# Patient Record
Sex: FEMALE | Race: WHITE | NOT HISPANIC OR LATINO | Employment: OTHER | URBAN - METROPOLITAN AREA
[De-identification: names, ages, dates, MRNs, and addresses within clinical notes are randomized per-mention and may not be internally consistent; named-entity substitution may affect disease eponyms.]

---

## 2017-01-01 ENCOUNTER — APPOINTMENT (OUTPATIENT)
Dept: ONCOLOGY | Facility: MEDICAL CENTER | Age: 77
End: 2017-01-01
Attending: SPECIALIST
Payer: MEDICARE

## 2017-01-01 ENCOUNTER — PATIENT OUTREACH (OUTPATIENT)
Dept: OTHER | Facility: MEDICAL CENTER | Age: 77
End: 2017-01-01

## 2017-01-01 ENCOUNTER — HOSPITAL ENCOUNTER (OUTPATIENT)
Dept: RADIOLOGY | Facility: MEDICAL CENTER | Age: 77
End: 2017-12-15
Attending: SPECIALIST
Payer: MEDICARE

## 2017-01-01 VITALS
WEIGHT: 150.79 LBS | SYSTOLIC BLOOD PRESSURE: 143 MMHG | RESPIRATION RATE: 18 BRPM | HEIGHT: 64 IN | BODY MASS INDEX: 25.74 KG/M2 | DIASTOLIC BLOOD PRESSURE: 54 MMHG | TEMPERATURE: 97.1 F | OXYGEN SATURATION: 100 % | HEART RATE: 71 BPM

## 2017-01-01 VITALS
HEART RATE: 78 BPM | OXYGEN SATURATION: 99 % | RESPIRATION RATE: 18 BRPM | SYSTOLIC BLOOD PRESSURE: 137 MMHG | WEIGHT: 150.13 LBS | HEIGHT: 64 IN | BODY MASS INDEX: 25.63 KG/M2 | DIASTOLIC BLOOD PRESSURE: 67 MMHG | TEMPERATURE: 97.8 F

## 2017-01-01 VITALS
SYSTOLIC BLOOD PRESSURE: 144 MMHG | RESPIRATION RATE: 18 BRPM | DIASTOLIC BLOOD PRESSURE: 57 MMHG | HEART RATE: 71 BPM | TEMPERATURE: 97.6 F | WEIGHT: 157.63 LBS | OXYGEN SATURATION: 97 % | HEIGHT: 64 IN | BODY MASS INDEX: 26.91 KG/M2

## 2017-01-01 VITALS
SYSTOLIC BLOOD PRESSURE: 165 MMHG | TEMPERATURE: 98.6 F | BODY MASS INDEX: 27.7 KG/M2 | OXYGEN SATURATION: 93 % | HEART RATE: 90 BPM | DIASTOLIC BLOOD PRESSURE: 63 MMHG | HEIGHT: 64 IN | RESPIRATION RATE: 18 BRPM | WEIGHT: 162.26 LBS

## 2017-01-01 VITALS
OXYGEN SATURATION: 99 % | HEART RATE: 83 BPM | WEIGHT: 153 LBS | RESPIRATION RATE: 18 BRPM | SYSTOLIC BLOOD PRESSURE: 139 MMHG | HEIGHT: 64 IN | DIASTOLIC BLOOD PRESSURE: 58 MMHG | BODY MASS INDEX: 26.12 KG/M2 | TEMPERATURE: 97.7 F

## 2017-01-01 VITALS
HEART RATE: 59 BPM | DIASTOLIC BLOOD PRESSURE: 63 MMHG | OXYGEN SATURATION: 99 % | HEIGHT: 64 IN | TEMPERATURE: 97.8 F | BODY MASS INDEX: 25.37 KG/M2 | RESPIRATION RATE: 18 BRPM | SYSTOLIC BLOOD PRESSURE: 113 MMHG | WEIGHT: 148.59 LBS

## 2017-01-01 VITALS
BODY MASS INDEX: 26.8 KG/M2 | HEIGHT: 64 IN | TEMPERATURE: 97.8 F | RESPIRATION RATE: 18 BRPM | HEART RATE: 65 BPM | OXYGEN SATURATION: 97 % | SYSTOLIC BLOOD PRESSURE: 160 MMHG | WEIGHT: 156.97 LBS | DIASTOLIC BLOOD PRESSURE: 59 MMHG

## 2017-01-01 VITALS
RESPIRATION RATE: 18 BRPM | OXYGEN SATURATION: 98 % | SYSTOLIC BLOOD PRESSURE: 167 MMHG | WEIGHT: 151.9 LBS | HEART RATE: 77 BPM | HEIGHT: 64 IN | DIASTOLIC BLOOD PRESSURE: 68 MMHG | BODY MASS INDEX: 25.93 KG/M2 | TEMPERATURE: 97.7 F

## 2017-01-01 DIAGNOSIS — C56.9 MALIGNANT NEOPLASM OF OVARY, UNSPECIFIED LATERALITY (HCC): ICD-10-CM

## 2017-01-01 DIAGNOSIS — E86.0 DEHYDRATION: ICD-10-CM

## 2017-01-01 DIAGNOSIS — T45.1X5A ANTINEOPLASTIC CHEMOTHERAPY INDUCED ANEMIA: ICD-10-CM

## 2017-01-01 DIAGNOSIS — D64.81 ANTINEOPLASTIC CHEMOTHERAPY INDUCED ANEMIA: ICD-10-CM

## 2017-01-01 DIAGNOSIS — N17.9 ACUTE KIDNEY INJURY (HCC): ICD-10-CM

## 2017-01-01 DIAGNOSIS — R53.0 NEOPLASTIC MALIGNANT RELATED FATIGUE: ICD-10-CM

## 2017-01-01 LAB
ALBUMIN SERPL BCP-MCNC: 2.8 G/DL (ref 3.2–4.9)
ALBUMIN SERPL BCP-MCNC: 3.4 G/DL (ref 3.2–4.9)
ALBUMIN SERPL BCP-MCNC: 3.5 G/DL (ref 3.2–4.9)
ALBUMIN/GLOB SERPL: 0.7 G/DL
ALBUMIN/GLOB SERPL: 1.2 G/DL
ALBUMIN/GLOB SERPL: 1.4 G/DL
ALP SERPL-CCNC: 77 U/L (ref 30–99)
ALP SERPL-CCNC: 80 U/L (ref 30–99)
ALP SERPL-CCNC: 83 U/L (ref 30–99)
ALT SERPL-CCNC: 15 U/L (ref 2–50)
ALT SERPL-CCNC: 20 U/L (ref 2–50)
ALT SERPL-CCNC: 21 U/L (ref 2–50)
AMORPH CRY #/AREA URNS HPF: PRESENT /HPF
ANION GAP SERPL CALC-SCNC: 7 MMOL/L (ref 0–11.9)
ANION GAP SERPL CALC-SCNC: 8 MMOL/L (ref 0–11.9)
ANION GAP SERPL CALC-SCNC: 8 MMOL/L (ref 0–11.9)
ANION GAP SERPL CALC-SCNC: 9 MMOL/L (ref 0–11.9)
ANISOCYTOSIS BLD QL SMEAR: ABNORMAL
APPEARANCE UR: ABNORMAL
AST SERPL-CCNC: 33 U/L (ref 12–45)
AST SERPL-CCNC: 36 U/L (ref 12–45)
AST SERPL-CCNC: 39 U/L (ref 12–45)
BACTERIA #/AREA URNS HPF: ABNORMAL /HPF
BACTERIA #/AREA URNS HPF: ABNORMAL /HPF
BACTERIA #/AREA URNS HPF: NEGATIVE /HPF
BACTERIA UR CULT: ABNORMAL
BACTERIA UR CULT: ABNORMAL
BASOPHILS # BLD AUTO: 0.6 % (ref 0–1.8)
BASOPHILS # BLD AUTO: 0.8 % (ref 0–1.8)
BASOPHILS # BLD AUTO: 0.9 % (ref 0–1.8)
BASOPHILS # BLD AUTO: 1.7 % (ref 0–1.8)
BASOPHILS # BLD AUTO: 2.8 % (ref 0–1.8)
BASOPHILS # BLD: 0.02 K/UL (ref 0–0.12)
BASOPHILS # BLD: 0.03 K/UL (ref 0–0.12)
BASOPHILS # BLD: 0.03 K/UL (ref 0–0.12)
BASOPHILS # BLD: 0.04 K/UL (ref 0–0.12)
BASOPHILS # BLD: 0.11 K/UL (ref 0–0.12)
BILIRUB SERPL-MCNC: 0.5 MG/DL (ref 0.1–1.5)
BILIRUB UR QL STRIP.AUTO: NEGATIVE
BUN SERPL-MCNC: 22 MG/DL (ref 8–22)
BUN SERPL-MCNC: 23 MG/DL (ref 8–22)
BUN SERPL-MCNC: 26 MG/DL (ref 8–22)
BUN SERPL-MCNC: 28 MG/DL (ref 8–22)
CALCIUM SERPL-MCNC: 8.2 MG/DL (ref 8.5–10.5)
CALCIUM SERPL-MCNC: 8.5 MG/DL (ref 8.5–10.5)
CALCIUM SERPL-MCNC: 8.5 MG/DL (ref 8.5–10.5)
CALCIUM SERPL-MCNC: 8.7 MG/DL (ref 8.5–10.5)
CANCER AG125 SERPL-ACNC: 28.1 U/ML (ref 0–35)
CANCER AG125 SERPL-ACNC: 45.4 U/ML (ref 0–35)
CHLORIDE SERPL-SCNC: 103 MMOL/L (ref 96–112)
CHLORIDE SERPL-SCNC: 103 MMOL/L (ref 96–112)
CHLORIDE SERPL-SCNC: 105 MMOL/L (ref 96–112)
CHLORIDE SERPL-SCNC: 111 MMOL/L (ref 96–112)
CO2 SERPL-SCNC: 19 MMOL/L (ref 20–33)
CO2 SERPL-SCNC: 21 MMOL/L (ref 20–33)
CO2 SERPL-SCNC: 21 MMOL/L (ref 20–33)
CO2 SERPL-SCNC: 22 MMOL/L (ref 20–33)
COLOR UR: ABNORMAL
COMMENT 1642: NORMAL
COMMENT 1642: NORMAL
CREAT SERPL-MCNC: 1.14 MG/DL (ref 0.5–1.4)
CREAT SERPL-MCNC: 1.2 MG/DL (ref 0.5–1.4)
CREAT SERPL-MCNC: 1.23 MG/DL (ref 0.5–1.4)
CREAT SERPL-MCNC: 1.62 MG/DL (ref 0.5–1.4)
CREAT SERPL-MCNC: 1.64 MG/DL (ref 0.5–1.4)
CREAT UR-MCNC: 129.9 MG/DL
CREAT UR-MCNC: 142.6 MG/DL
CREAT UR-MCNC: 148.5 MG/DL
CULTURE IF INDICATED INDCX: YES UA CULTURE
EOSINOPHIL # BLD AUTO: 0.02 K/UL (ref 0–0.51)
EOSINOPHIL # BLD AUTO: 0.03 K/UL (ref 0–0.51)
EOSINOPHIL # BLD AUTO: 0.04 K/UL (ref 0–0.51)
EOSINOPHIL # BLD AUTO: 0.09 K/UL (ref 0–0.51)
EOSINOPHIL # BLD AUTO: 0.11 K/UL (ref 0–0.51)
EOSINOPHIL NFR BLD: 0.9 % (ref 0–6.9)
EOSINOPHIL NFR BLD: 0.9 % (ref 0–6.9)
EOSINOPHIL NFR BLD: 1.8 % (ref 0–6.9)
EOSINOPHIL NFR BLD: 1.9 % (ref 0–6.9)
EOSINOPHIL NFR BLD: 2.2 % (ref 0–6.9)
EPI CELLS #/AREA URNS HPF: ABNORMAL /HPF
EPI CELLS #/AREA URNS HPF: NEGATIVE /HPF
ERYTHROCYTE [DISTWIDTH] IN BLOOD BY AUTOMATED COUNT: 65.9 FL (ref 35.9–50)
ERYTHROCYTE [DISTWIDTH] IN BLOOD BY AUTOMATED COUNT: 76.6 FL (ref 35.9–50)
ERYTHROCYTE [DISTWIDTH] IN BLOOD BY AUTOMATED COUNT: 83.6 FL (ref 35.9–50)
ERYTHROCYTE [DISTWIDTH] IN BLOOD BY AUTOMATED COUNT: 85.4 FL (ref 35.9–50)
ERYTHROCYTE [DISTWIDTH] IN BLOOD BY AUTOMATED COUNT: 90.5 FL (ref 35.9–50)
GFR SERPL CREATININE-BSD FRML MDRD: 31 ML/MIN/1.73 M 2
GFR SERPL CREATININE-BSD FRML MDRD: 31 ML/MIN/1.73 M 2
GFR SERPL CREATININE-BSD FRML MDRD: 43 ML/MIN/1.73 M 2
GFR SERPL CREATININE-BSD FRML MDRD: 44 ML/MIN/1.73 M 2
GFR SERPL CREATININE-BSD FRML MDRD: 46 ML/MIN/1.73 M 2
GLOBULIN SER CALC-MCNC: 2.5 G/DL (ref 1.9–3.5)
GLOBULIN SER CALC-MCNC: 2.9 G/DL (ref 1.9–3.5)
GLOBULIN SER CALC-MCNC: 3.9 G/DL (ref 1.9–3.5)
GLUCOSE SERPL-MCNC: 113 MG/DL (ref 65–99)
GLUCOSE SERPL-MCNC: 122 MG/DL (ref 65–99)
GLUCOSE SERPL-MCNC: 82 MG/DL (ref 65–99)
GLUCOSE SERPL-MCNC: 96 MG/DL (ref 65–99)
GLUCOSE UR STRIP.AUTO-MCNC: NEGATIVE MG/DL
HCT VFR BLD AUTO: 26.9 % (ref 37–47)
HCT VFR BLD AUTO: 27.3 % (ref 37–47)
HCT VFR BLD AUTO: 27.8 % (ref 37–47)
HCT VFR BLD AUTO: 28.8 % (ref 37–47)
HCT VFR BLD AUTO: 29.1 % (ref 37–47)
HGB BLD-MCNC: 8.5 G/DL (ref 12–16)
HGB BLD-MCNC: 8.7 G/DL (ref 12–16)
HGB BLD-MCNC: 8.8 G/DL (ref 12–16)
HGB BLD-MCNC: 9.1 G/DL (ref 12–16)
HGB BLD-MCNC: 9.2 G/DL (ref 12–16)
HYALINE CASTS #/AREA URNS LPF: ABNORMAL /LPF
HYALINE CASTS #/AREA URNS LPF: ABNORMAL /LPF
IMM GRANULOCYTES # BLD AUTO: 0.01 K/UL (ref 0–0.11)
IMM GRANULOCYTES # BLD AUTO: 0.02 K/UL (ref 0–0.11)
IMM GRANULOCYTES # BLD AUTO: 0.04 K/UL (ref 0–0.11)
IMM GRANULOCYTES NFR BLD AUTO: 0.2 % (ref 0–0.9)
IMM GRANULOCYTES NFR BLD AUTO: 0.4 % (ref 0–0.9)
IMM GRANULOCYTES NFR BLD AUTO: 1.8 % (ref 0–0.9)
KETONES UR STRIP.AUTO-MCNC: NEGATIVE MG/DL
LEUKOCYTE ESTERASE UR QL STRIP.AUTO: ABNORMAL
LYMPHOCYTES # BLD AUTO: 0.58 K/UL (ref 1–4.8)
LYMPHOCYTES # BLD AUTO: 0.78 K/UL (ref 1–4.8)
LYMPHOCYTES # BLD AUTO: 0.9 K/UL (ref 1–4.8)
LYMPHOCYTES # BLD AUTO: 1.02 K/UL (ref 1–4.8)
LYMPHOCYTES # BLD AUTO: 1.43 K/UL (ref 1–4.8)
LYMPHOCYTES NFR BLD: 15.4 % (ref 22–41)
LYMPHOCYTES NFR BLD: 25.7 % (ref 22–41)
LYMPHOCYTES NFR BLD: 26.9 % (ref 22–41)
LYMPHOCYTES NFR BLD: 30.9 % (ref 22–41)
LYMPHOCYTES NFR BLD: 45.2 % (ref 22–41)
MACROCYTES BLD QL SMEAR: ABNORMAL
MAGNESIUM SERPL-MCNC: 2.4 MG/DL (ref 1.5–2.5)
MAGNESIUM SERPL-MCNC: 2.5 MG/DL (ref 1.5–2.5)
MAGNESIUM SERPL-MCNC: 2.5 MG/DL (ref 1.5–2.5)
MANUAL DIFF BLD: NORMAL
MANUAL DIFF BLD: NORMAL
MCH RBC QN AUTO: 27.1 PG (ref 27–33)
MCH RBC QN AUTO: 28.7 PG (ref 27–33)
MCH RBC QN AUTO: 29.1 PG (ref 27–33)
MCH RBC QN AUTO: 29.2 PG (ref 27–33)
MCH RBC QN AUTO: 30.5 PG (ref 27–33)
MCHC RBC AUTO-ENTMCNC: 31.3 G/DL (ref 33.6–35)
MCHC RBC AUTO-ENTMCNC: 31.6 G/DL (ref 33.6–35)
MCHC RBC AUTO-ENTMCNC: 31.7 G/DL (ref 33.6–35)
MCHC RBC AUTO-ENTMCNC: 31.9 G/DL (ref 33.6–35)
MCHC RBC AUTO-ENTMCNC: 31.9 G/DL (ref 33.6–35)
MCV RBC AUTO: 85.5 FL (ref 81.4–97.8)
MCV RBC AUTO: 89.7 FL (ref 81.4–97.8)
MCV RBC AUTO: 91.6 FL (ref 81.4–97.8)
MCV RBC AUTO: 93 FL (ref 81.4–97.8)
MCV RBC AUTO: 96.4 FL (ref 81.4–97.8)
MICRO URNS: ABNORMAL
MICROCYTES BLD QL SMEAR: ABNORMAL
MONOCYTES # BLD AUTO: 0.12 K/UL (ref 0–0.85)
MONOCYTES # BLD AUTO: 0.24 K/UL (ref 0–0.85)
MONOCYTES # BLD AUTO: 0.42 K/UL (ref 0–0.85)
MONOCYTES # BLD AUTO: 1.46 K/UL (ref 0–0.85)
MONOCYTES # BLD AUTO: 1.58 K/UL (ref 0–0.85)
MONOCYTES NFR BLD AUTO: 10.6 % (ref 0–13.4)
MONOCYTES NFR BLD AUTO: 11.1 % (ref 0–13.4)
MONOCYTES NFR BLD AUTO: 28.7 % (ref 0–13.4)
MONOCYTES NFR BLD AUTO: 34.1 % (ref 0–13.4)
MONOCYTES NFR BLD AUTO: 6.1 % (ref 0–13.4)
MORPHOLOGY BLD-IMP: NORMAL
MUCOUS THREADS #/AREA URNS HPF: ABNORMAL /HPF
NEUTROPHILS # BLD AUTO: 0.9 K/UL (ref 2–7.15)
NEUTROPHILS # BLD AUTO: 1.36 K/UL (ref 2–7.15)
NEUTROPHILS # BLD AUTO: 1.48 K/UL (ref 2–7.15)
NEUTROPHILS # BLD AUTO: 2.22 K/UL (ref 2–7.15)
NEUTROPHILS # BLD AUTO: 2.68 K/UL (ref 2–7.15)
NEUTROPHILS NFR BLD: 32.1 % (ref 44–72)
NEUTROPHILS NFR BLD: 45.2 % (ref 44–72)
NEUTROPHILS NFR BLD: 52.7 % (ref 44–72)
NEUTROPHILS NFR BLD: 58.3 % (ref 44–72)
NEUTROPHILS NFR BLD: 60.1 % (ref 44–72)
NITRITE UR QL STRIP.AUTO: NEGATIVE
NRBC # BLD AUTO: 0 K/UL
NRBC # BLD AUTO: 0.02 K/UL
NRBC # BLD AUTO: 0.04 K/UL
NRBC BLD AUTO-RTO: 0 /100 WBC
NRBC BLD AUTO-RTO: 0.9 /100 WBC
NRBC BLD AUTO-RTO: 0.9 /100 WBC
OVALOCYTES BLD QL SMEAR: NORMAL
PH UR STRIP.AUTO: 5.5 [PH]
PH UR STRIP.AUTO: 6 [PH]
PH UR STRIP.AUTO: 6 [PH]
PLATELET # BLD AUTO: 182 K/UL (ref 164–446)
PLATELET # BLD AUTO: 186 K/UL (ref 164–446)
PLATELET # BLD AUTO: 254 K/UL (ref 164–446)
PLATELET # BLD AUTO: 263 K/UL (ref 164–446)
PLATELET # BLD AUTO: 352 K/UL (ref 164–446)
PLATELET BLD QL SMEAR: NORMAL
PMV BLD AUTO: 10 FL (ref 9–12.9)
PMV BLD AUTO: 10.5 FL (ref 9–12.9)
PMV BLD AUTO: 9 FL (ref 9–12.9)
PMV BLD AUTO: 9.5 FL (ref 9–12.9)
PMV BLD AUTO: 9.6 FL (ref 9–12.9)
POIKILOCYTOSIS BLD QL SMEAR: NORMAL
POLYCHROMASIA BLD QL SMEAR: NORMAL
POTASSIUM SERPL-SCNC: 4.3 MMOL/L (ref 3.6–5.5)
POTASSIUM SERPL-SCNC: 4.4 MMOL/L (ref 3.6–5.5)
POTASSIUM SERPL-SCNC: 4.6 MMOL/L (ref 3.6–5.5)
POTASSIUM SERPL-SCNC: 4.8 MMOL/L (ref 3.6–5.5)
PROT SERPL-MCNC: 6 G/DL (ref 6–8.2)
PROT SERPL-MCNC: 6.3 G/DL (ref 6–8.2)
PROT SERPL-MCNC: 6.7 G/DL (ref 6–8.2)
PROT UR QL STRIP: 300 MG/DL
PROT UR-MCNC: 327.3 MG/DL (ref 0–15)
PROT UR-MCNC: 362.4 MG/DL (ref 0–15)
PROT UR-MCNC: 671.3 MG/DL (ref 0–15)
PROT/CREAT UR: 2295 MG/G (ref 10–107)
PROT/CREAT UR: 2440 MG/G (ref 10–107)
PROT/CREAT UR: 5168 MG/G (ref 10–107)
RBC # BLD AUTO: 2.79 M/UL (ref 4.2–5.4)
RBC # BLD AUTO: 2.98 M/UL (ref 4.2–5.4)
RBC # BLD AUTO: 3.13 M/UL (ref 4.2–5.4)
RBC # BLD AUTO: 3.21 M/UL (ref 4.2–5.4)
RBC # BLD AUTO: 3.25 M/UL (ref 4.2–5.4)
RBC # URNS HPF: >150 /HPF
RBC # URNS HPF: ABNORMAL /HPF
RBC # URNS HPF: ABNORMAL /HPF
RBC BLD AUTO: PRESENT
RBC UR QL AUTO: ABNORMAL
SIGNIFICANT IND 70042: ABNORMAL
SITE SITE: ABNORMAL
SMUDGE CELLS BLD QL SMEAR: NORMAL
SODIUM SERPL-SCNC: 132 MMOL/L (ref 135–145)
SODIUM SERPL-SCNC: 133 MMOL/L (ref 135–145)
SODIUM SERPL-SCNC: 134 MMOL/L (ref 135–145)
SODIUM SERPL-SCNC: 138 MMOL/L (ref 135–145)
SOURCE SOURCE: ABNORMAL
SP GR UR STRIP.AUTO: 1.01
SP GR UR STRIP.AUTO: 1.01
SP GR UR STRIP.AUTO: 1.02
UROBILINOGEN UR STRIP.AUTO-MCNC: 0.2 MG/DL
WBC # BLD AUTO: 2 K/UL (ref 4.8–10.8)
WBC # BLD AUTO: 2.3 K/UL (ref 4.8–10.8)
WBC # BLD AUTO: 3.8 K/UL (ref 4.8–10.8)
WBC # BLD AUTO: 4.6 K/UL (ref 4.8–10.8)
WBC # BLD AUTO: 5.1 K/UL (ref 4.8–10.8)
WBC #/AREA URNS HPF: ABNORMAL /HPF
YEAST HYPHAE #/AREA URNS HPF: PRESENT /HPF

## 2017-01-01 PROCEDURE — 83735 ASSAY OF MAGNESIUM: CPT

## 2017-01-01 PROCEDURE — 700111 HCHG RX REV CODE 636 W/ 250 OVERRIDE (IP)

## 2017-01-01 PROCEDURE — 80048 BASIC METABOLIC PNL TOTAL CA: CPT

## 2017-01-01 PROCEDURE — 96413 CHEMO IV INFUSION 1 HR: CPT

## 2017-01-01 PROCEDURE — 96360 HYDRATION IV INFUSION INIT: CPT

## 2017-01-01 PROCEDURE — 96417 CHEMO IV INFUS EACH ADDL SEQ: CPT

## 2017-01-01 PROCEDURE — 700105 HCHG RX REV CODE 258

## 2017-01-01 PROCEDURE — 700105 HCHG RX REV CODE 258: Performed by: SPECIALIST

## 2017-01-01 PROCEDURE — 86304 IMMUNOASSAY TUMOR CA 125: CPT

## 2017-01-01 PROCEDURE — 700117 HCHG RX CONTRAST REV CODE 255: Performed by: SPECIALIST

## 2017-01-01 PROCEDURE — 85025 COMPLETE CBC W/AUTO DIFF WBC: CPT

## 2017-01-01 PROCEDURE — 96361 HYDRATE IV INFUSION ADD-ON: CPT

## 2017-01-01 PROCEDURE — A4212 NON CORING NEEDLE OR STYLET: HCPCS

## 2017-01-01 PROCEDURE — 85027 COMPLETE CBC AUTOMATED: CPT

## 2017-01-01 PROCEDURE — 36591 DRAW BLOOD OFF VENOUS DEVICE: CPT

## 2017-01-01 PROCEDURE — 80053 COMPREHEN METABOLIC PANEL: CPT

## 2017-01-01 PROCEDURE — 84156 ASSAY OF PROTEIN URINE: CPT

## 2017-01-01 PROCEDURE — 700111 HCHG RX REV CODE 636 W/ 250 OVERRIDE (IP): Performed by: SPECIALIST

## 2017-01-01 PROCEDURE — 306780 HCHG STAT FOR TRANSFUSION PER CASE

## 2017-01-01 PROCEDURE — 82570 ASSAY OF URINE CREATININE: CPT

## 2017-01-01 PROCEDURE — 81001 URINALYSIS AUTO W/SCOPE: CPT

## 2017-01-01 PROCEDURE — 85007 BL SMEAR W/DIFF WBC COUNT: CPT

## 2017-01-01 PROCEDURE — 82565 ASSAY OF CREATININE: CPT

## 2017-01-01 PROCEDURE — 87086 URINE CULTURE/COLONY COUNT: CPT

## 2017-01-01 PROCEDURE — 700111 HCHG RX REV CODE 636 W/ 250 OVERRIDE (IP): Mod: JW

## 2017-01-01 PROCEDURE — 700105 HCHG RX REV CODE 258: Performed by: OBSTETRICS & GYNECOLOGY

## 2017-01-01 PROCEDURE — 96523 IRRIG DRUG DELIVERY DEVICE: CPT

## 2017-01-01 PROCEDURE — 96374 THER/PROPH/DIAG INJ IV PUSH: CPT | Mod: XU

## 2017-01-01 PROCEDURE — 96375 TX/PRO/DX INJ NEW DRUG ADDON: CPT

## 2017-01-01 PROCEDURE — 74177 CT ABD & PELVIS W/CONTRAST: CPT

## 2017-01-01 RX ORDER — SODIUM CHLORIDE 9 MG/ML
INJECTION, SOLUTION INTRAVENOUS CONTINUOUS
Status: DISCONTINUED | OUTPATIENT
Start: 2017-01-01 | End: 2017-01-01 | Stop reason: HOSPADM

## 2017-01-01 RX ORDER — PROCHLORPERAZINE MALEATE 10 MG
10 TABLET ORAL EVERY 6 HOURS PRN
Status: CANCELLED | OUTPATIENT
Start: 2017-01-01

## 2017-01-01 RX ORDER — LIDOCAINE HYDROCHLORIDE 10 MG/ML
0.5 INJECTION, SOLUTION INFILTRATION; PERINEURAL SEE ADMIN INSTRUCTIONS
Status: CANCELLED | OUTPATIENT
Start: 2017-01-01

## 2017-01-01 RX ORDER — SODIUM CHLORIDE 9 MG/ML
2000 INJECTION, SOLUTION INTRAVENOUS ONCE
Status: COMPLETED | OUTPATIENT
Start: 2017-01-01 | End: 2017-01-01

## 2017-01-01 RX ORDER — SODIUM CHLORIDE 9 MG/ML
1000 INJECTION, SOLUTION INTRAVENOUS ONCE
Status: CANCELLED
Start: 2017-01-01 | End: 2017-01-01

## 2017-01-01 RX ORDER — ONDANSETRON 2 MG/ML
4 INJECTION INTRAMUSCULAR; INTRAVENOUS
Status: CANCELLED | OUTPATIENT
Start: 2017-01-01

## 2017-01-01 RX ORDER — SODIUM CHLORIDE 9 MG/ML
INJECTION, SOLUTION INTRAVENOUS CONTINUOUS
Status: CANCELLED | OUTPATIENT
Start: 2017-01-01

## 2017-01-01 RX ORDER — ONDANSETRON 8 MG/1
8 TABLET, ORALLY DISINTEGRATING ORAL
Status: CANCELLED | OUTPATIENT
Start: 2017-01-01

## 2017-01-01 RX ADMIN — CARBOPLATIN 245 MG: 10 INJECTION, SOLUTION INTRAVENOUS at 13:24

## 2017-01-01 RX ADMIN — SODIUM CHLORIDE 1000 MG: 9 INJECTION, SOLUTION INTRAVENOUS at 17:02

## 2017-01-01 RX ADMIN — GEMCITABINE 1710 MG: 1 INJECTION, POWDER, LYOPHILIZED, FOR SOLUTION INTRAVENOUS at 11:27

## 2017-01-01 RX ADMIN — GEMCITABINE 1710 MG: 1 INJECTION, POWDER, LYOPHILIZED, FOR SOLUTION INTRAVENOUS at 15:34

## 2017-01-01 RX ADMIN — GEMCITABINE 1710 MG: 1 INJECTION, POWDER, LYOPHILIZED, FOR SOLUTION INTRAVENOUS at 12:44

## 2017-01-01 RX ADMIN — SODIUM CHLORIDE: 9 INJECTION, SOLUTION INTRAVENOUS at 11:58

## 2017-01-01 RX ADMIN — HEPARIN 500 UNITS: 100 SYRINGE at 15:15

## 2017-01-01 RX ADMIN — HEPARIN 500 UNITS: 100 SYRINGE at 14:50

## 2017-01-01 RX ADMIN — IOHEXOL 60 ML: 350 INJECTION, SOLUTION INTRAVENOUS at 14:38

## 2017-01-01 RX ADMIN — HEPARIN: 100 SYRINGE at 14:45

## 2017-01-01 RX ADMIN — ONDANSETRON HYDROCHLORIDE 16 MG: 2 SOLUTION INTRAMUSCULAR; INTRAVENOUS at 14:29

## 2017-01-01 RX ADMIN — CARBOPLATIN 245 MG: 10 INJECTION, SOLUTION INTRAVENOUS at 16:18

## 2017-01-01 RX ADMIN — HEPARIN 500 UNITS: 100 SYRINGE at 17:37

## 2017-01-01 RX ADMIN — ONDANSETRON HYDROCHLORIDE 16 MG: 2 INJECTION, SOLUTION INTRAMUSCULAR; INTRAVENOUS at 11:58

## 2017-01-01 RX ADMIN — SODIUM CHLORIDE: 9 INJECTION, SOLUTION INTRAVENOUS at 13:50

## 2017-01-01 RX ADMIN — GEMCITABINE 1710 MG: 1 INJECTION, POWDER, LYOPHILIZED, FOR SOLUTION INTRAVENOUS at 14:00

## 2017-01-01 RX ADMIN — SODIUM CHLORIDE 2000 ML: 9 INJECTION, SOLUTION INTRAVENOUS at 10:15

## 2017-01-01 RX ADMIN — SODIUM CHLORIDE 2000 ML: 9 INJECTION, SOLUTION INTRAVENOUS at 13:39

## 2017-01-01 RX ADMIN — HEPARIN 500 UNITS: 100 SYRINGE at 14:15

## 2017-01-01 RX ADMIN — HEPARIN 500 UNITS: 100 SYRINGE at 12:23

## 2017-01-01 ASSESSMENT — PAIN SCALES - GENERAL
PAINLEVEL: NO PAIN
PAINLEVEL: 2=MINIMAL-SLIGHT
PAINLEVEL: NO PAIN
PAINLEVEL: 3=SLIGHT PAIN
PAINLEVEL: NO PAIN

## 2017-01-03 ENCOUNTER — AMB ONCOLOGY NURSE NAVIGATOR ENCOUNTER (OUTPATIENT)
Dept: OTHER | Facility: MEDICAL CENTER | Age: 77
End: 2017-01-03

## 2017-01-03 ENCOUNTER — OUTPATIENT INFUSION SERVICES (OUTPATIENT)
Dept: ONCOLOGY | Facility: MEDICAL CENTER | Age: 77
End: 2017-01-03
Attending: SPECIALIST
Payer: MEDICARE

## 2017-01-03 VITALS
BODY MASS INDEX: 26.8 KG/M2 | WEIGHT: 151.24 LBS | DIASTOLIC BLOOD PRESSURE: 51 MMHG | HEART RATE: 70 BPM | OXYGEN SATURATION: 97 % | RESPIRATION RATE: 18 BRPM | TEMPERATURE: 98 F | HEIGHT: 63 IN | SYSTOLIC BLOOD PRESSURE: 120 MMHG

## 2017-01-03 DIAGNOSIS — Z51.11 ENCOUNTER FOR ANTINEOPLASTIC CHEMOTHERAPY: ICD-10-CM

## 2017-01-03 DIAGNOSIS — Z85.43 HISTORY OF OVARIAN CANCER: ICD-10-CM

## 2017-01-03 PROCEDURE — A4212 NON CORING NEEDLE OR STYLET: HCPCS

## 2017-01-03 PROCEDURE — 700105 HCHG RX REV CODE 258: Performed by: NURSE PRACTITIONER

## 2017-01-03 PROCEDURE — 304540 HCHG NITRO SET VENT 2ND TUB

## 2017-01-03 PROCEDURE — 700111 HCHG RX REV CODE 636 W/ 250 OVERRIDE (IP): Performed by: NURSE PRACTITIONER

## 2017-01-03 PROCEDURE — 96415 CHEMO IV INFUSION ADDL HR: CPT

## 2017-01-03 PROCEDURE — 96361 HYDRATE IV INFUSION ADD-ON: CPT

## 2017-01-03 PROCEDURE — 96375 TX/PRO/DX INJ NEW DRUG ADDON: CPT

## 2017-01-03 PROCEDURE — 96413 CHEMO IV INFUSION 1 HR: CPT

## 2017-01-03 RX ORDER — SODIUM CHLORIDE 9 MG/ML
1000 INJECTION, SOLUTION INTRAVENOUS ONCE
Status: COMPLETED | OUTPATIENT
Start: 2017-01-03 | End: 2017-01-03

## 2017-01-03 RX ORDER — FLUCONAZOLE 200 MG/1
200 TABLET ORAL DAILY
COMMUNITY
End: 2017-04-19

## 2017-01-03 RX ORDER — SODIUM CHLORIDE 9 MG/ML
INJECTION, SOLUTION INTRAVENOUS CONTINUOUS
Status: DISCONTINUED | OUTPATIENT
Start: 2017-01-03 | End: 2017-01-03 | Stop reason: HOSPADM

## 2017-01-03 RX ORDER — SULFAMETHOXAZOLE AND TRIMETHOPRIM 200; 40 MG/5ML; MG/5ML
8 SUSPENSION ORAL 2 TIMES DAILY
COMMUNITY
End: 2017-04-19

## 2017-01-03 RX ADMIN — DIPHENHYDRAMINE HYDROCHLORIDE 50 MG: 50 INJECTION INTRAMUSCULAR; INTRAVENOUS at 12:23

## 2017-01-03 RX ADMIN — PACLITAXEL 302.8 MG: 6 INJECTION, SOLUTION, CONCENTRATE INTRAVENOUS at 12:46

## 2017-01-03 RX ADMIN — HEPARIN 500 UNITS: 100 SYRINGE at 16:10

## 2017-01-03 RX ADMIN — SODIUM CHLORIDE 1000 ML: 9 INJECTION, SOLUTION INTRAVENOUS at 11:12

## 2017-01-03 RX ADMIN — FAMOTIDINE 20 MG: 10 INJECTION INTRAVENOUS at 11:45

## 2017-01-03 RX ADMIN — DEXAMETHASONE SODIUM PHOSPHATE 12 MG: 4 INJECTION, SOLUTION INTRAMUSCULAR; INTRAVENOUS at 12:07

## 2017-01-03 RX ADMIN — SODIUM CHLORIDE: 9 INJECTION, SOLUTION INTRAVENOUS at 11:13

## 2017-01-03 RX ADMIN — ONDANSETRON HYDROCHLORIDE 16 MG: 2 SOLUTION INTRAMUSCULAR; INTRAVENOUS at 11:49

## 2017-01-03 NOTE — PROGRESS NOTES
"Pharmacy Chemotherapy Verification    Patient Name: Rosanna Damico      Dx: Recurrent Ovarian Cancer    Cycle:  2   Previous treatment:C1 12/12/16  Protocol: Paclitaxel   Paclitaxel 175 mg/m2 IV over 3 hrs on day 1  Q21 days until disease progression or unacceptable toxicity  NCCN Guidelines for Ovarian Cancer. V.1.2016  Dunia LOPEZ, et al. Lancet 2003;361(1841):3951-304.          Allergies: Cisplatin and Codeine  /51 mmHg  Pulse 70  Temp(Src) 36.7 °C (98 °F)  Resp 18  Ht 1.6 m (5' 2.99\")  Wt 68.6 kg (151 lb 3.8 oz)  BMI 26.80 kg/m2  SpO2 97% Body surface area is 1.75 meters squared.     Labs 1/2/17   ANC~ 3000    Plt = 239 k Hgb = 11.3 SCr = 1.9 mg/dL CrCl = 28.1 mL/min (no dose adjustment required per manufacture) AST/ALT/AP/Tbili = 25/27/85/0.3     Paclitaxel (Taxol) 175 mg/m² x 1.75 m² = 306.25 mg   <5% difference, OK to treat with final dose = 302.8 mg IV over 3 hrs     Savana Tejada, PharmD             "

## 2017-01-03 NOTE — PROGRESS NOTES
"Pharmacy Chemotherapy Calculation    Patient Name: Rosanna Damico   Protocol: Taxol     *Dosing Reference*  Paclitaxel 175-185 mg/m2 IV day 1 Bhavin NICOLE dosing at 175 mg/m2   21 day cycle until DP or UT  NCCN guidelines for ovarian cancer V.1.2016  Dunia NICOLE, et al. Lancet. 2003;361(3731);2699-229    Dx: Ovarian CA, heavily pretreated     Allergies:  Cisplatin and Codeine       /51 mmHg  Pulse 70  Temp(Src) 36.7 °C (98 °F)  Resp 18  Ht 1.6 m (5' 2.99\")  Wt 68.6 kg (151 lb 3.8 oz)  BMI 26.80 kg/m2  SpO2 97% Body surface area is 1.75 meters squared.  MD using baseline weight = 68 kg and baseline [place] BSA = 1.73 m2     Labs 1/2/17  ANC~ 3000  Plt = 239 k Hgb = 11.3   SCr = 1.9 mg/dL CrCl ~ 28 ml/min (no renal adjustment recommended for paclitaxel)  LFTs = WNL  T bili = 0.3     Drug Order   (Drug name, dose, route, IV Fluid & volume, frequency, number of doses) Cycle: 1      Previous treatment: Cycle 1 = 12/12/16    Medication = PACLItaxel  Base Dose= 175 mg/m2   Calc Dose: Base Dose x 1.75 m2 = 306.25 mg  Final Dose = 302.8 mg  Route = IV  Fluid & Volume =  mL  Admin Duration = Over 3 hours          <5% difference, OK to treat with final dose     By my signature below, I confirm this process was performed independently with the BSA and all final chemotherapy dosing calculations congruent. I have reviewed the above chemotherapy order and that my calculation of the final dose and BSA (when applicable) corroborate those calculations of the  pharmacist. Discrepancies of 5% or greater in the written dose have been addressed and documented within the [facility] Progress notes.    Alma AndrewD          "

## 2017-01-03 NOTE — PROGRESS NOTES
Chemotherapy Verification - PRIMARY RN      Height = 160 cm  Weight = 68.6 kg  BSA = 1.75 m2       Medication: Taxol  Dose: 175 mg/m2  Calculated Dose: 306.25 mg                             (In mg/m2, AUC, mg/kg)       I confirm this process was performed independently with the BSA and all final chemotherapy dosing calculations congruent.  Any discrepancies of 5% or greater have been addressed with the chemotherapy pharmacist. The resolution of the discrepancy has been documented in the EPIC progress notes.

## 2017-01-03 NOTE — PROGRESS NOTES
Chemotherapy Verification - SECONDARY RN       Height = 160 cm  Weight = 68.6 kg  BSA = 1.746 m2       Medication: Taxol  Dose: 175 mg/m2  Calculated Dose: 305.5 mg                             (In mg/m2, AUC, mg/kg)       I confirm that this process was performed independently.

## 2017-01-03 NOTE — PROGRESS NOTES
[facility]  Rosanna is here for Taxol infusion.  States she is doing well but Taxol has more side effects than her last treatment. She has lost her hair and is wearing a wig and is feeling upset about the hair loss.  Emotional support and active listening provided.  Discussion about hydration and nutrition.  Encouraged to increase fluid intake and to eat small amounts more frequently.  States at some point she would like to increase plant based foods in her diet, but is concerned about getting enough protein.  Information provided about [employer] program.  Also interested in taking an exercise class for people with cancer at [facility].  Facilitated a rx for her to take the class from Dr Delcid's office.  Denies other needs today.  Encouraged to call with questions or needs.

## 2017-01-04 NOTE — PROGRESS NOTES
Pt here for Taxol,  Reviewed labs with RUIZ Thomas.  Pt to receive additional hydration with chemo.  Premeds and chemo infused without issue.  Hydration infused throughout appt.  Port flushed per policy and de-accessed.  Pt left IC, no s/s of distress.  Next apt confirmed.

## 2017-01-24 ENCOUNTER — OUTPATIENT INFUSION SERVICES (OUTPATIENT)
Dept: ONCOLOGY | Facility: MEDICAL CENTER | Age: 77
End: 2017-01-24
Attending: SPECIALIST
Payer: MEDICARE

## 2017-01-24 VITALS
DIASTOLIC BLOOD PRESSURE: 63 MMHG | HEIGHT: 63 IN | TEMPERATURE: 97.5 F | HEART RATE: 80 BPM | RESPIRATION RATE: 18 BRPM | WEIGHT: 150.35 LBS | OXYGEN SATURATION: 98 % | SYSTOLIC BLOOD PRESSURE: 135 MMHG | BODY MASS INDEX: 26.64 KG/M2

## 2017-01-24 DIAGNOSIS — Z51.11 ENCOUNTER FOR ANTINEOPLASTIC CHEMOTHERAPY: ICD-10-CM

## 2017-01-24 DIAGNOSIS — Z85.43 HISTORY OF OVARIAN CANCER: ICD-10-CM

## 2017-01-24 PROCEDURE — 96375 TX/PRO/DX INJ NEW DRUG ADDON: CPT

## 2017-01-24 PROCEDURE — 304540 HCHG NITRO SET VENT 2ND TUB

## 2017-01-24 PROCEDURE — 700111 HCHG RX REV CODE 636 W/ 250 OVERRIDE (IP): Performed by: SPECIALIST

## 2017-01-24 PROCEDURE — 700111 HCHG RX REV CODE 636 W/ 250 OVERRIDE (IP)

## 2017-01-24 PROCEDURE — 700111 HCHG RX REV CODE 636 W/ 250 OVERRIDE (IP): Performed by: NURSE PRACTITIONER

## 2017-01-24 PROCEDURE — 96368 THER/DIAG CONCURRENT INF: CPT

## 2017-01-24 PROCEDURE — 96415 CHEMO IV INFUSION ADDL HR: CPT

## 2017-01-24 PROCEDURE — A4212 NON CORING NEEDLE OR STYLET: HCPCS

## 2017-01-24 PROCEDURE — 96374 THER/PROPH/DIAG INJ IV PUSH: CPT

## 2017-01-24 PROCEDURE — 700105 HCHG RX REV CODE 258: Performed by: NURSE PRACTITIONER

## 2017-01-24 PROCEDURE — 96413 CHEMO IV INFUSION 1 HR: CPT

## 2017-01-24 RX ADMIN — MAGNESIUM SULFATE IN DEXTROSE 1 G: 10 INJECTION, SOLUTION INTRAVENOUS at 12:41

## 2017-01-24 RX ADMIN — DEXAMETHASONE SODIUM PHOSPHATE 12 MG: 4 INJECTION, SOLUTION INTRAMUSCULAR; INTRAVENOUS at 11:37

## 2017-01-24 RX ADMIN — ONDANSETRON HYDROCHLORIDE 16 MG: 2 SOLUTION INTRAMUSCULAR; INTRAVENOUS at 11:16

## 2017-01-24 RX ADMIN — PACLITAXEL 302.8 MG: 6 INJECTION, SOLUTION, CONCENTRATE INTRAVENOUS at 12:23

## 2017-01-24 RX ADMIN — FAMOTIDINE 20 MG: 10 INJECTION INTRAVENOUS at 11:41

## 2017-01-24 RX ADMIN — DIPHENHYDRAMINE HYDROCHLORIDE 25 MG: 50 INJECTION INTRAMUSCULAR; INTRAVENOUS at 10:59

## 2017-01-24 RX ADMIN — HEPARIN 500 UNITS: 100 SYRINGE at 16:08

## 2017-01-24 NOTE — PROGRESS NOTES
Chemotherapy Verification - PRIMARY RN      Height = 62.99  Weight = 68.2 kg  BSA = 1.74 m2       Medication: Paclitaxel  Dose: 175 mg/m2  Calculated Dose: 304 mg                             (In mg/m2, AUC, mg/kg)         I confirm this process was performed independently with the BSA and all final chemotherapy dosing calculations congruent.  Any discrepancies of 5% or greater have been addressed with the chemotherapy pharmacist. The resolution of the discrepancy has been documented in the EPIC progress notes.

## 2017-01-24 NOTE — PROGRESS NOTES
Chemotherapy Verification - SECONDARY RN       Height = 160 cm  Weight = 68.2 kg  BSA = 1.74 m2       Medication: Taxol  Dose: 175 mg/m2  Calculated Dose: 304.6 mg                             (In mg/m2, AUC, mg/kg)       I confirm that this process was performed independently.

## 2017-01-24 NOTE — PROGRESS NOTES
"Pharmacy Chemotherapy Verification    Patient Name: Rosanna Damico      Dx: Recurrent Ovarian Cancer    Cycle:  3   Previous treatment: 1/3/17   Protocol: Paclitaxel   Paclitaxel 175 mg/m2 IV over 3 hrs on day 1  Q21 days until disease progression or unacceptable toxicity  NCCN Guidelines for Ovarian Cancer. V.1.2016  Dunia LOPEZ, et al. Lancet 2003;361(5818):2429-789.          Allergies: Cisplatin and Codeine  /63 mmHg  Pulse 80  Temp(Src) 36.4 °C (97.5 °F)  Resp 18  Ht 1.6 m (5' 2.99\")  Wt 68.2 kg (150 lb 5.7 oz)  BMI 26.64 kg/m2  SpO2 98% Body surface area is 1.74 meters squared.     Labs 1/23/17   ANC~ 3100    Plt = 237 k Hgb = 11.4 SCr = 1.3 mg/dL CrCl ~ 41 mL/min (no dose adjustment recommended) AST/ALT/AP/Tbili = WNL  Mag = 1.7 (given 1 gm IVPB)     Paclitaxel (Taxol) 175 mg/m² x 1.74 m² = 304.5 mg   <5% difference, OK to treat with final dose = 302.8 mg IV      Jj Hugo, PharmD    "

## 2017-01-24 NOTE — PROGRESS NOTES
"Pharmacy Chemotherapy Verification    Patient Name: Rosanna Damico   Dx: Recurrent Ovarian Cancer  Protocol: Taxol     *Dosing Reference*  Paclitaxel 175 mg/m2 IV over 3 hrs on day 1  Q21 days until disease progression or unacceptable toxicity  NCCN Guidelines for Ovarian Cancer. V.1.2016  Dunia LOPEZ, et al. Lancet 2003;361(5011):2574-946.      Allergies:  Cisplatin and Codeine     /63 mmHg  Pulse 80  Temp(Src) 36.4 °C (97.5 °F)  Resp 18  Ht 1.6 m (5' 2.99\")  Wt 68.2 kg (150 lb 5.7 oz)  BMI 26.64 kg/m2  SpO2 98% Body surface area is 1.74 meters squared.  MD using baseline weight = 68 kg and baseline [place] BSA = 1.73 m2     Labs 1/23/17  ANC~ 3100  Plt = 237 k Hgb = 11.4   SCr = 1.3 mg/dL CrCl ~ 40.6 ml/min (no renal adjustment recommended for Paclitaxel)  AST/ALT/AP/Tbili = 20/25/73/0.4     Drug Order   (Drug name, dose, route, IV Fluid & volume, frequency, number of doses) Cycle: 3      Previous treatment: Cycle 2 = 1/3/17   Medication = PACLItaxel  Base Dose= 175 mg/m2   Calc Dose: Base Dose x 1.74 m2 = 304.5 mg  Final Dose = 302.8 mg  Route = IV  Fluid & Volume =  mL  Admin Duration = Over 3 hours          <5% difference, OK to treat with final dose     By my signature below, I confirm this process was performed independently with the BSA and all final chemotherapy dosing calculations congruent. I have reviewed the above chemotherapy order and that my calculation of the final dose and BSA (when applicable) corroborate those calculations of the  pharmacist. Discrepancies of 5% or greater in the written dose have been addressed and documented within the [facility] Progress notes.    Savana Tejada, PharmD            "

## 2017-01-25 NOTE — PROGRESS NOTES
Patient presents ambulatory for C3D1 of chemotherapy.  Port accessed using sterile technique and blood return noted. Labs drawn on 1/23/17, reviewed, creatinine 1.3.  Phone call to MD office, order received okay to proceed with treatment and to give 1L NS with treatment.  Pre medications given per order with no complications or adverse reactions.  Taxol given at rate with no complications or adverse reactions.  Port flushed per protocol, blood return noted, gauze, and tape applied to site.  Patient to have scan done and then MD will decide regarding further treatments and will schedule at that time.  Patient left ambulatory in no distress.

## 2017-02-07 ENCOUNTER — HOSPITAL ENCOUNTER (OUTPATIENT)
Dept: RADIOLOGY | Facility: MEDICAL CENTER | Age: 77
End: 2017-02-07
Attending: SPECIALIST
Payer: MEDICARE

## 2017-02-07 DIAGNOSIS — C56.9 MALIGNANT NEOPLASM OF OVARY, UNSPECIFIED LATERALITY (HCC): ICD-10-CM

## 2017-02-07 DIAGNOSIS — N18.9 CHRONIC KIDNEY DISEASE, UNSPECIFIED: ICD-10-CM

## 2017-02-07 PROCEDURE — 74177 CT ABD & PELVIS W/CONTRAST: CPT

## 2017-02-07 PROCEDURE — 700111 HCHG RX REV CODE 636 W/ 250 OVERRIDE (IP)

## 2017-02-07 PROCEDURE — 700117 HCHG RX CONTRAST REV CODE 255: Performed by: SPECIALIST

## 2017-02-07 RX ADMIN — HEPARIN 500 UNITS: 100 SYRINGE at 16:30

## 2017-02-07 RX ADMIN — IOHEXOL 100 ML: 350 INJECTION, SOLUTION INTRAVENOUS at 16:38

## 2017-02-08 NOTE — PROGRESS NOTES
R upper chest port de-accessed per protocol with 5 ml of Heparin 100units/ml. Good blood return, flushes well.

## 2017-02-22 ENCOUNTER — OUTPATIENT INFUSION SERVICES (OUTPATIENT)
Dept: ONCOLOGY | Facility: MEDICAL CENTER | Age: 77
End: 2017-02-22
Attending: SPECIALIST
Payer: MEDICARE

## 2017-02-22 VITALS
TEMPERATURE: 97.2 F | HEART RATE: 74 BPM | DIASTOLIC BLOOD PRESSURE: 66 MMHG | WEIGHT: 153.22 LBS | BODY MASS INDEX: 27.15 KG/M2 | SYSTOLIC BLOOD PRESSURE: 148 MMHG | OXYGEN SATURATION: 98 % | RESPIRATION RATE: 18 BRPM | HEIGHT: 63 IN

## 2017-02-22 DIAGNOSIS — T45.1X5A ANTINEOPLASTIC CHEMOTHERAPY INDUCED ANEMIA: ICD-10-CM

## 2017-02-22 DIAGNOSIS — M16.11 PRIMARY OSTEOARTHRITIS OF RIGHT HIP: ICD-10-CM

## 2017-02-22 DIAGNOSIS — M81.0 OSTEOPOROSIS: ICD-10-CM

## 2017-02-22 DIAGNOSIS — M70.71 BURSITIS OF RIGHT HIP: ICD-10-CM

## 2017-02-22 DIAGNOSIS — Z51.11 ENCOUNTER FOR ANTINEOPLASTIC CHEMOTHERAPY: ICD-10-CM

## 2017-02-22 DIAGNOSIS — Z85.43 HISTORY OF OVARIAN CANCER: ICD-10-CM

## 2017-02-22 DIAGNOSIS — I10 ESSENTIAL HYPERTENSION, MALIGNANT: ICD-10-CM

## 2017-02-22 DIAGNOSIS — D64.81 ANTINEOPLASTIC CHEMOTHERAPY INDUCED ANEMIA: ICD-10-CM

## 2017-02-22 DIAGNOSIS — N13.4 HYDROURETER: ICD-10-CM

## 2017-02-22 LAB
APPEARANCE UR: ABNORMAL
BACTERIA #/AREA URNS HPF: ABNORMAL /HPF
BILIRUB UR QL STRIP.AUTO: NEGATIVE
COLOR UR: ABNORMAL
EPI CELLS #/AREA URNS HPF: ABNORMAL /HPF
GLUCOSE UR STRIP.AUTO-MCNC: NEGATIVE MG/DL
KETONES UR STRIP.AUTO-MCNC: NEGATIVE MG/DL
LEUKOCYTE ESTERASE UR QL STRIP.AUTO: ABNORMAL
MICRO URNS: ABNORMAL
NITRITE UR QL STRIP.AUTO: NEGATIVE
PH UR STRIP.AUTO: 6.5 [PH]
PROT UR QL STRIP: NEGATIVE MG/DL
RBC # URNS HPF: ABNORMAL /HPF
RBC UR QL AUTO: ABNORMAL
SP GR UR STRIP.AUTO: 1.01
WBC #/AREA URNS HPF: >150 /HPF
YEAST HYPHAE #/AREA URNS HPF: PRESENT /HPF

## 2017-02-22 PROCEDURE — 96415 CHEMO IV INFUSION ADDL HR: CPT

## 2017-02-22 PROCEDURE — 700111 HCHG RX REV CODE 636 W/ 250 OVERRIDE (IP): Performed by: NURSE PRACTITIONER

## 2017-02-22 PROCEDURE — 81001 URINALYSIS AUTO W/SCOPE: CPT

## 2017-02-22 PROCEDURE — 700105 HCHG RX REV CODE 258: Performed by: NURSE PRACTITIONER

## 2017-02-22 PROCEDURE — A4212 NON CORING NEEDLE OR STYLET: HCPCS

## 2017-02-22 PROCEDURE — 96413 CHEMO IV INFUSION 1 HR: CPT

## 2017-02-22 PROCEDURE — 96375 TX/PRO/DX INJ NEW DRUG ADDON: CPT

## 2017-02-22 PROCEDURE — 304540 HCHG NITRO SET VENT 2ND TUB

## 2017-02-22 RX ORDER — SODIUM CHLORIDE 9 MG/ML
1000 INJECTION, SOLUTION INTRAVENOUS ONCE
Status: COMPLETED | OUTPATIENT
Start: 2017-02-22 | End: 2017-02-22

## 2017-02-22 RX ADMIN — PACLITAXEL 302.8 MG: 6 INJECTION, SOLUTION, CONCENTRATE INTRAVENOUS at 15:42

## 2017-02-22 RX ADMIN — ONDANSETRON HYDROCHLORIDE 16 MG: 2 SOLUTION INTRAMUSCULAR; INTRAVENOUS at 15:01

## 2017-02-22 RX ADMIN — SODIUM CHLORIDE 1000 ML: 9 INJECTION, SOLUTION INTRAVENOUS at 14:40

## 2017-02-22 RX ADMIN — DIPHENHYDRAMINE HYDROCHLORIDE 25 MG: 50 INJECTION INTRAMUSCULAR; INTRAVENOUS at 15:20

## 2017-02-22 RX ADMIN — DEXAMETHASONE SODIUM PHOSPHATE 12 MG: 4 INJECTION, SOLUTION INTRAMUSCULAR; INTRAVENOUS at 14:43

## 2017-02-22 RX ADMIN — FAMOTIDINE 20 MG: 10 INJECTION INTRAVENOUS at 14:41

## 2017-02-22 RX ADMIN — HEPARIN 500 UNITS: 100 SYRINGE at 18:50

## 2017-02-22 ASSESSMENT — PAIN SCALES - GENERAL: PAINLEVEL: NO PAIN

## 2017-02-22 NOTE — PROGRESS NOTES
"Pharmacy Chemotherapy Verification    Patient Name: Rosanna Damico      Dx: Recurrent Ovarian Cancer  Cycle:  4 Previous treatment: 1/24/17     Protocol: Paclitaxel   Paclitaxel 175 mg/m2 IV over 3 hrs on day 1  Q21 days until disease progression or unacceptable toxicity  NCCN Guidelines for Ovarian Cancer. V.1.2016  Dunia LOPEZ, et al. Lancet 2003;361(3979):9176-834.          Allergies: Cisplatin and Codeine  /66 mmHg  Pulse 74  Temp(Src) 36.2 °C (97.2 °F)  Resp 18  Ht 1.6 m (5' 2.99\")  Wt 69.5 kg (153 lb 3.5 oz)  BMI 27.15 kg/m2  SpO2 98% Body surface area is 1.76 meters squared.     Labs 2/20/17   ANC~3300    Plt = 256k Hgb = 11.4 SCr = 1.5 mg/dL CrCl ~36 mL/min (no dose adjustment recommended) AST/ALT/AP/Tbili = WNL  Mag = 1.8     Paclitaxel (Taxol) 175 mg/m² x 1.76 m² = 308 mg   <5% difference, OK to treat with final dose = 302.8 mg IV      Elma Turner, PharmD       "

## 2017-02-22 NOTE — PROGRESS NOTES
Chemotherapy Verification - SECONDARY RN       Height = 160 cm  Weight = 69.5 kg  BSA = 1.757 m2       Medication: Taxol  Dose: 175 mg/m2  Calculated Dose: 307.56 mg                             (In mg/m2, AUC, mg/kg)     I confirm that this process was performed independently.

## 2017-02-22 NOTE — PROGRESS NOTES
"Pharmacy Chemotherapy Verification    Patient Name: Rosanna Damico   Dx: Recurrent Ovarian Cancer    Protocol: Taxol     *Dosing Reference*  Paclitaxel 175 mg/m2 IV over 3 hrs on day 1  Q21 days until disease progression or unacceptable toxicity  NCCN Guidelines for Ovarian Cancer. V.1.2016  Dunia LOPEZ, et al. Lancet 2003;361(7630):5727-077.      Allergies:  Cisplatin and Codeine     /66 mmHg  Pulse 74  Temp(Src) 36.2 °C (97.2 °F)  Resp 18  Ht 1.6 m (5' 2.99\")  Wt 69.5 kg (153 lb 3.5 oz)  BMI 27.15 kg/m2  SpO2 98% Body surface area is 1.76 meters squared.  MD using baseline weight = 68 kg and baseline [place] BSA = 1.73 m2     2/20/17:  ANC~ 3300 Plt = 256k   Hgb = 11.4     SCr = 1.5mg/dL CrCl ~ 36mL/min (no renal adj required)  LFT's = WNl TBili = 0.5        Drug Order   (Drug name, dose, route, IV Fluid & volume, frequency, number of doses) Cycle: 4  - delayed 1 week   Previous treatment: Cycle 3 = 1/24/17   Medication = PACLItaxel  Base Dose= 175 mg/m2   Calc Dose: Base Dose x 1.76m2 = 308 mg  Final Dose = 302.8mg  Route = IV  Fluid & Volume =  mL  Admin Duration = Over 3 hours          <5% difference, OK to treat with final dose     By my signature below, I confirm this process was performed independently with the BSA and all final chemotherapy dosing calculations congruent. I have reviewed the above chemotherapy order and that my calculation of the final dose and BSA (when applicable) corroborate those calculations of the  pharmacist. Discrepancies of 5% or greater in the written dose have been addressed and documented within the [facility] Progress notes.    Lillie PeralesD.              "

## 2017-02-22 NOTE — PROGRESS NOTES
Chemotherapy Verification - PRIMARY RN      Height = 160cm  Weight = 69.5kg  BSA =1.76m2 baseline dosing using weight 68kg & BSA = 1.74m2      Medication: Paclitaxel  Dose: 175mg/m2  Calculated Dose: 304.5mg                            (In mg/m2, AUC, mg/kg)           I confirm this process was performed independently with the BSA and all final chemotherapy dosing calculations congruent.  Any discrepancies of 5% or greater have been addressed with the chemotherapy pharmacist. The resolution of the discrepancy has been documented in the EPIC progress notes.

## 2017-02-23 NOTE — PROGRESS NOTES
"Prior to pt arrival call placed to [facility] to obtain lab results from 2/20/17 as they did not auto-populate into EPIC. Pt arrives ambulatory for D1C4 Taxol therapy. Reports that she is feeling well today and that she is overdue for her chemo because she had a CT scan and f/u w/ Dr Delcid prior to scheduling chemo. Assessment complete. POC reviewed. Informed pt a  was not drawn at [place] and pt states she does not want to draw  today and has discussed this with Meggan MELCHOR that she wants to run the test monthly. Pt reports having cloudy urine and has had this since her stent placement but states she often gets UTIs and she never received the results from last test. After reviewing UA from 2/1/17 that comments state it needs to be re-collected call placed to Meggan MELCHOR to notify of UA result and creatinine level of 1.5; okay to proceed w/ treatment received, collect new UA today no need to wait for result to proceed. Pt updated.Urine specimen collected. R Port accessed in sterile fashion; brisk blood return observed. Premedication and hydration administered as ordered. Taxol administered as ordered & w/o adverse s/sx reported or observed. Port flushed per policy, heparinized & de-accessed w/ NCN intact, gauze dressing applied. Future appt confirmed. Pt d/c'd in great spirits to care of spouse \"Koby\". UA result faxed to Meggan MELCHOR.   "

## 2017-03-07 ENCOUNTER — APPOINTMENT (OUTPATIENT)
Dept: ONCOLOGY | Facility: MEDICAL CENTER | Age: 77
End: 2017-03-07
Attending: SPECIALIST
Payer: MEDICARE

## 2017-03-14 ENCOUNTER — OUTPATIENT INFUSION SERVICES (OUTPATIENT)
Dept: ONCOLOGY | Facility: MEDICAL CENTER | Age: 77
End: 2017-03-14
Attending: SPECIALIST
Payer: MEDICARE

## 2017-03-14 VITALS
BODY MASS INDEX: 26.31 KG/M2 | OXYGEN SATURATION: 99 % | HEART RATE: 70 BPM | HEIGHT: 64 IN | DIASTOLIC BLOOD PRESSURE: 51 MMHG | TEMPERATURE: 97.1 F | WEIGHT: 154.1 LBS | SYSTOLIC BLOOD PRESSURE: 127 MMHG | RESPIRATION RATE: 18 BRPM

## 2017-03-14 DIAGNOSIS — Z85.43 HISTORY OF OVARIAN CANCER: ICD-10-CM

## 2017-03-14 DIAGNOSIS — Z51.11 ENCOUNTER FOR ANTINEOPLASTIC CHEMOTHERAPY: ICD-10-CM

## 2017-03-14 PROCEDURE — A4212 NON CORING NEEDLE OR STYLET: HCPCS

## 2017-03-14 PROCEDURE — 700111 HCHG RX REV CODE 636 W/ 250 OVERRIDE (IP): Performed by: NURSE PRACTITIONER

## 2017-03-14 PROCEDURE — 304540 HCHG NITRO SET VENT 2ND TUB

## 2017-03-14 PROCEDURE — 96361 HYDRATE IV INFUSION ADD-ON: CPT

## 2017-03-14 PROCEDURE — 700105 HCHG RX REV CODE 258: Performed by: NURSE PRACTITIONER

## 2017-03-14 PROCEDURE — 96375 TX/PRO/DX INJ NEW DRUG ADDON: CPT

## 2017-03-14 PROCEDURE — 96413 CHEMO IV INFUSION 1 HR: CPT

## 2017-03-14 PROCEDURE — 96415 CHEMO IV INFUSION ADDL HR: CPT

## 2017-03-14 RX ORDER — SODIUM CHLORIDE 9 MG/ML
INJECTION, SOLUTION INTRAVENOUS CONTINUOUS
Status: DISCONTINUED | OUTPATIENT
Start: 2017-03-14 | End: 2017-03-14 | Stop reason: HOSPADM

## 2017-03-14 RX ORDER — SODIUM CHLORIDE 9 MG/ML
1000 INJECTION, SOLUTION INTRAVENOUS ONCE
Status: COMPLETED | OUTPATIENT
Start: 2017-03-14 | End: 2017-03-14

## 2017-03-14 RX ADMIN — HEPARIN 500 UNITS: 100 SYRINGE at 14:38

## 2017-03-14 RX ADMIN — PACLITAXEL 302.8 MG: 6 INJECTION, SOLUTION, CONCENTRATE INTRAVENOUS at 11:30

## 2017-03-14 RX ADMIN — ONDANSETRON HYDROCHLORIDE 16 MG: 2 SOLUTION INTRAMUSCULAR; INTRAVENOUS at 10:54

## 2017-03-14 RX ADMIN — FAMOTIDINE 20 MG: 10 INJECTION INTRAVENOUS at 10:36

## 2017-03-14 RX ADMIN — SODIUM CHLORIDE 1000 ML: 9 INJECTION, SOLUTION INTRAVENOUS at 10:36

## 2017-03-14 RX ADMIN — DEXAMETHASONE SODIUM PHOSPHATE 12 MG: 4 INJECTION, SOLUTION INTRAMUSCULAR; INTRAVENOUS at 11:13

## 2017-03-14 RX ADMIN — DIPHENHYDRAMINE HYDROCHLORIDE 25 MG: 50 INJECTION INTRAMUSCULAR; INTRAVENOUS at 10:42

## 2017-03-14 RX ADMIN — SODIUM CHLORIDE: 9 INJECTION, SOLUTION INTRAVENOUS at 10:38

## 2017-03-14 ASSESSMENT — PAIN SCALES - GENERAL: PAINLEVEL: NO PAIN

## 2017-03-14 NOTE — PROGRESS NOTES
Chemotherapy Verification - SECONDARY RN       Height = 161.9 cm  Weight = 69.9 kg  BSA = 1.77 m2       Medication: Taxol  Dose: 175 mg/m2  Calculated Dose: 309.75 mg                             (In mg/m2, AUC, mg/kg)         I confirm that this process was performed independently.

## 2017-03-14 NOTE — PROGRESS NOTES
"Pt here for C5 of 6 Taxol.  Labs from yesterday reviewed.  Pt states no changes, although elevated, her creatinine has improved for her.  Port accessed in sterile field.  Premeds and hydration given.  Pt requested hydration to run entire time during chemo infusion to prevent feeling \"too full\".  Port flushed per policy and de-accessed.  Next apt confirmed.  Pt left IC, no s/s of distress  "

## 2017-03-14 NOTE — PROGRESS NOTES
Chemotherapy Verification - PRIMARY RN      Height = 161.9 cm  Weight = 69.9 kg  BSA = 1.77 m2       Medication: Taxol  Dose: 175 mg/m2  Calculated Dose: 309.75 mg                             (In mg/m2, AUC, mg/kg)       I confirm this process was performed independently with the BSA and all final chemotherapy dosing calculations congruent.  Any discrepancies of 5% or greater have been addressed with the chemotherapy pharmacist. The resolution of the discrepancy has been documented in the EPIC progress notes.

## 2017-03-14 NOTE — PROGRESS NOTES
"Pharmacy Chemotherapy Verification    Patient Name: Rosanna Damico   Dx: Recurrent Ovarian Cancer    Protocol: Taxol     Paclitaxel (Taxol) 175 mg/m2 IV over 3 hrs on day 1  Q21 days until disease progression or unacceptable toxicity  NCCN Guidelines for Ovarian Cancer. V.1.2016  Dunia LOPEZ, et al. Lancet 2003;361(4326):4708-067.      Allergies:  Cisplatin and Codeine     /51 mmHg  Pulse 70  Temp(Src) 36.2 °C (97.1 °F)  Resp 18  Ht 1.619 m (5' 3.75\")  Wt 69.9 kg (154 lb 1.6 oz)  BMI 26.67 kg/m2  SpO2 99% Body surface area is 1.77 meters squared.  MD using baseline weight = 68 kg and baseline [place] BSA = 1.73 m2     3/13/17:  ANC~ 3200 Plt = 223k   Hgb = 11.4     SCr = 1.4mg/dL CrCl ~39 mL/min (no renal adj required)  LFT's = WNL TBili = 0.5        Drug Order   (Drug name, dose, route, IV Fluid & volume, frequency, number of doses) Cycle: 5  Previous treatment: Cycle 4 2/22/17   Medication = PACLItaxel  Base Dose= 175 mg/m2   Calc Dose: Base Dose x 1.77m2 = 309.75 mg  Final Dose = 302.8mg  Route = IV  Fluid & Volume =  mL  Admin Duration = Over 3 hours          <5% difference, OK to treat with final dose     By my signature below, I confirm this process was performed independently with the BSA and all final chemotherapy dosing calculations congruent. I have reviewed the above chemotherapy order and that my calculation of the final dose and BSA (when applicable) corroborate those calculations of the  pharmacist. Discrepancies of 5% or greater in the written dose have been addressed and documented within the [facility] Progress notes.    Alma EscaleraD                 "

## 2017-03-14 NOTE — PROGRESS NOTES
"Pharmacy Chemotherapy Verification    Patient Name: Rosanna Damico      Dx: Recurrent Ovarian Cancer  Cycle:  5 Previous treatment: 2/22/17    Protocol: Paclitaxel   Paclitaxel 175 mg/m2 IV over 3 hrs on day 1  Q21 days until disease progression or unacceptable toxicity  NCCN Guidelines for Ovarian Cancer. V.1.2016  Dunia LOPEZ, et al. Lancet 2003;361(0791):3643-446.          Allergies: Cisplatin and Codeine  /51 mmHg  Pulse 70  Temp(Src) 36.2 °C (97.1 °F)  Resp 18  Ht 1.619 m (5' 3.75\")  Wt 69.9 kg (154 lb 1.6 oz)  BMI 26.67 kg/m2  SpO2 99% Body surface area is 1.77 meters squared.     3/13/17  ANC~ 3200 Plt = 223k   Hgb = 11.4     SCr = 1.4mg/dL CrCl ~ 39mL/min (no renal dose adj required)  LFT's = WNL TBili = 0.5    MD aware of all current lab results. Orders received to proceed with treatment 3/14/17.        Paclitaxel (Taxol) 175 mg/m² x 1.77m² = 309.8mg   <5% difference, OK to treat with final dose = 302.8mg IV      CHRISTOPHER Torres Pharm.D.        "

## 2017-04-04 ENCOUNTER — OUTPATIENT INFUSION SERVICES (OUTPATIENT)
Dept: ONCOLOGY | Facility: MEDICAL CENTER | Age: 77
End: 2017-04-04
Attending: SPECIALIST
Payer: MEDICARE

## 2017-04-04 VITALS
HEIGHT: 66 IN | BODY MASS INDEX: 23.84 KG/M2 | SYSTOLIC BLOOD PRESSURE: 120 MMHG | DIASTOLIC BLOOD PRESSURE: 52 MMHG | OXYGEN SATURATION: 98 % | WEIGHT: 148.37 LBS | RESPIRATION RATE: 18 BRPM | TEMPERATURE: 97.5 F | HEART RATE: 66 BPM

## 2017-04-04 DIAGNOSIS — Z85.43 HISTORY OF OVARIAN CANCER: ICD-10-CM

## 2017-04-04 DIAGNOSIS — Z51.11 ENCOUNTER FOR ANTINEOPLASTIC CHEMOTHERAPY: ICD-10-CM

## 2017-04-04 PROCEDURE — 304540 HCHG NITRO SET VENT 2ND TUB

## 2017-04-04 PROCEDURE — 96375 TX/PRO/DX INJ NEW DRUG ADDON: CPT

## 2017-04-04 PROCEDURE — 700111 HCHG RX REV CODE 636 W/ 250 OVERRIDE (IP)

## 2017-04-04 PROCEDURE — A4212 NON CORING NEEDLE OR STYLET: HCPCS

## 2017-04-04 PROCEDURE — 700111 HCHG RX REV CODE 636 W/ 250 OVERRIDE (IP): Performed by: NURSE PRACTITIONER

## 2017-04-04 PROCEDURE — 700105 HCHG RX REV CODE 258: Performed by: NURSE PRACTITIONER

## 2017-04-04 PROCEDURE — 96415 CHEMO IV INFUSION ADDL HR: CPT

## 2017-04-04 PROCEDURE — 96413 CHEMO IV INFUSION 1 HR: CPT

## 2017-04-04 PROCEDURE — 700111 HCHG RX REV CODE 636 W/ 250 OVERRIDE (IP): Performed by: SPECIALIST

## 2017-04-04 PROCEDURE — 96368 THER/DIAG CONCURRENT INF: CPT

## 2017-04-04 RX ORDER — SODIUM CHLORIDE 9 MG/ML
1000 INJECTION, SOLUTION INTRAVENOUS ONCE
Status: COMPLETED | OUTPATIENT
Start: 2017-04-04 | End: 2017-04-04

## 2017-04-04 RX ADMIN — FAMOTIDINE 20 MG: 10 INJECTION INTRAVENOUS at 11:21

## 2017-04-04 RX ADMIN — SODIUM CHLORIDE 1000 ML: 9 INJECTION, SOLUTION INTRAVENOUS at 11:10

## 2017-04-04 RX ADMIN — DEXAMETHASONE SODIUM PHOSPHATE 12 MG: 4 INJECTION, SOLUTION INTRAMUSCULAR; INTRAVENOUS at 11:30

## 2017-04-04 RX ADMIN — PACLITAXEL 302.8 MG: 6 INJECTION, SOLUTION, CONCENTRATE INTRAVENOUS at 12:21

## 2017-04-04 RX ADMIN — MAGNESIUM SULFATE IN DEXTROSE 1 G: 10 INJECTION, SOLUTION INTRAVENOUS at 12:05

## 2017-04-04 RX ADMIN — HEPARIN 500 UNITS: 100 SYRINGE at 15:48

## 2017-04-04 RX ADMIN — ONDANSETRON HYDROCHLORIDE 16 MG: 2 SOLUTION INTRAMUSCULAR; INTRAVENOUS at 11:40

## 2017-04-04 RX ADMIN — DIPHENHYDRAMINE HYDROCHLORIDE 25 MG: 50 INJECTION INTRAMUSCULAR; INTRAVENOUS at 11:55

## 2017-04-04 ASSESSMENT — PAIN SCALES - GENERAL: PAINLEVEL: NO PAIN

## 2017-04-04 NOTE — PROGRESS NOTES
Chemotherapy Verification - SECONDARY RN       Height = 168 cm  Weight = 67.3 kg  BSA = 1.77 m2       Medication: Taxol  Dose: 175 mg/m2  Calculated Dose: 309.75 mg                             (In mg/m2, AUC, mg/kg)       I confirm that this process was performed independently.

## 2017-04-04 NOTE — PROGRESS NOTES
Chemotherapy Verification - PRIMARY RN      Height = 168cm  Weight = 67.3kg  BSA = 1.77m2       Medication: Taxol  Dose: 175mg/m2  Calculated Dose: 309.75mg                             (In mg/m2, AUC, mg/kg)           I confirm this process was performed independently with the BSA and all final chemotherapy dosing calculations congruent.  Any discrepancies of 5% or greater have been addressed with the chemotherapy pharmacist. The resolution of the discrepancy has been documented in the EPIC progress notes.

## 2017-04-04 NOTE — PROGRESS NOTES
"Pharmacy Chemotherapy Verification    Patient Name: Rosanna Damico      Dx: Recurrent Ovarian Cancer  Cycle:  6 Previous treatment: 3/14/17    Protocol: Paclitaxel   Paclitaxel 175 mg/m2 IV over 3 hrs on day 1  Q21 days until disease progression or unacceptable toxicity  NCCN Guidelines for Ovarian Cancer. V.1.2016  Dunia LOPEZ, et al. Lancet 2003;361(1601):5259-152.          Allergies: Cisplatin and Codeine  /52 mmHg  Pulse 66  Temp(Src) 36.4 °C (97.5 °F)  Resp 18  Ht 1.68 m (5' 6.14\")  Wt 67.3 kg (148 lb 5.9 oz)  BMI 23.84 kg/m2  SpO2 98% Body surface area is 1.77 meters squared.     4/3/17  ANC~ 2400 Plt = 226k   Hgb = 11.7     SCr = 1.4mg/dL CrCl ~ 37mL/min --h/o renal insufficiency, MD aware and ok to proceed   LFT's = WNL TBili = 0.5         Paclitaxel (Taxol) 175 mg/m² x 1.77 m² = 309.75 mg   <5% difference, OK to treat with final dose = 302.8 mg IV      Elma Turner, PharmD         "

## 2017-04-04 NOTE — PROGRESS NOTES
"Pharmacy Chemotherapy Verification    Patient Name: Rosanna Damico   Dx: Recurrent Ovarian Cancer    Protocol: Taxol     Paclitaxel (Taxol) 175 mg/m2 IV over 3 hrs on day 1  Q21 days until disease progression or unacceptable toxicity  NCCN Guidelines for Ovarian Cancer. V.1.2016  Dunia LOPEZ, et al. Lancet 2003;361(7603):2137-774.      Allergies:  Cisplatin and Codeine     /52 mmHg  Pulse 66  Temp(Src) 36.4 °C (97.5 °F)  Resp 18  Ht 1.68 m (5' 6.14\")  Wt 67.3 kg (148 lb 5.9 oz)  BMI 23.84 kg/m2  SpO2 98% Body surface area is 1.77 meters squared.  MD using baseline weight = 68 kg and baseline [place] BSA = 1.73 m2     4/3/17:  ANC~ 2400 Plt = 226k   Hgb = 11.7  SCr = 1.4 mg/dL CrCl ~37 mL/min (no renal adj required)  LFT's = WNL TBili = 0.5      Drug Order   (Drug name, dose, route, IV Fluid & volume, frequency, number of doses) Cycle: 6   Previous treatment: Cycle 3/14/17   Medication = PACLItaxel  Base Dose= 175 mg/m2   Calc Dose: Base Dose x 1.77 m2 = 310 mg  Final Dose = 302.8 mg  Route = IV  Fluid & Volume =  mL  Admin Duration = Over 3 hours          <5% difference, OK to treat with final dose     By my signature below, I confirm this process was performed independently with the BSA and all final chemotherapy dosing calculations congruent. I have reviewed the above chemotherapy order and that my calculation of the final dose and BSA (when applicable) corroborate those calculations of the  pharmacist. Discrepancies of 5% or greater in the written dose have been addressed and documented within the [facility] Progress notes.    Jj Hugo PharmD    "

## 2017-04-05 NOTE — PROGRESS NOTES
Pt is here for her scheduled chemotherapy. Labs reviewed. Magnesium 1.7 replaced per protocol with 1gm IV. OK to proceed with Cr 1.4 per MD. Port accessed in sterile fashion. Premedicated as ordered. Hydration completed. Treatment completed without an incident. Pt states she has no more chemotherapy scheduled at this time - she will have a scan and her treatment plan will be updated based on results. She lives at [facility] and gets her port flushes there. Discharged home to self care. Pt will schedule her future appointments as needed.

## 2017-04-10 ENCOUNTER — OFFICE VISIT (OUTPATIENT)
Dept: CARDIOLOGY | Facility: PHYSICIAN GROUP | Age: 77
End: 2017-04-10
Payer: MEDICARE

## 2017-04-10 VITALS
OXYGEN SATURATION: 94 % | DIASTOLIC BLOOD PRESSURE: 54 MMHG | BODY MASS INDEX: 24.24 KG/M2 | SYSTOLIC BLOOD PRESSURE: 110 MMHG | HEART RATE: 64 BPM | WEIGHT: 142 LBS | HEIGHT: 64 IN

## 2017-04-10 DIAGNOSIS — R00.2 PALPITATIONS: ICD-10-CM

## 2017-04-10 DIAGNOSIS — I10 ESSENTIAL HYPERTENSION: ICD-10-CM

## 2017-04-10 PROCEDURE — 99214 OFFICE O/P EST MOD 30 MIN: CPT | Performed by: INTERNAL MEDICINE

## 2017-04-10 PROCEDURE — 4040F PNEUMOC VAC/ADMIN/RCVD: CPT | Performed by: INTERNAL MEDICINE

## 2017-04-10 PROCEDURE — G8432 DEP SCR NOT DOC, RNG: HCPCS | Performed by: INTERNAL MEDICINE

## 2017-04-10 PROCEDURE — 1101F PT FALLS ASSESS-DOCD LE1/YR: CPT | Mod: 8P | Performed by: INTERNAL MEDICINE

## 2017-04-10 PROCEDURE — 3017F COLORECTAL CA SCREEN DOC REV: CPT | Performed by: INTERNAL MEDICINE

## 2017-04-10 PROCEDURE — 1036F TOBACCO NON-USER: CPT | Performed by: INTERNAL MEDICINE

## 2017-04-10 PROCEDURE — 3014F SCREEN MAMMO DOC REV: CPT | Performed by: INTERNAL MEDICINE

## 2017-04-10 PROCEDURE — G8420 CALC BMI NORM PARAMETERS: HCPCS | Performed by: INTERNAL MEDICINE

## 2017-04-10 ASSESSMENT — ENCOUNTER SYMPTOMS: PALPITATIONS: 0

## 2017-04-10 NOTE — MR AVS SNAPSHOT
"        Rosanna Damico   4/10/2017 9:00 AM   Office Visit   MRN: [id number]    Department:  [facility]   Dept Phone:  [phone]    Description:  Female : 1940   Provider:  Renny Lua M.D.           Reason for Visit     Follow-Up           Allergies as of 4/10/2017     Allergen Noted Reactions    Cisplatin 2013   Unspecified    \"sever kidney reaction when received IP chemo\"  PNF=3134    Codeine 2011   Itching, Vomiting    RXN=>10 years ago      Vital Signs     Blood Pressure Pulse Height Weight Body Mass Index Oxygen Saturation    110/54 mmHg 64 1.626 m (5' 4.02\") 64.411 kg (142 lb) 24.36 kg/m2 94%    Smoking Status                   Never Smoker            Basic Information     Date Of Birth Sex Race Ethnicity Preferred Language    1940 Female White Non- English      Your appointments     May 02, 2017  9:00 AM   CT BODY WITH with [address]   [facility] - CT - [address])    [address]  [place] [address] [phone]           Taking medications as regularly scheduled is strongly encouraged.  *For Abdominal CT-Patient needs to  oral contrast and instruction from the department at least 2 hours prior to exam. Patient may  contrast at any imaging facility.              Problem List              ICD-10-CM Priority Class Noted - Resolved    Hypertension I10   2012 - Present    Other and unspecified hyperlipidemia E78.5   2012 - Present    Encounter for antineoplastic chemotherapy Z51.11   2012 - Present    Essential hypertension, malignant I10   2012 - Present    History of ovarian cancer Z85.43   2015 - Present    Hypothyroid E03.9   2015 - Present    Osteoporosis M81.0   2015 - Present    Malignant neoplasm of ovary (CMS-HCC) C56.9   2016 - Present    Bursitis of right hip M70.71   2016 - Present    HLD (hyperlipidemia) E78.5   2016 - Present    Primary osteoarthritis of right " hip M16.11   6/22/2016 - Present    Antineoplastic chemotherapy induced anemia D64.81, T45.1X5A   9/27/2016 - Present    Hydroureter N13.4   10/6/2016 - Present      Health Maintenance        Date Due Completion Dates    IMM DTaP/Tdap/Td Vaccine (1 - Tdap) 8/31/1961 ---    IMM ZOSTER VACCINE 8/31/2002 ---    IMM PNEUMOCOCCAL 65+ (ADULT) LOW/MEDIUM RISK SERIES (2 of 2 - PPSV23) 4/24/2016 4/24/2015, 11/9/2009    BONE DENSITY 4/24/2017 4/24/2012 (Prv Comp)    Override on 4/24/2012: Previously completed    MAMMOGRAM 7/1/2017 7/1/2016, 12/11/2014, 7/31/2013    PAP SMEAR 4/25/2019 4/25/2016    COLONOSCOPY 5/29/2022 5/29/2012 (N/S), 5/29/2012    Override on 5/29/2012: (N/S) (normal)            Current Immunizations     13-VALENT PCV PREVNAR 4/24/2015, 11/9/2009    Influenza TIV (IM) 10/16/2013, 9/27/2012 11:25 AM      Below and/or attached are the medications your provider expects you to take. Review all of your home medications and newly ordered medications with your provider and/or pharmacist. Follow medication instructions as directed by your provider and/or pharmacist. Please keep your medication list with you and share with your provider. Update the information when medications are discontinued, doses are changed, or new medications (including over-the-counter products) are added; and carry medication information at all times in the event of emergency situations     Allergies:  CISPLATIN - Unspecified     CODEINE - Itching,Vomiting               Medications  Valid as of: April 10, 2017 -  9:21 AM    Generic Name Brand Name Tablet Size Instructions for use    Acetaminophen (Tab) TYLENOL 325 MG Take 650 mg by mouth every four hours as needed.        AmLODIPine Besylate (Tab) NORVASC 10 MG Take 1 Tab by mouth every day.        Aspirin (Tab)  MG Take 325 mg by mouth every 6 hours as needed.        B Complex-Folic Acid   Take  by mouth.        Calcium Carbonate-Vitamin D (Tab) OSCAL-250 250-125 MG-UNIT Take 1 Tab by  mouth every day.        Cholecalciferol (Cap) Vitamin D 2000 UNITS Take 1 Cap by mouth every day.        Dexamethasone (Tab) DECADRON 4 MG Take 2 mg by mouth 2 times a day.        Ferrous Gluconate (Tab) FERGON 324 (38 FE) MG Take 324 mg by mouth every morning with breakfast.        Fluconazole (Tab) DIFLUCAN 200 MG Take 200 mg by mouth every day.        Levothyroxine Sodium (Tab) SYNTHROID 75 MCG TAKE 1 TABLET BY MOUTH EVERY DAY        Magnesium (Tab) Magnesium 500 MG Take 1 Tab by mouth 2 Times a Day.        Metoprolol Tartrate (Tab) LOPRESSOR 25 MG Take 1 Tab by mouth 2 times a day.        Ondansetron HCl (Tab) ZOFRAN 4 MG Take 4 mg by mouth every four hours as needed for Nausea/Vomiting.        Prochlorperazine Maleate   Take  by mouth.        Sulfamethoxazole-Trimethoprim (Suspension) BACTRIM,SEPTRA 200-40 MG/5ML Take 8 mg/kg/day by mouth 2 times a day.        .                 Medicines prescribed today were sent to:     [id number] - [place], [place] - [address]    [address] [facility] [address]    Phone: [phone] Fax: [phone]    Open 24 Hours?: No    [facility] - [place], [place] - [address]    [address] [address]    Phone: [phone] Fax: [phone]    Open 24 Hours?: No      Medication refill instructions:       If your prescription bottle indicates you have medication refills left, it is not necessary to call your provider’s office. Please contact your pharmacy and they will refill your medication.    If your prescription bottle indicates you do not have any refills left, you may request refills at any time through one of the following ways: The online [employer] system (except Urgent Care), by calling your provider’s office, or by asking your pharmacy to contact your provider’s office with a refill request. Medication refills are processed only during regular business hours and may not be available until the next  business day. Your provider may request additional information or to have a follow-up visit with you prior to refilling your medication.   *Please Note: Medication refills are assigned a new Rx number when refilled electronically. Your pharmacy may indicate that no refills were authorized even though a new prescription for the same medication is available at the pharmacy. Please request the medicine by name with the pharmacy before contacting your provider for a refill.        Instructions    Try [employer] orth[employer] and [facility]'s pharmacy for your compression garments          MyChart Access Code: Activation code not generated  Current MyChart Status: Active

## 2017-04-10 NOTE — Clinical Note
Name:          Rosanna Damico   YOB: 1940  Date:     4/10/2017      CHARLES Dale  [address]  [facility] [place] [facility] [address]     Renny Lua MD  [address], [facility] [address]  [place] [address]  Phone: [phone]  Back Line: [phone]  Fax: [phone]  E-mail: [email or web address]   Dear Dr. Johnson,    We had the pleasure of seeing your patient, Rosanna Damico, in Cardiology Clinic at [facility] and Vascular today.    As you know, she is a 76-year-old woman with a history of ovarian cancer and hypertension with former PRES syndrome diagnosed at the time of a hypertensive episode referred for evaluation of palpitations.    She is doing well from a cardiovascular perspective, and has no complaints today in that regard. Her blood pressure is 110/54 mmHg, and she has solidly palpitations. We did discuss what sound to be some side effects of her chemotherapy regimen and her lower extremity edema. She will continue with the physical therapist and working on her lymphedema, and has another round of chemotherapy upcoming. I have not changed her cardiovascular regimen nor ordered additional testing at this time.    We will have the patient follow-up in one year.    Thank you for the referral and please do not hesitate to contact me at any time. My contact information is listed above.    This note was dictated using Dragon speech recognition software.     A full note including my physical examination and a full list of rectified medications is available in our medical record, and can be faxed as well.    Renny Lua MD  Cardiologist  [facility]

## 2017-04-10 NOTE — PROGRESS NOTES
Subjective:   Rosanna Damico is a 76-year-old woman with a history of ovarian cancer and hypertension with former PRES syndrome diagnosed at the time of a hypertensive episode referred for evaluation of palpitations.     She has no cardiovascular complaints, in much better control of her blood pressure at home.    She has recently been through chemotherapy, and continues to have periodically lower extremity edema.    She is having some leg pain in her right thigh that sounds to be neuropathic, but possibly related to degenerative disc disease. It temporally coincides with her chemotherapy.    Past Medical History   Diagnosis Date   • Other specified symptom associated with female genital organs      ovarian cancer   • Hypertension    • Other specified disorder of intestines    • Hiatus hernia syndrome    • Unspecified disorder of thyroid      hypothyroid   • Heart murmur    • OSTEOPOROSIS    • Hand fracture, right 2009   • PRES (posterior reversible encephalopathy syndrome) 11/2013   • Seizure (CMS-HCC) 11/2013     due to Posterior Reversable Encephalopathy syndrome, from cisplatin   • Renal disorder      hydronephrosis current 2016, elevated creatinine and BUN with Cisplatin 2012   • Cancer (CMS-HCC) 1/1/2011     ovarian     Past Surgical History   Procedure Laterality Date   • Gyn surgery  9/2012     major pelvic surgery   • Other abdominal surgery  9/2012     colon resection, omentectomy, lymph node dissection, IP port    • Other  2/07/2011     hysterectomy   • Other  1987     thyroid lobectomy   • Cath placement  11/8/2012     Performed by Mil Delcid M.D. at SURGERY [facility]   • Other orthopedic surgery       hand fracture   • Pelviscopy robotic  1/9/2014     Performed by Mil Delcid M.D. at SURGERY [facility]   • Debulking  1/9/2014     Performed by Mil Delcid M.D. at SURGERY [facility]   • Cath removal  1/9/2014     Performed by Mil Delcid M.D. at SURGERY [facility]   •  "Recovery  7/1/2015     Procedure: CT-CT Guided retroperitoneal lymph node biopsy-;  Surgeon: Recoveryonniki Surgery;  Location: SURGERY PRE-POST PROC UNIT [facility];  Service:    • Cystoscopy stent placement Right 2/5/2016     Procedure: CYSTOSCOPY STENT PLACEMENT;  Surgeon: Mil Delcid M.D.;  Location: SURGERY [facility];  Service:    • Retrogrades  2/5/2016     Procedure: RETROGRADES;  Surgeon: Mil Delcid M.D.;  Location: SURGERY [facility];  Service:    • Abdominal hysterectomy total  02/2011   • Cystoscopy stent placement Right 10/6/2016     Procedure: CYSTOSCOPY STENT PLACEMENT;  Surgeon: Mil eDlcid M.D.;  Location: SURGERY [facility];  Service:      Family History   Problem Relation Age of Onset   • Heart Disease Mother    • Cancer Father      Rectal and Lung   • Lung Disease Father    • Heart Disease Brother    • Psychiatry Brother      Schizophrenic     History   Smoking status   • Never Smoker    Smokeless tobacco   • Never Used     Allergies   Allergen Reactions   • Cisplatin Unspecified     \"sever kidney reaction when received IP chemo\"  CEU=0531   • Codeine Itching and Vomiting     RXN=>10 years ago     Outpatient Encounter Prescriptions as of 4/10/2017   Medication Sig Dispense Refill   • levothyroxine (SYNTHROID) 75 MCG Tab TAKE 1 TABLET BY MOUTH EVERY DAY 90 Tab 1   • B Complex-Folic Acid (B COMPLEX PLUS PO) Take  by mouth.     • amlodipine (NORVASC) 10 MG Tab Take 1 Tab by mouth every day. 90 Tab 3   • metoprolol (LOPRESSOR) 25 MG Tab Take 1 Tab by mouth 2 times a day. 180 Tab 3   • Cholecalciferol (VITAMIN D) 2000 UNITS Cap Take 1 Cap by mouth every day.     • calcium-vitamin D (OSCAL-250) 250-125 MG-UNIT TABS Take 1 Tab by mouth every day.     • Magnesium 500 MG TABS Take 1 Tab by mouth 2 Times a Day.     • fluconazole (DIFLUCAN) 200 MG Tab Take 200 mg by mouth every day.     • sulfamethoxazole-trimethoprim 200-40 mg/5 mL (BACTRIM,SEPTRA) 200-40 MG/5ML Suspension Take 8 mg/kg/day " "by mouth 2 times a day.     • dexamethasone (DECADRON) 4 MG Tab Take 2 mg by mouth 2 times a day.     • aspirin (ASA) 325 MG Tab Take 325 mg by mouth every 6 hours as needed.     • acetaminophen (TYLENOL) 325 MG Tab Take 650 mg by mouth every four hours as needed.     • ferrous gluconate (FERGON) 324 (38 FE) MG Tab Take 324 mg by mouth every morning with breakfast.     • ondansetron (ZOFRAN) 4 MG Tab tablet Take 4 mg by mouth every four hours as needed for Nausea/Vomiting.     • Prochlorperazine Maleate (COMPAZINE PO) Take  by mouth.       No facility-administered encounter medications on file as of 4/10/2017.     Review of Systems   Cardiovascular: Positive for leg swelling (related to known lymph node involvement of ovarian cancer). Negative for palpitations.        No palpitations since our last visit        Objective:   /54 mmHg  Pulse 64  Ht 1.626 m (5' 4.02\")  Wt 64.411 kg (142 lb)  BMI 24.36 kg/m2  SpO2 94%    Physical Exam   Constitutional: She is oriented to person, place, and time. She appears well-developed and well-nourished. No distress.   Pleasant, elderly woman in no distress   HENT:   Head: Normocephalic and atraumatic.   Eyes: Conjunctivae and EOM are normal. Pupils are equal, round, and reactive to light. No scleral icterus.   Neck: Neck supple. No JVD present. No tracheal deviation present.   Cardiovascular: Normal rate, regular rhythm, normal heart sounds and intact distal pulses.  Exam reveals no gallop and no friction rub.    No murmur heard.  Pulses:       Dorsalis pedis pulses are 2+ on the right side, and 2+ on the left side.   No carotid bruits   Pulmonary/Chest: Effort normal and breath sounds normal. No stridor. No respiratory distress. She has no wheezes. She has no rales.   Abdominal: Soft. Bowel sounds are normal. She exhibits no distension.   Musculoskeletal: She exhibits edema (1+ RLE, trace LLE edema).   Neurological: She is alert and oriented to person, place, and time. "   Skin: Skin is warm and dry. No rash noted. She is not diaphoretic. No erythema. No pallor.   Psychiatric: She has a normal mood and affect. Judgment and thought content normal.   Vitals reviewed.    Holter monitor and echocardiogram reviewed including images and summarized as above.    Assessment:     1. Essential hypertension     2. Palpitations         Medical Decision Making:  Today's Assessment / Status / Plan:     Medical Decision Making:    She is doing well from a cardiovascular perspective, and has no complaints today in that regard. Her blood pressure is 110/54 mmHg, and she has solidly palpitations. We did discuss what sound to be some side effects of her chemotherapy regimen and her lower extremity edema. She will continue with the physical therapist and working on her lymphedema, and has another round of chemotherapy upcoming. I have not changed her cardiovascular regimen nor ordered additional testing at this time.    Renny Lua MD  Cardiologist, [facility] [place]

## 2017-04-10 NOTE — PROGRESS NOTES
Name:          Rosanna Damico   YOB: 1940  Date:     4/10/2017      CHARLES Dale  [address]  [facility] [place] [facility] [address]     Renny Lua MD  [address], [facility] [address]  [place] [address]  Phone: [phone]  Back Line: [phone]  Fax: [phone]  E-mail: [email or web address]   Dear Dr. Johnson,    We had the pleasure of seeing your patient, Rosanna Damico, in Cardiology Clinic at [facility] and Vascular today.    As you know, she is a 76-year-old woman with a history of ovarian cancer and hypertension with former PRES syndrome diagnosed at the time of a hypertensive episode referred for evaluation of palpitations.    She is doing well from a cardiovascular perspective, and has no complaints today in that regard. Her blood pressure is 110/54 mmHg, and she has solidly palpitations. We did discuss what sound to be some side effects of her chemotherapy regimen and her lower extremity edema. She will continue with the physical therapist and working on her lymphedema, and has another round of chemotherapy upcoming. I have not changed her cardiovascular regimen nor ordered additional testing at this time.    We will have the patient follow-up in one year.    Thank you for the referral and please do not hesitate to contact me at any time. My contact information is listed above.    This note was dictated using Dragon speech recognition software.     A full note including my physical examination and a full list of rectified medications is available in our medical record, and can be faxed as well.    Renny Lua MD  Cardiologist  [facility]

## 2017-04-19 DIAGNOSIS — Z01.812 PRE-OPERATIVE LABORATORY EXAMINATION: ICD-10-CM

## 2017-04-19 DIAGNOSIS — Z01.810 PRE-OPERATIVE CARDIOVASCULAR EXAMINATION: ICD-10-CM

## 2017-04-19 LAB
APTT PPP: 28.8 SEC (ref 24.7–36)
EKG IMPRESSION: NORMAL
INR PPP: 0.92 (ref 0.87–1.13)
PROTHROMBIN TIME: 12.6 SEC (ref 12–14.6)

## 2017-04-19 PROCEDURE — 85610 PROTHROMBIN TIME: CPT

## 2017-04-19 PROCEDURE — 85730 THROMBOPLASTIN TIME PARTIAL: CPT

## 2017-04-19 PROCEDURE — 36415 COLL VENOUS BLD VENIPUNCTURE: CPT

## 2017-04-20 ENCOUNTER — APPOINTMENT (OUTPATIENT)
Dept: RADIOLOGY | Facility: MEDICAL CENTER | Age: 77
End: 2017-04-20
Attending: OBSTETRICS & GYNECOLOGY
Payer: MEDICARE

## 2017-04-20 ENCOUNTER — HOSPITAL ENCOUNTER (OUTPATIENT)
Facility: MEDICAL CENTER | Age: 77
End: 2017-04-20
Attending: SPECIALIST | Admitting: SPECIALIST
Payer: MEDICARE

## 2017-04-20 ENCOUNTER — APPOINTMENT (OUTPATIENT)
Dept: RADIOLOGY | Facility: MEDICAL CENTER | Age: 77
End: 2017-04-20
Attending: SPECIALIST
Payer: MEDICARE

## 2017-04-20 VITALS
BODY MASS INDEX: 25.71 KG/M2 | HEIGHT: 64 IN | TEMPERATURE: 97.2 F | RESPIRATION RATE: 18 BRPM | HEART RATE: 71 BPM | OXYGEN SATURATION: 96 % | WEIGHT: 150.57 LBS | SYSTOLIC BLOOD PRESSURE: 106 MMHG | DIASTOLIC BLOOD PRESSURE: 61 MMHG

## 2017-04-20 PROCEDURE — 160035 HCHG PACU - 1ST 60 MINS PHASE I: Performed by: SPECIALIST

## 2017-04-20 PROCEDURE — 500025 HCHG ARMREST, CRADLE FOAM: Performed by: SPECIALIST

## 2017-04-20 PROCEDURE — 500880 HCHG PACK, CYSTO W/SEP LEGGINGS: Performed by: SPECIALIST

## 2017-04-20 PROCEDURE — 110371 HCHG SHELL REV 272: Performed by: SPECIALIST

## 2017-04-20 PROCEDURE — 700101 HCHG RX REV CODE 250

## 2017-04-20 PROCEDURE — C2617 STENT, NON-COR, TEM W/O DEL: HCPCS | Performed by: SPECIALIST

## 2017-04-20 PROCEDURE — 160028 HCHG SURGERY MINUTES - 1ST 30 MINS LEVEL 3: Performed by: SPECIALIST

## 2017-04-20 PROCEDURE — 160048 HCHG OR STATISTICAL LEVEL 1-5: Performed by: SPECIALIST

## 2017-04-20 PROCEDURE — 700111 HCHG RX REV CODE 636 W/ 250 OVERRIDE (IP)

## 2017-04-20 PROCEDURE — 160002 HCHG RECOVERY MINUTES (STAT): Performed by: SPECIALIST

## 2017-04-20 PROCEDURE — 160039 HCHG SURGERY MINUTES - EA ADDL 1 MIN LEVEL 3: Performed by: SPECIALIST

## 2017-04-20 PROCEDURE — 160025 RECOVERY II MINUTES (STATS): Performed by: SPECIALIST

## 2017-04-20 PROCEDURE — 110382 HCHG SHELL REV 271: Performed by: SPECIALIST

## 2017-04-20 PROCEDURE — 160047 HCHG PACU  - EA ADDL 30 MINS PHASE II: Performed by: SPECIALIST

## 2017-04-20 PROCEDURE — 501330 HCHG SET, CYSTO IRRIG TUBING: Performed by: SPECIALIST

## 2017-04-20 PROCEDURE — A4606 OXYGEN PROBE USED W OXIMETER: HCPCS | Performed by: SPECIALIST

## 2017-04-20 PROCEDURE — 160036 HCHG PACU - EA ADDL 30 MINS PHASE I: Performed by: SPECIALIST

## 2017-04-20 PROCEDURE — 160009 HCHG ANES TIME/MIN: Performed by: SPECIALIST

## 2017-04-20 PROCEDURE — 160046 HCHG PACU - 1ST 60 MINS PHASE II: Performed by: SPECIALIST

## 2017-04-20 PROCEDURE — 74000 DX-ABDOMEN-1 VIEW: CPT

## 2017-04-20 PROCEDURE — 502240 HCHG MISC OR SUPPLY RC 0272: Performed by: SPECIALIST

## 2017-04-20 DEVICE — STENT UROLOGICAL POLARIS 6X22  ULTRA: Type: IMPLANTABLE DEVICE | Status: FUNCTIONAL

## 2017-04-20 RX ORDER — SODIUM CHLORIDE, SODIUM LACTATE, POTASSIUM CHLORIDE, CALCIUM CHLORIDE 600; 310; 30; 20 MG/100ML; MG/100ML; MG/100ML; MG/100ML
1000 INJECTION, SOLUTION INTRAVENOUS
Status: COMPLETED | OUTPATIENT
Start: 2017-04-20 | End: 2017-04-20

## 2017-04-20 RX ORDER — AMLODIPINE BESYLATE 10 MG/1
10 TABLET ORAL DAILY
COMMUNITY
End: 2017-06-05

## 2017-04-20 RX ORDER — LIDOCAINE HYDROCHLORIDE 10 MG/ML
INJECTION, SOLUTION INFILTRATION; PERINEURAL
Status: COMPLETED
Start: 2017-04-20 | End: 2017-04-20

## 2017-04-20 RX ORDER — OXYCODONE AND ACETAMINOPHEN 7.5; 325 MG/1; MG/1
1-2 TABLET ORAL EVERY 6 HOURS PRN
Status: DISCONTINUED | OUTPATIENT
Start: 2017-04-20 | End: 2017-04-20 | Stop reason: HOSPADM

## 2017-04-20 RX ORDER — ONDANSETRON 2 MG/ML
4 INJECTION INTRAMUSCULAR; INTRAVENOUS EVERY 6 HOURS PRN
Status: DISCONTINUED | OUTPATIENT
Start: 2017-04-20 | End: 2017-04-20 | Stop reason: HOSPADM

## 2017-04-20 RX ADMIN — LIDOCAINE HYDROCHLORIDE: 10 INJECTION, SOLUTION INFILTRATION; PERINEURAL at 08:20

## 2017-04-20 RX ADMIN — SODIUM CHLORIDE, SODIUM LACTATE, POTASSIUM CHLORIDE, CALCIUM CHLORIDE 1000 ML: 600; 310; 30; 20 INJECTION, SOLUTION INTRAVENOUS at 08:20

## 2017-04-20 ASSESSMENT — PAIN SCALES - GENERAL
PAINLEVEL_OUTOF10: 0
PAINLEVEL_OUTOF10: 1
PAINLEVEL_OUTOF10: 0
PAINLEVEL_OUTOF10: 0

## 2017-04-20 NOTE — OP REPORT
DATE OF SERVICE:  04/20/2017    PREOPERATIVE DIAGNOSES:  1.  History of ovarian cancer.  2.  History of right ureteral obstruction secondary to malignant obstruction   with the indwelling stent.  3.  Requiring ureteral right stent change.    POSTOPERATIVE DIAGNOSIS:   1.  History of ovarian cancer.  2.  History of right ureteral obstruction secondary to malignant obstruction   with the indwelling stent.  3.  Requiring ureteral right stent change.  4. Persistent ureteral obstruction secondary to   extrinsic compression from tumor.    PROCEDURES:  Cystoscopy with removal of the right ureteral stent and placement   of a new right ureteral stent 6x22.    SURGEON:  Mil Delcid MD    ASSISTANT:  Ray Villa M.D.    ANESTHESIA:  General endotracheal anesthesia.    ANESTHESIOLOGIST:  Rashid Sinclair MD    ESTIMATED BLOOD LOSS:  None.    FLUIDS:  Per Dr. Sinclair    URINE OUTPUT:  Not measured.    COMPLICATIONS:  None.    INDICATIONS FOR SURGERY:  The patient is a 76-year-old who has completed   salvage chemotherapy for recurrent ovarian cancer.  The patient had   unresectable periaortic lymph nodes along L2-L3 encompassing the right ureter   causing a hydronephrosis.  She underwent a ureteral stent change in October 2016 and has completed salvage therapy.  The patient presents now for a stent   change.    PROCEDURE IN DETAIL:  The patient was given IV antibiotics prior to procedure   and was taken to the operating room.  The patient was prepped and draped in   the usual sterile fashion, placed in the modified dorsal lithotomy position.    We placed a cystoscope transurethrally and grasped the stent with an alligator grasper and brought it through the urethral  orifice and at that point, we placed   a guidewire using fluoroscopy through the old stent and confirmed correct   placement via fluoroscopy.  Then, the stent was removed and a new 6x22 stent   was passed over the guidewire all the way up to the renal  pelvis.  We used   fluoroscopy to confirm correct placement of the double-J ureteral stent in the   renal pelvis and in the bladder.  Then, we placed a catheter underneath the   stent in the ureteral orifice and injected some contrast to confirm no   perforations.  Once position was confirmed, then the patient was reversed from   general anesthesia, and taken to the PACU in stable condition.  Sponge, lap,   needle, and instrument counts were correct x2.       ____________________________________     ARSH Ryan / NATHAN    DD:  04/20/2017 10:37:16  DT:  04/20/2017 11:13:57    D#:  [id number]  Job#:  [id number]

## 2017-04-20 NOTE — OR SURGEON
Immediate Post-Operative Note      PreOp Diagnosis: Pelvic mass    PostOp Diagnosis: same    Procedure(s):  CYSTOSCOPY STENT PLACEMENT FOR URETERAL STENT CHANGE    Surgeon(s):  Mil Delcid M.D.    Anesthesiologist/Type of Anesthesia:  Anesthesiologist: Rashid Sinclair M.D./* No anesthesia type entered *    Surgical Staff:  * No surgical staff found *    Specimen: uterus, right and left tubes and ovaries.     Estimated Blood Loss: 25    Findings: uterus had exophytic fibroids,right  3 cm ovarian mass, frozen section negative for malignancy, left ovary wnl. No evidence of malignancy seen throughout the abdominal cavity and pelvic mass.     Complications: none        4/20/2017 9:34 AM Ray Villa

## 2017-04-20 NOTE — PROGRESS NOTES
The Medication Reconciliation process has been completed by interviewing the patient    Allergies have been reviewed  Antibiotic use in 30 days - NONE    Home Pharmacy:  [facility]

## 2017-04-20 NOTE — DISCHARGE INSTRUCTIONS
ACTIVITY: Rest and take it easy for the first 24 hours.  A responsible adult is recommended to remain with you during that time.  It is normal to feel sleepy.  We encourage you to not do anything that requires balance, judgment or coordination.    MILD FLU-LIKE SYMPTOMS ARE NORMAL. YOU MAY EXPERIENCE GENERALIZED MUSCLE ACHES, THROAT IRRITATION, HEADACHE AND/OR SOME NAUSEA.    FOR 24 HOURS DO NOT:  Drive, operate machinery or run household appliances.  Drink beer or alcoholic beverages.   Make important decisions or sign legal documents.    SPECIAL INSTRUCTIONS:     1. No heavy lifting greater than 10 pounds for minimum 6 weeks  2. Nothing in vagina (ie no tampons, douching, intercourse) for minimum of 6 weeks  3. No driving while taking narcotics   4. Return to our office as directed and call to confirm appointment  Call our office [phone] if you develop any fevers, chills, nausea/vomiting, heavy vaginal bleeding, or redness, tenderness, and/or drainage from your wound, if you have persistent watery discharge while ambulating or stool draining from the vagina .  5. Showering is ok after shower make sure wound is dry.   6. You may keep the wound dressing and change everyday. After 2 weeks from surgery you may keep the wound dressing off.   7. You may have vaginal spotting or light bleeding which is normal.  8. If you have not had a bowel movement for 2 days, please take over the counter Milk of Magnesium, 1 tablespoon every 4 hours. After 4 doses and if you still have not had a bowel movement, please call your doctor.   9. You may eat soft diet, such as soup, liquid, for day #1 and if tolerating you may resume your regular diet.    DIET: To avoid nausea, slowly advance diet as tolerated, avoiding spicy or greasy foods for the first day.  Add more substantial food to your diet according to your physician's instructions.  INCREASE FLUIDS AND FIBER TO AVOID CONSTIPATION.    SURGICAL DRESSING/BATHING: see above  instructions    FOLLOW-UP APPOINTMENT:  A follow-up appointment should be arranged with your doctor; call to schedule.    You should CALL YOUR PHYSICIAN if you develop:  Fever greater than 101 degrees F.  Pain not relieved by medication, or persistent nausea or vomiting.  Excessive bleeding (blood soaking through dressing) or unexpected drainage from the wound.  Extreme redness or swelling around the incision site, drainage of pus or foul smelling drainage.  Inability to urinate or empty your bladder within 8 hours.  Problems with breathing or chest pain.    You should call 911 if you develop problems with breathing or chest pain.  If you are unable to contact your doctor or surgical center, you should go to the nearest emergency room or urgent care center.  Physician's telephone #: [phone]    If any questions arise, call your doctor.  If your doctor is not available, please feel free to call the Surgical Center at [phone].  The Center is open Monday through Friday from 7AM to 7PM.  You can also call the [employer] HOTLINE open 24 hours/day, 7 days/week and speak to a nurse at [phone], or toll free at [phone].    A registered nurse may call you a few days after your surgery to see how you are doing after your procedure.    MEDICATIONS: Resume taking daily medication.  Take prescribed pain medication with food.  If no medication is prescribed, you may take non-aspirin pain medication if needed.  PAIN MEDICATION CAN BE VERY CONSTIPATING.  Take a stool softener or laxative such as senokot, pericolace, or milk of magnesia if needed.    Prescription given none.  Last pain medication given  none.    If your physician has prescribed pain medication that includes Acetaminophen (Tylenol), do not take additional Acetaminophen (Tylenol) while taking the prescribed medication.    Depression / Suicide Risk    As you are discharged from this [facility] facility, it is important to learn how to keep safe  from harming yourself.    Recognize the warning signs:  · Abrupt changes in personality, positive or negative- including increase in energy   · Giving away possessions  · Change in eating patterns- significant weight changes-  positive or negative  · Change in sleeping patterns- unable to sleep or sleeping all the time   · Unwillingness or inability to communicate  · Depression  · Unusual sadness, discouragement and loneliness  · Talk of wanting to die  · Neglect of personal appearance   · Rebelliousness- reckless behavior  · Withdrawal from people/activities they love  · Confusion- inability to concentrate     If you or a loved one observes any of these behaviors or has concerns about self-harm, here's what you can do:  · Talk about it- your feelings and reasons for harming yourself  · Remove any means that you might use to hurt yourself (examples: pills, rope, extension cords, firearm)  · Get professional help from the community (Mental Health, Substance Abuse, psychological counseling)  · Do not be alone:Call your Safe Contact- someone whom you trust who will be there for you.  · Call your local CRISIS HOTLINE 573-1321 or 675-211-7923  · Call your local Children's Mobile Crisis Response Team [place] (151) 979-8011 or www.[employer]  · Call the toll free National Suicide Prevention Hotlines   · National Suicide Prevention Lifeline 103-777-KTIY (7743)  · National Hope Line Network 800-SUICIDE (511-6750)

## 2017-04-20 NOTE — IP AVS SNAPSHOT
4/20/2017    Rosanna Damico  [address]  [place] [address]    Dear Rosanna:    [facility] wants to ensure your discharge home is safe and you or your loved ones have had all of your questions answered regarding your care after you leave the hospital.    Below is a list of resources and contact information should you have any questions regarding your hospital stay, follow-up instructions, or active medical symptoms.    Questions or Concerns Regarding… Contact   Medical Questions Related to Your Discharge  (7 days a week, 8am-5pm) Contact a Nurse Care Coordinator   [phone]   Medical Questions Not Related to Your Discharge  (24 hours a day / 7 days a week)  Contact the Nurse Health Line   [phone]    Medications or Discharge Instructions Refer to your discharge packet   or contact your [facility] Primary Care Provider   [phone]   Follow-up Appointment(s) Schedule your appointment via [employer]   or contact Scheduling [phone]   Billing Review your statement via [employer]  or contact Billing [phone]   Medical Records Review your records via [employer]   or contact Medical Records [phone]     You may receive a telephone call within two days of discharge. This call is to make certain you understand your discharge instructions and have the opportunity to have any questions answered. You can also easily access your medical information, test results and upcoming appointments via the [employer] free online health management tool. You can learn more and sign up at [employer]. For assistance setting up your [employer] account, please call [phone].    Once again, we want to ensure your discharge home is safe and that you have a clear understanding of any next steps in your care. If you have any questions or concerns, please do not hesitate to contact us, we are here for you. Thank you for choosing [facility] for your healthcare needs.    Sincerely,    Your [facility] Healthcare Team

## 2017-04-20 NOTE — OR NURSING
1133: received to PACU 2 via gurney. Awake. Denies any pain.  1140: up ambulated to the bathroom with 1 person stand by assist. Voided with blood tinged urine. Patient got dressed.  1200: home care instructions given to patient and . Verbalizes understanding.  1220: meets criteria for discharge. Discharged via wheelchair to private car. Stable.

## 2017-04-20 NOTE — IP AVS SNAPSHOT
" Home Care Instructions                                                                                                                Name:Rosanna Damico  Medical Record Number:[id number]  CSN: [id number]    YOB: 1940   Age: 76 y.o.  Sex: female  HT:1.626 m (5' 4\") WT: 68.3 kg (150 lb 9.2 oz)          Admit Date: 4/20/2017     Discharge Date:   Today's Date: 4/20/2017  Attending Doctor:  Mil Delcid M.D.                  Allergies:  Cisplatin and Codeine                Discharge Instructions         ACTIVITY: Rest and take it easy for the first 24 hours.  A responsible adult is recommended to remain with you during that time.  It is normal to feel sleepy.  We encourage you to not do anything that requires balance, judgment or coordination.    MILD FLU-LIKE SYMPTOMS ARE NORMAL. YOU MAY EXPERIENCE GENERALIZED MUSCLE ACHES, THROAT IRRITATION, HEADACHE AND/OR SOME NAUSEA.    FOR 24 HOURS DO NOT:  Drive, operate machinery or run household appliances.  Drink beer or alcoholic beverages.   Make important decisions or sign legal documents.    SPECIAL INSTRUCTIONS:     1. No heavy lifting greater than 10 pounds for minimum 6 weeks  2. Nothing in vagina (ie no tampons, douching, intercourse) for minimum of 6 weeks  3. No driving while taking narcotics   4. Return to our office as directed and call to confirm appointment  Call our office [phone] if you develop any fevers, chills, nausea/vomiting, heavy vaginal bleeding, or redness, tenderness, and/or drainage from your wound, if you have persistent watery discharge while ambulating or stool draining from the vagina .  5. Showering is ok after shower make sure wound is dry.   6. You may keep the wound dressing and change everyday. After 2 weeks from surgery you may keep the wound dressing off.   7. You may have vaginal spotting or light bleeding which is normal.  8. If you have not had a bowel movement for 2 days, please take over the counter Milk of " Magnesium, 1 tablespoon every 4 hours. After 4 doses and if you still have not had a bowel movement, please call your doctor.   9. You may eat soft diet, such as soup, liquid, for day #1 and if tolerating you may resume your regular diet.    DIET: To avoid nausea, slowly advance diet as tolerated, avoiding spicy or greasy foods for the first day.  Add more substantial food to your diet according to your physician's instructions.  INCREASE FLUIDS AND FIBER TO AVOID CONSTIPATION.    SURGICAL DRESSING/BATHING: see above instructions    FOLLOW-UP APPOINTMENT:  A follow-up appointment should be arranged with your doctor; call to schedule.    You should CALL YOUR PHYSICIAN if you develop:  Fever greater than 101 degrees F.  Pain not relieved by medication, or persistent nausea or vomiting.  Excessive bleeding (blood soaking through dressing) or unexpected drainage from the wound.  Extreme redness or swelling around the incision site, drainage of pus or foul smelling drainage.  Inability to urinate or empty your bladder within 8 hours.  Problems with breathing or chest pain.    You should call 911 if you develop problems with breathing or chest pain.  If you are unable to contact your doctor or surgical center, you should go to the nearest emergency room or urgent care center.  Physician's telephone #: [phone]    If any questions arise, call your doctor.  If your doctor is not available, please feel free to call the Surgical Center at [phone].  The Center is open Monday through Friday from 7AM to 7PM.  You can also call the [employer] HOTLINE open 24 hours/day, 7 days/week and speak to a nurse at [phone], or toll free at [phone].    A registered nurse may call you a few days after your surgery to see how you are doing after your procedure.    MEDICATIONS: Resume taking daily medication.  Take prescribed pain medication with food.  If no medication is prescribed, you may take non-aspirin pain medication  if needed.  PAIN MEDICATION CAN BE VERY CONSTIPATING.  Take a stool softener or laxative such as senokot, pericolace, or milk of magnesia if needed.    Prescription given none.  Last pain medication given  none.    If your physician has prescribed pain medication that includes Acetaminophen (Tylenol), do not take additional Acetaminophen (Tylenol) while taking the prescribed medication.    Depression / Suicide Risk    As you are discharged from this [facility] facility, it is important to learn how to keep safe from harming yourself.    Recognize the warning signs:  · Abrupt changes in personality, positive or negative- including increase in energy   · Giving away possessions  · Change in eating patterns- significant weight changes-  positive or negative  · Change in sleeping patterns- unable to sleep or sleeping all the time   · Unwillingness or inability to communicate  · Depression  · Unusual sadness, discouragement and loneliness  · Talk of wanting to die  · Neglect of personal appearance   · Rebelliousness- reckless behavior  · Withdrawal from people/activities they love  · Confusion- inability to concentrate     If you or a loved one observes any of these behaviors or has concerns about self-harm, here's what you can do:  · Talk about it- your feelings and reasons for harming yourself  · Remove any means that you might use to hurt yourself (examples: pills, rope, extension cords, firearm)  · Get professional help from the community (Mental Health, Substance Abuse, psychological counseling)  · Do not be alone:Call your Safe Contact- someone whom you trust who will be there for you.  · Call your local CRISIS HOTLINE 220-7794 or 910-097-9991  · Call your local Children's Mobile Crisis Response Team [place] (004) 146-6728 or www.[employer]  · Call the toll free National Suicide Prevention Hotlines   · National Suicide Prevention Lifeline 733-318-JETP (2702)  · National Hope Line Network 800-SUICIDE  (581-9638)       Medication List      CONTINUE taking these medications        Instructions    Morning Afternoon Evening Bedtime    amlodipine 10 MG Tabs   Commonly known as:  NORVASC        Take 10 mg by mouth every day.   Dose:  10 mg                        B COMPLEX PLUS PO        Take 1 Tab by mouth every day.   Dose:  1 Tab                        calcium-vitamin D 250-125 MG-UNIT Tabs   Commonly known as:  OSCAL-250        Take 1 Tab by mouth every day.   Dose:  1 Tab                        DEXAMETHASONE (TIMBO) PO        Take  by mouth. Indications: for chemo treatment, unsure if 2 or 4 mg                        levothyroxine 75 MCG Tabs   Commonly known as:  SYNTHROID        TAKE 1 TABLET BY MOUTH EVERY DAY                        Magnesium 500 MG Tabs        Take 1 Tab by mouth 2 Times a Day.   Dose:  1 Tab                        metoprolol 25 MG Tabs   Commonly known as:  LOPRESSOR        Take 1 Tab by mouth 2 times a day.   Dose:  25 mg                        Vitamin D 2000 UNITS Caps        Take 1 Cap by mouth every day.   Dose:  1 Cap                                Medication Information     Above and/or attached are the medications your physician expects you to take upon discharge. Review all of your home medications and newly ordered medications with your doctor and/or pharmacist. Follow medication instructions as directed by your doctor and/or pharmacist. Please keep your medication list with you and share with your physician. Update the information when medications are discontinued, doses are changed, or new medications (including over-the-counter products) are added; and carry medication information at all times in the event of emergency situations.        Resources     Quit Smoking / Tobacco Use:    I understand the use of any tobacco products increases my chance of suffering from future heart disease or stroke and could cause other illnesses which may shorten my life. Quitting the use of tobacco  products is the single most important thing I can do to improve my health. For further information on smoking / tobacco cessation call a Toll Free Quit Line at 1-707.400.9935 (*[employer] [place]) or 1-201.844.9033 (American Lung Association) or you can access the web based program at www.lungusa.org.    [place] Tobacco Users Help Line:  (658) 416-6139       Toll Free: 1-781.384.4092  Quit Tobacco Program [facility] (523)072-2910    Crisis Hotline:    [place] Crisis Hotline:  9-500-YBLMNTJ or 1-273.260.6808    [place] Crisis Hotline:    1-462.918.3143 or 303-166-1874    Discharge Survey:   [facility] We hope we did everything we could to make your hospital stay a pleasant one. You may be receiving a survey and we would appreciate your time and participation in answering the questions. Your input is very valuable to us in our efforts to improve our service to our patients and their families.            Signatures     My signature on this form indicates that:    1. I acknowledge receipt and understanding of these Home Care Instruction.  2. My questions regarding this information have been answered to my satisfaction.  3. I have formulated a plan with my discharge nurse to obtain my prescribed medications for home.    __________________________________      __________________________________                   Patient Signature                                 Guardian/Responsible Adult Signature      __________________________________                 __________       ________                       Nurse Signature                                               Date                 Time

## 2017-04-20 NOTE — OR SURGEON
Immediate Post-Operative Note      PreOp Diagnosis: 1. 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                      2. S/p Salvage chemotherapy for recurrent ovarian cancer.         PostOp Diagnosis: Same    Procedure(s):  CYSTOSCOPY STENT PLACEMENT FOR URETERAL STENT CHANGE - Wound Class: Clean Contaminated    Surgeon(s):  Mil Delcid M.D.    Anesthesiologist/Type of Anesthesia:  Anesthesiologist: Rashid Sinclair M.D./General    Surgical Staff:  Circulator: Lisbet Valdivia R.N.  Scrub Person: Socorro Rabago    Specimen: none     Estimated Blood Loss: minimal    Findings: stent in right ureter position confirmed with fluroscopy.     Complications: none        4/20/2017 10:22 AM Ray Villa

## 2017-05-02 ENCOUNTER — HOSPITAL ENCOUNTER (OUTPATIENT)
Dept: RADIOLOGY | Facility: MEDICAL CENTER | Age: 77
End: 2017-05-02
Attending: SPECIALIST
Payer: MEDICARE

## 2017-05-02 DIAGNOSIS — C56.9 MALIGNANT NEOPLASM OF OVARY, UNSPECIFIED LATERALITY (HCC): ICD-10-CM

## 2017-05-02 PROCEDURE — 700117 HCHG RX CONTRAST REV CODE 255: Performed by: SPECIALIST

## 2017-05-02 PROCEDURE — 74177 CT ABD & PELVIS W/CONTRAST: CPT

## 2017-05-02 RX ADMIN — IOHEXOL 100 ML: 350 INJECTION, SOLUTION INTRAVENOUS at 09:15

## 2017-06-05 DIAGNOSIS — I10 ESSENTIAL HYPERTENSION: ICD-10-CM

## 2017-06-05 RX ORDER — AMLODIPINE BESYLATE 10 MG/1
10 TABLET ORAL DAILY
Qty: 90 TAB | Refills: 3 | OUTPATIENT
Start: 2017-06-05 | End: 2018-01-01

## 2017-06-22 ENCOUNTER — HOSPITAL ENCOUNTER (OUTPATIENT)
Dept: RADIOLOGY | Facility: MEDICAL CENTER | Age: 77
End: 2017-06-22

## 2017-06-29 ENCOUNTER — HOSPITAL ENCOUNTER (OUTPATIENT)
Dept: RADIOLOGY | Facility: MEDICAL CENTER | Age: 77
End: 2017-06-29
Attending: SPECIALIST
Payer: MEDICARE

## 2017-06-29 DIAGNOSIS — C56.9 MALIGNANT NEOPLASM OF OVARY, UNSPECIFIED LATERALITY (HCC): ICD-10-CM

## 2017-06-29 PROCEDURE — 700117 HCHG RX CONTRAST REV CODE 255: Performed by: SPECIALIST

## 2017-06-29 PROCEDURE — 71260 CT THORAX DX C+: CPT

## 2017-06-29 PROCEDURE — 700111 HCHG RX REV CODE 636 W/ 250 OVERRIDE (IP)

## 2017-06-29 RX ADMIN — HEPARIN 500 UNITS: 100 SYRINGE at 13:00

## 2017-06-29 RX ADMIN — IOHEXOL 100 ML: 350 INJECTION, SOLUTION INTRAVENOUS at 12:45

## 2017-06-29 NOTE — PROGRESS NOTES
Port accessed per p&p, good blood return, flushed easily with NS.  1300:  Port de-accessed per p&p, good blood return, flushed easily with 20cc's NS, and heparin per protocol; sterile dressing to site, no bleeding noted.

## 2017-08-04 ENCOUNTER — HOSPITAL ENCOUNTER (OUTPATIENT)
Dept: RADIOLOGY | Facility: MEDICAL CENTER | Age: 77
End: 2017-08-04
Attending: SPECIALIST
Payer: MEDICARE

## 2017-08-04 DIAGNOSIS — C56.9 MALIGNANT NEOPLASM OF OVARY, UNSPECIFIED LATERALITY (HCC): ICD-10-CM

## 2017-08-04 PROCEDURE — 74177 CT ABD & PELVIS W/CONTRAST: CPT

## 2017-08-04 PROCEDURE — 700111 HCHG RX REV CODE 636 W/ 250 OVERRIDE (IP): Performed by: RADIOLOGY

## 2017-08-04 RX ADMIN — HEPARIN 500 UNITS: 100 SYRINGE at 11:25

## 2017-08-07 ENCOUNTER — TELEPHONE (OUTPATIENT)
Dept: CARDIOLOGY | Facility: MEDICAL CENTER | Age: 77
End: 2017-08-07

## 2017-08-07 DIAGNOSIS — I10 ESSENTIAL HYPERTENSION, MALIGNANT: ICD-10-CM

## 2017-08-07 NOTE — TELEPHONE ENCOUNTER
returning your call  Received: Today       DELMAR Carroll/Tianna       Patient returning your call (patient stated she was calling for a refill on her Metoprolol, please disregard first message. Task for refill request sent to [facility].) She can be reached at [phone].       Rx for Metoprolol 25 mg BID called in to [facility]. Called patient and advised her of the same.    JANNY CHAPMAN

## 2017-08-07 NOTE — TELEPHONE ENCOUNTER
----- Message from Natali Smith sent at 8/7/2017  8:37 AM PDT -----  Regarding: question about Sept CT test  PB/Dasia      Patient had CT angio in June, and she wants to know why she is scheduled for another one in September. She can be reached at [phone].

## 2017-08-07 NOTE — TELEPHONE ENCOUNTER
Called [phone], the person that answered the phone states that Rosanna is not at that number.    Called the patient's phone # on file and left a voicemail with instructions to call the office regarding her question (no recent CT orders from Dr. Lua seen in EPIC).    JANNY CHAPMAN

## 2017-08-29 RX ORDER — ONDANSETRON 2 MG/ML
4 INJECTION INTRAMUSCULAR; INTRAVENOUS
Status: CANCELLED | OUTPATIENT
Start: 2017-09-13

## 2017-08-29 RX ORDER — ONDANSETRON 2 MG/ML
4 INJECTION INTRAMUSCULAR; INTRAVENOUS
Status: CANCELLED | OUTPATIENT
Start: 2017-09-20

## 2017-08-29 RX ORDER — ONDANSETRON 8 MG/1
8 TABLET, ORALLY DISINTEGRATING ORAL
Status: CANCELLED | OUTPATIENT
Start: 2017-09-13

## 2017-08-29 RX ORDER — LIDOCAINE HYDROCHLORIDE 10 MG/ML
0.5 INJECTION, SOLUTION INFILTRATION; PERINEURAL SEE ADMIN INSTRUCTIONS
Status: CANCELLED | OUTPATIENT
Start: 2017-09-20

## 2017-08-29 RX ORDER — SODIUM CHLORIDE 9 MG/ML
INJECTION, SOLUTION INTRAVENOUS CONTINUOUS
Status: CANCELLED | OUTPATIENT
Start: 2017-09-20

## 2017-08-29 RX ORDER — LIDOCAINE HYDROCHLORIDE 10 MG/ML
0.5 INJECTION, SOLUTION INFILTRATION; PERINEURAL SEE ADMIN INSTRUCTIONS
Status: CANCELLED | OUTPATIENT
Start: 2017-09-13

## 2017-08-29 RX ORDER — ONDANSETRON 8 MG/1
8 TABLET, ORALLY DISINTEGRATING ORAL
Status: CANCELLED | OUTPATIENT
Start: 2017-09-20

## 2017-08-29 RX ORDER — PROCHLORPERAZINE MALEATE 10 MG
10 TABLET ORAL EVERY 6 HOURS PRN
Status: CANCELLED | OUTPATIENT
Start: 2017-09-20

## 2017-08-29 RX ORDER — SODIUM CHLORIDE 9 MG/ML
INJECTION, SOLUTION INTRAVENOUS CONTINUOUS
Status: CANCELLED | OUTPATIENT
Start: 2017-09-06

## 2017-08-29 RX ORDER — PROCHLORPERAZINE MALEATE 10 MG
10 TABLET ORAL EVERY 6 HOURS PRN
Status: CANCELLED | OUTPATIENT
Start: 2017-09-13

## 2017-09-06 ENCOUNTER — OUTPATIENT INFUSION SERVICES (OUTPATIENT)
Dept: ONCOLOGY | Facility: MEDICAL CENTER | Age: 77
End: 2017-09-06
Attending: SPECIALIST
Payer: MEDICARE

## 2017-09-06 VITALS
SYSTOLIC BLOOD PRESSURE: 129 MMHG | HEIGHT: 64 IN | TEMPERATURE: 97.3 F | HEART RATE: 64 BPM | BODY MASS INDEX: 24.73 KG/M2 | RESPIRATION RATE: 18 BRPM | DIASTOLIC BLOOD PRESSURE: 42 MMHG | WEIGHT: 144.84 LBS | OXYGEN SATURATION: 99 %

## 2017-09-06 DIAGNOSIS — C56.9 MALIGNANT NEOPLASM OF OVARY, UNSPECIFIED LATERALITY (HCC): ICD-10-CM

## 2017-09-06 DIAGNOSIS — Z85.43 HISTORY OF OVARIAN CANCER: ICD-10-CM

## 2017-09-06 DIAGNOSIS — Z51.11 ENCOUNTER FOR ANTINEOPLASTIC CHEMOTHERAPY: ICD-10-CM

## 2017-09-06 LAB
ALBUMIN SERPL BCP-MCNC: 3.8 G/DL (ref 3.2–4.9)
ALBUMIN/GLOB SERPL: 1.2 G/DL
ALP SERPL-CCNC: 71 U/L (ref 30–99)
ALT SERPL-CCNC: 12 U/L (ref 2–50)
ANION GAP SERPL CALC-SCNC: 7 MMOL/L (ref 0–11.9)
APPEARANCE UR: ABNORMAL
AST SERPL-CCNC: 22 U/L (ref 12–45)
BACTERIA #/AREA URNS HPF: ABNORMAL /HPF
BASOPHILS # BLD AUTO: 0.5 % (ref 0–1.8)
BASOPHILS # BLD: 0.04 K/UL (ref 0–0.12)
BILIRUB SERPL-MCNC: 0.3 MG/DL (ref 0.1–1.5)
BILIRUB UR QL STRIP.AUTO: NEGATIVE
BUN SERPL-MCNC: 24 MG/DL (ref 8–22)
CALCIUM SERPL-MCNC: 9.5 MG/DL (ref 8.5–10.5)
CANCER AG125 SERPL-ACNC: 433.2 U/ML (ref 0–35)
CHLORIDE SERPL-SCNC: 101 MMOL/L (ref 96–112)
CO2 SERPL-SCNC: 25 MMOL/L (ref 20–33)
COLOR UR: YELLOW
CREAT SERPL-MCNC: 1.3 MG/DL (ref 0.5–1.4)
CREAT UR-MCNC: 43.1 MG/DL
EOSINOPHIL # BLD AUTO: 0.22 K/UL (ref 0–0.51)
EOSINOPHIL NFR BLD: 2.8 % (ref 0–6.9)
EPI CELLS #/AREA URNS HPF: NEGATIVE /HPF
ERYTHROCYTE [DISTWIDTH] IN BLOOD BY AUTOMATED COUNT: 48.1 FL (ref 35.9–50)
GFR SERPL CREATININE-BSD FRML MDRD: 40 ML/MIN/1.73 M 2
GLOBULIN SER CALC-MCNC: 3.2 G/DL (ref 1.9–3.5)
GLUCOSE SERPL-MCNC: 99 MG/DL (ref 65–99)
GLUCOSE UR STRIP.AUTO-MCNC: NEGATIVE MG/DL
HCT VFR BLD AUTO: 36.5 % (ref 37–47)
HGB BLD-MCNC: 11.7 G/DL (ref 12–16)
HYALINE CASTS #/AREA URNS LPF: ABNORMAL /LPF
IMM GRANULOCYTES # BLD AUTO: 0.03 K/UL (ref 0–0.11)
IMM GRANULOCYTES NFR BLD AUTO: 0.4 % (ref 0–0.9)
KETONES UR STRIP.AUTO-MCNC: NEGATIVE MG/DL
LEUKOCYTE ESTERASE UR QL STRIP.AUTO: ABNORMAL
LYMPHOCYTES # BLD AUTO: 1.43 K/UL (ref 1–4.8)
LYMPHOCYTES NFR BLD: 18.3 % (ref 22–41)
MAGNESIUM SERPL-MCNC: 1.9 MG/DL (ref 1.5–2.5)
MCH RBC QN AUTO: 26.3 PG (ref 27–33)
MCHC RBC AUTO-ENTMCNC: 32.1 G/DL (ref 33.6–35)
MCV RBC AUTO: 82 FL (ref 81.4–97.8)
MICRO URNS: ABNORMAL
MONOCYTES # BLD AUTO: 0.82 K/UL (ref 0–0.85)
MONOCYTES NFR BLD AUTO: 10.5 % (ref 0–13.4)
NEUTROPHILS # BLD AUTO: 5.27 K/UL (ref 2–7.15)
NEUTROPHILS NFR BLD: 67.5 % (ref 44–72)
NITRITE UR QL STRIP.AUTO: NEGATIVE
NRBC # BLD AUTO: 0 K/UL
NRBC BLD AUTO-RTO: 0 /100 WBC
PH UR STRIP.AUTO: 6.5 [PH]
PLATELET # BLD AUTO: 313 K/UL (ref 164–446)
PMV BLD AUTO: 9.1 FL (ref 9–12.9)
POTASSIUM SERPL-SCNC: 4.3 MMOL/L (ref 3.6–5.5)
PROT SERPL-MCNC: 7 G/DL (ref 6–8.2)
PROT UR QL STRIP: 100 MG/DL
PROT UR-MCNC: 78.9 MG/DL (ref 0–15)
PROT/CREAT UR: 1831 MG/G (ref 10–107)
RBC # BLD AUTO: 4.45 M/UL (ref 4.2–5.4)
RBC # URNS HPF: ABNORMAL /HPF
RBC UR QL AUTO: ABNORMAL
SODIUM SERPL-SCNC: 133 MMOL/L (ref 135–145)
SP GR UR STRIP.AUTO: 1.01
UROBILINOGEN UR STRIP.AUTO-MCNC: 0.2 MG/DL
WBC # BLD AUTO: 7.8 K/UL (ref 4.8–10.8)
WBC #/AREA URNS HPF: ABNORMAL /HPF

## 2017-09-06 PROCEDURE — 306780 HCHG STAT FOR TRANSFUSION PER CASE

## 2017-09-06 PROCEDURE — 96401 CHEMO ANTI-NEOPL SQ/IM: CPT

## 2017-09-06 PROCEDURE — 700111 HCHG RX REV CODE 636 W/ 250 OVERRIDE (IP)

## 2017-09-06 PROCEDURE — A4212 NON CORING NEEDLE OR STYLET: HCPCS

## 2017-09-06 PROCEDURE — 86304 IMMUNOASSAY TUMOR CA 125: CPT

## 2017-09-06 PROCEDURE — 82570 ASSAY OF URINE CREATININE: CPT

## 2017-09-06 PROCEDURE — 700105 HCHG RX REV CODE 258: Performed by: SPECIALIST

## 2017-09-06 PROCEDURE — 83735 ASSAY OF MAGNESIUM: CPT

## 2017-09-06 PROCEDURE — 700111 HCHG RX REV CODE 636 W/ 250 OVERRIDE (IP): Performed by: OBSTETRICS & GYNECOLOGY

## 2017-09-06 PROCEDURE — 81001 URINALYSIS AUTO W/SCOPE: CPT

## 2017-09-06 PROCEDURE — 85025 COMPLETE CBC W/AUTO DIFF WBC: CPT

## 2017-09-06 PROCEDURE — 80053 COMPREHEN METABOLIC PANEL: CPT

## 2017-09-06 PROCEDURE — 84156 ASSAY OF PROTEIN URINE: CPT

## 2017-09-06 RX ORDER — SODIUM CHLORIDE 9 MG/ML
INJECTION, SOLUTION INTRAVENOUS CONTINUOUS
Status: DISCONTINUED | OUTPATIENT
Start: 2017-09-06 | End: 2017-09-06 | Stop reason: HOSPADM

## 2017-09-06 RX ORDER — SODIUM CHLORIDE 9 MG/ML
INJECTION, SOLUTION INTRAVENOUS CONTINUOUS
Status: CANCELLED | OUTPATIENT
Start: 2017-09-13

## 2017-09-06 RX ADMIN — CARBOPLATIN 0.4 MG: 10 INJECTION, SOLUTION INTRAVENOUS at 13:14

## 2017-09-06 RX ADMIN — SODIUM CHLORIDE: 9 INJECTION, SOLUTION INTRAVENOUS at 10:42

## 2017-09-06 RX ADMIN — HEPARIN 500 UNITS: 100 SYRINGE at 14:12

## 2017-09-06 ASSESSMENT — PAIN SCALES - GENERAL: PAINLEVEL: 4=SLIGHT-MODERATE PAIN

## 2017-09-06 NOTE — PROGRESS NOTES
"Pharmacy chemotherapy verification:  Carboplatin skin test ONLY today.    Patient Name: Rosanna Damico  Dx: recurrent ovarian cancer          Protocol: carboplatin/gemcitabine + bevacizumab     *Dosing Reference*  · Carboplatin (Paraplatin) AUC 4 IV once on day 1  · Gemcitabine (Gemzar) 1000 mg/m2 IV once per day on days 1 & 8  · Bevacizumab (Avastin) 15 mg/kg IV once on day 1, given first  21-day cycle for 6 to 10 cycles, based on response; after completing these cycles of carboplatin, gemcitabine, and bevacizumab, patients continue on bevacizumab maintenance until progression of disease or unacceptable toxicity   marilu Jean-Baptiste- TITA-  J Clin Oncol. 2012 Clif 10; 30(17): 3856-8904.     Allergies:  Cisplatin and Codeine     /42   Pulse 64   Temp 36.3 °C (97.3 °F)   Resp 18   Ht 1.626 m (5' 4\")   Wt 65.7 kg (144 lb 13.5 oz)   SpO2 99%   BMI 24.86 kg/m²  Body surface area is 1.72 meters squared.    ANC~ 5270 Plt = 313k   Hgb = 11.7     SCr = 1.3mg/dL CrCl ~ 39mL/min   LFT's = WNl TBili = 0.3     Ref. Range 9/6/2017 09:20   Protein Creatinine Ratio Latest Ref Range: 10 - 107 mg/g 1831 (H)          Drug Order   (Drug name, dose, route, IV Fluid & volume, frequency, number of doses) Cycle: carboplatin skin test     Previous treatment: 4/4/17- taxol x 6 cycles             <5% difference, ok to treat with final dose             <5% difference, ok to treat with final dose             <5% difference, ok to treat with final dose       Medication = carboplatin skin test  Base Dose= 0.4mg fixed dose  Calc Dose: n/a  Final Dose = 0.4mg  Route = Intradermal inj  Fluid & Volume = 0.04mL undilute drug  Admin Duration = Intradermal   Tolerated without incident       No calculation required         By my signature below, I confirm this process was performed independently with the BSA and all final chemotherapy dosing calculations congruent. I have reviewed the above chemotherapy order and that my calculation " of the final dose and BSA (when applicable) corroborate those calculations of the  pharmacist. Discrepancies of 5% or greater in the written dose have been addressed and documented within the EPIC Progress notes.    Signature: Alma Perales.D.

## 2017-09-06 NOTE — PROGRESS NOTES
Telephone call placed to Mayra CHAPMAN with Dr. Delcid.  Mayra CHAPMAN notified of negative carboplatin skin sensitivity test today and pt request to defer remainder of treatment to next week.  ADELA Nunez tells me she had provided patient with lab slips which will need to be completed prior to or at time of next visit.  ADELA Nunez tells me she will f/u with patient regarding labs.

## 2017-09-06 NOTE — PROGRESS NOTES
Pt to infusion services ambulatory per self.  Pt here for scheduled chemotherapy, day 1, cycle 1 of Avastin +Gemzar + Carboplatin.  Plan of care reviewed.  Pt verbalizes understanding.  Pt with right chest port.  Port site cleansed and port accessed in sterile fashion.  Port flushes easily; + blood return verified.  Labs collected from port per MD orders.  Urine sample collected for UA and Urine Protein/Creatinine ratio.  Lab specimens sent to lab.  Pt resting in chair.  Pt tells me that MD discussed having carboplatin sensitivity test done, however this has not yet been done.  Recent labs done 08/28/17 show urine protein >300, however labs repeated today and awaiting results.  Telephone call to MD placed to clarify orders.  Awaiting return call.

## 2017-09-06 NOTE — PROGRESS NOTES
Chemotherapy Verification - SECONDARY RN       Height = 64in  Weight = 65.7kg  BSA = 1.72       Medication: Carboplatin   Dose: 0.4mg  Calculated Dose: 0.4mg TEST DOSE                             (In mg/m2, AUC, mg/kg)       I confirm that this process was performed independently.

## 2017-09-06 NOTE — PROGRESS NOTES
"Pharmacy Chemotherapy Verification    Patient Name: Rosanna Damico      Dx: Recurrent Ovarian Cancer    Cycle:   Previous treatment: C6 Taxol = 4/4/17    **Carboplatin skin test only today**    Protocol: Carboplatin + Gemzar + Avastin  Bevacizumab 15 mg/kg IV on Day 1   Gemcitabine 1000 mg/ IV on Days 1 and 8  Carboplatin AUC 4 IV on Day 1   Every 21 days for 6 cycles  Rayne SANTIAGO et al. OCEANS: A Randomized, Double-Blind, Placebo-Controlled Phase III Trial of Chemotherapy With or Without Bevacizumab in Patients With Platinum-Sensitive Recurrent Epithelial Ovarian, Primary Peritoneal, or Fallopian Tube Cancer. J Clin Oncol. 2012 Clif 10; 30(17): 1434-4682.          Allergies: Cisplatin and Codeine  /42   Pulse 64   Temp 36.3 °C (97.3 °F)   Resp 18   Ht 1.626 m (5' 4\")   Wt 65.7 kg (144 lb 13.5 oz)   SpO2 99%   BMI 24.86 kg/m²    Body surface area is 1.72 meters squared.     Labs 9/5/17  ANC ~5270          Plt = 313k            Hgb = 11.7                  SCr = 1.3 mg/dL                     CrCl ~ 39mL/min   LFT's = WNL              TBili = 0.3     Urine protein = 100    Carboplatin skin test: 0.4mg fixed dose   okay to treat with final dose = 0.4 mg intradermal        Dougie Thomas, PharmD           "

## 2017-09-06 NOTE — PROGRESS NOTES
Carbo skin test, (RFA) site observation.  No reaction noted at 1325.    Carbo skin test, (RFA) site observation.  No reaction noted at 1340.

## 2017-09-06 NOTE — PROGRESS NOTES
Late entry for 1319:  Carbo skin test administered per MD orders to RFA via intradermal injection.  Skin test placement site marked.  Report given to ADELA Davis for lunch coverage.      Carbo skin test, (RFA) site observation.  No reaction observed at 1350.      Pt requesting to leave at this time and not complete remainder of treatment today.  Discussed with nurse manager ADELA Augustin.  Okay to defer treatment today.  Pt scheduled again next Tuesday for day 1.  Port flushed with normal saline and heparin per policy.  Port de-accessed; needle intact.  Pressure dressing applied.  Pt released to self care and care of spouse in no apparent distress after completion of treatment, ambulatory.

## 2017-09-06 NOTE — PROGRESS NOTES
Chemotherapy Verification - PRIMARY RN      Height = 64 inches  Weight = 65.7 kg  BSA = 1.72 m2       Medication: Carboplatin  Dose: 0.4 mg  Calculated Dose: 0.4 mg, set dose - no calculation required -                                                                                                                            Carboplatin Sensitivity Test                           (In mg/m2, AUC, mg/kg)       I confirm this process was performed independently with the BSA and all final chemotherapy dosing calculations congruent.  Any discrepancies of 5% or greater have been addressed with the chemotherapy pharmacist. The resolution of the discrepancy has been documented in the EPIC progress notes.

## 2017-09-12 ENCOUNTER — OUTPATIENT INFUSION SERVICES (OUTPATIENT)
Dept: ONCOLOGY | Facility: MEDICAL CENTER | Age: 77
End: 2017-09-12
Attending: SPECIALIST
Payer: MEDICARE

## 2017-09-12 VITALS
OXYGEN SATURATION: 98 % | TEMPERATURE: 97.8 F | HEART RATE: 79 BPM | RESPIRATION RATE: 18 BRPM | WEIGHT: 146.16 LBS | SYSTOLIC BLOOD PRESSURE: 145 MMHG | DIASTOLIC BLOOD PRESSURE: 60 MMHG | HEIGHT: 64 IN | BODY MASS INDEX: 24.95 KG/M2

## 2017-09-12 DIAGNOSIS — C56.1 OVARIAN CANCER ON RIGHT (HCC): ICD-10-CM

## 2017-09-12 LAB
ALBUMIN SERPL BCP-MCNC: 4 G/DL (ref 3.2–4.9)
ALBUMIN/GLOB SERPL: 1.3 G/DL
ALP SERPL-CCNC: 75 U/L (ref 30–99)
ALT SERPL-CCNC: 13 U/L (ref 2–50)
ANION GAP SERPL CALC-SCNC: 10 MMOL/L (ref 0–11.9)
AST SERPL-CCNC: 26 U/L (ref 12–45)
BASOPHILS # BLD AUTO: 0.5 % (ref 0–1.8)
BASOPHILS # BLD: 0.04 K/UL (ref 0–0.12)
BILIRUB SERPL-MCNC: 0.4 MG/DL (ref 0.1–1.5)
BUN SERPL-MCNC: 22 MG/DL (ref 8–22)
CALCIUM SERPL-MCNC: 9.6 MG/DL (ref 8.5–10.5)
CHLORIDE SERPL-SCNC: 99 MMOL/L (ref 96–112)
CO2 SERPL-SCNC: 26 MMOL/L (ref 20–33)
CREAT SERPL-MCNC: 1.2 MG/DL (ref 0.5–1.4)
CREAT UR-MCNC: 35.8 MG/DL
EOSINOPHIL # BLD AUTO: 0.21 K/UL (ref 0–0.51)
EOSINOPHIL NFR BLD: 2.8 % (ref 0–6.9)
ERYTHROCYTE [DISTWIDTH] IN BLOOD BY AUTOMATED COUNT: 46.1 FL (ref 35.9–50)
GFR SERPL CREATININE-BSD FRML MDRD: 44 ML/MIN/1.73 M 2
GLOBULIN SER CALC-MCNC: 3.1 G/DL (ref 1.9–3.5)
GLUCOSE SERPL-MCNC: 94 MG/DL (ref 65–99)
HCT VFR BLD AUTO: 37.1 % (ref 37–47)
HGB BLD-MCNC: 12 G/DL (ref 12–16)
IMM GRANULOCYTES # BLD AUTO: 0.03 K/UL (ref 0–0.11)
IMM GRANULOCYTES NFR BLD AUTO: 0.4 % (ref 0–0.9)
LYMPHOCYTES # BLD AUTO: 2.24 K/UL (ref 1–4.8)
LYMPHOCYTES NFR BLD: 29.9 % (ref 22–41)
MAGNESIUM SERPL-MCNC: 1.8 MG/DL (ref 1.5–2.5)
MCH RBC QN AUTO: 26.1 PG (ref 27–33)
MCHC RBC AUTO-ENTMCNC: 32.3 G/DL (ref 33.6–35)
MCV RBC AUTO: 80.8 FL (ref 81.4–97.8)
MONOCYTES # BLD AUTO: 0.81 K/UL (ref 0–0.85)
MONOCYTES NFR BLD AUTO: 10.8 % (ref 0–13.4)
NEUTROPHILS # BLD AUTO: 4.16 K/UL (ref 2–7.15)
NEUTROPHILS NFR BLD: 55.6 % (ref 44–72)
NRBC # BLD AUTO: 0 K/UL
NRBC BLD AUTO-RTO: 0 /100 WBC
PLATELET # BLD AUTO: 315 K/UL (ref 164–446)
PMV BLD AUTO: 9.1 FL (ref 9–12.9)
POTASSIUM SERPL-SCNC: 3.7 MMOL/L (ref 3.6–5.5)
PROT SERPL-MCNC: 7.1 G/DL (ref 6–8.2)
PROT UR-MCNC: 32.9 MG/DL (ref 0–15)
PROT/CREAT UR: 919 MG/G (ref 10–107)
RBC # BLD AUTO: 4.59 M/UL (ref 4.2–5.4)
SODIUM SERPL-SCNC: 135 MMOL/L (ref 135–145)
WBC # BLD AUTO: 7.5 K/UL (ref 4.8–10.8)

## 2017-09-12 PROCEDURE — 80053 COMPREHEN METABOLIC PANEL: CPT

## 2017-09-12 PROCEDURE — 85025 COMPLETE CBC W/AUTO DIFF WBC: CPT

## 2017-09-12 PROCEDURE — 36591 DRAW BLOOD OFF VENOUS DEVICE: CPT

## 2017-09-12 PROCEDURE — 83735 ASSAY OF MAGNESIUM: CPT

## 2017-09-12 PROCEDURE — A4212 NON CORING NEEDLE OR STYLET: HCPCS

## 2017-09-12 PROCEDURE — 700111 HCHG RX REV CODE 636 W/ 250 OVERRIDE (IP)

## 2017-09-12 PROCEDURE — 84156 ASSAY OF PROTEIN URINE: CPT

## 2017-09-12 PROCEDURE — 82570 ASSAY OF URINE CREATININE: CPT

## 2017-09-12 RX ORDER — LIDOCAINE HYDROCHLORIDE 10 MG/ML
0.5 INJECTION, SOLUTION INFILTRATION; PERINEURAL SEE ADMIN INSTRUCTIONS
Status: CANCELLED | OUTPATIENT
Start: 2017-10-11

## 2017-09-12 RX ORDER — SODIUM CHLORIDE 9 MG/ML
INJECTION, SOLUTION INTRAVENOUS CONTINUOUS
Status: CANCELLED | OUTPATIENT
Start: 2017-10-11

## 2017-09-12 RX ORDER — ONDANSETRON 8 MG/1
8 TABLET, ORALLY DISINTEGRATING ORAL
Status: CANCELLED | OUTPATIENT
Start: 2017-10-04

## 2017-09-12 RX ORDER — SODIUM CHLORIDE 9 MG/ML
INJECTION, SOLUTION INTRAVENOUS CONTINUOUS
Status: CANCELLED | OUTPATIENT
Start: 2017-10-04

## 2017-09-12 RX ORDER — ONDANSETRON 2 MG/ML
4 INJECTION INTRAMUSCULAR; INTRAVENOUS
Status: CANCELLED | OUTPATIENT
Start: 2017-10-11

## 2017-09-12 RX ORDER — ONDANSETRON 2 MG/ML
4 INJECTION INTRAMUSCULAR; INTRAVENOUS
Status: CANCELLED | OUTPATIENT
Start: 2017-10-04

## 2017-09-12 RX ORDER — ONDANSETRON 8 MG/1
8 TABLET, ORALLY DISINTEGRATING ORAL
Status: CANCELLED | OUTPATIENT
Start: 2017-10-11

## 2017-09-12 RX ORDER — PROCHLORPERAZINE MALEATE 10 MG
10 TABLET ORAL EVERY 6 HOURS PRN
Status: CANCELLED | OUTPATIENT
Start: 2017-10-11

## 2017-09-12 RX ORDER — PROCHLORPERAZINE MALEATE 10 MG
10 TABLET ORAL EVERY 6 HOURS PRN
Status: CANCELLED | OUTPATIENT
Start: 2017-10-04

## 2017-09-12 RX ORDER — LIDOCAINE HYDROCHLORIDE 10 MG/ML
0.5 INJECTION, SOLUTION INFILTRATION; PERINEURAL SEE ADMIN INSTRUCTIONS
Status: CANCELLED | OUTPATIENT
Start: 2017-10-04

## 2017-09-12 RX ADMIN — HEPARIN 500 UNITS: 100 SYRINGE at 15:41

## 2017-09-12 ASSESSMENT — PAIN SCALES - GENERAL: PAINLEVEL: 2=MINIMAL-SLIGHT

## 2017-09-12 NOTE — PROGRESS NOTES
Pt is here for her scheduled lab draw and urine specimen prior tomorrow's chemotherapy.  She has had several rounds of chemo in the past - long discussion with the patient r/t treatment/medication/pain management. Port accessed in sterile fashion - pt declines Lidocaine Sub-Q. Labs drawn; urine sent. Port heparin locked and left accessed per pt request for tomorrow. Pt discharged home to self care.

## 2017-09-13 ENCOUNTER — OUTPATIENT INFUSION SERVICES (OUTPATIENT)
Dept: ONCOLOGY | Facility: MEDICAL CENTER | Age: 77
End: 2017-09-13
Attending: SPECIALIST
Payer: MEDICARE

## 2017-09-13 VITALS
TEMPERATURE: 97.2 F | HEART RATE: 68 BPM | DIASTOLIC BLOOD PRESSURE: 70 MMHG | OXYGEN SATURATION: 97 % | BODY MASS INDEX: 24.8 KG/M2 | RESPIRATION RATE: 18 BRPM | SYSTOLIC BLOOD PRESSURE: 147 MMHG | HEIGHT: 64 IN | WEIGHT: 145.28 LBS

## 2017-09-13 DIAGNOSIS — Z85.43 HISTORY OF OVARIAN CANCER: ICD-10-CM

## 2017-09-13 DIAGNOSIS — Z51.11 ENCOUNTER FOR ANTINEOPLASTIC CHEMOTHERAPY: ICD-10-CM

## 2017-09-13 DIAGNOSIS — C56.9 MALIGNANT NEOPLASM OF OVARY, UNSPECIFIED LATERALITY (HCC): ICD-10-CM

## 2017-09-13 PROCEDURE — 96375 TX/PRO/DX INJ NEW DRUG ADDON: CPT

## 2017-09-13 PROCEDURE — 700111 HCHG RX REV CODE 636 W/ 250 OVERRIDE (IP): Performed by: SPECIALIST

## 2017-09-13 PROCEDURE — 700111 HCHG RX REV CODE 636 W/ 250 OVERRIDE (IP)

## 2017-09-13 PROCEDURE — 96415 CHEMO IV INFUSION ADDL HR: CPT

## 2017-09-13 PROCEDURE — 96417 CHEMO IV INFUS EACH ADDL SEQ: CPT

## 2017-09-13 PROCEDURE — 700105 HCHG RX REV CODE 258: Performed by: SPECIALIST

## 2017-09-13 PROCEDURE — 700111 HCHG RX REV CODE 636 W/ 250 OVERRIDE (IP): Mod: JW

## 2017-09-13 PROCEDURE — 96413 CHEMO IV INFUSION 1 HR: CPT

## 2017-09-13 PROCEDURE — 700105 HCHG RX REV CODE 258

## 2017-09-13 RX ADMIN — SODIUM CHLORIDE 1000 MG: 9 INJECTION, SOLUTION INTRAVENOUS at 12:17

## 2017-09-13 RX ADMIN — GEMCITABINE 1710 MG: 1 INJECTION, POWDER, LYOPHILIZED, FOR SOLUTION INTRAVENOUS at 14:21

## 2017-09-13 RX ADMIN — CARBOPLATIN 245 MG: 10 INJECTION, SOLUTION INTRAVENOUS at 15:04

## 2017-09-13 RX ADMIN — HEPARIN 500 UNITS: 100 SYRINGE at 16:15

## 2017-09-13 RX ADMIN — ONDANSETRON HYDROCHLORIDE 16 MG: 2 SOLUTION INTRAMUSCULAR; INTRAVENOUS at 11:16

## 2017-09-13 ASSESSMENT — PAIN SCALES - GENERAL: PAINLEVEL: 4=SLIGHT-MODERATE PAIN

## 2017-09-13 NOTE — PROGRESS NOTES
"Pharmacy chemotherapy verification:    Patient Name: Rosanna Damico  Dx: recurrent ovarian cancer          Protocol: carboplatin/gemcitabine + bevacizumab     *Dosing Reference*  Carboplatin (Paraplatin) AUC 4 IV once on day 1  Gemcitabine (Gemzar) 1000 mg/m2 IV once per day on days 1 & 8  Bevacizumab (Avastin) 15 mg/kg IV once on day 1, given first  21-day cycle for 6 to 10 cycles, based on response; after completing these cycles of carboplatin, gemcitabine, and bevacizumab, patients continue on bevacizumab maintenance until progression of disease or unacceptable toxicity   Rayne SANTIAGO et al- [facility]ITA-  J Clin Oncol. 2012 Clif 10; 30(17): 5135-8189.     Allergies:  Cisplatin and Codeine     /70   Pulse 68   Temp 36.2 °C (97.2 °F)   Resp 18   Ht 1.626 m (5' 4.02\")   Wt 65.9 kg (145 lb 4.5 oz)   SpO2 97%   BMI 24.93 kg/m²  Body surface area is 1.73 meters squared.    Labs 9/12/17  ANC ~4160 Plt = 315k   Hgb = 12.0     SCr = 1.2 mg/dL CrCl ~ 42 mL/min   LFT's = WNL    TBili = 0.4    9/6/17 Carboplatin skin test: no reaction     Drug Order   (Drug name, dose, route, IV Fluid & volume, frequency, number of doses) Cycle:     Previous treatment: carboplatin skin test 9/6; 6 cycles Taxol, last 4/4/17     Medication = Gemcitabine  Base Dose= 1000 mg/m2   Calc Dose: Base Dose x 1.73 m2 = 1730 mg  Final Dose = 1710 mg  Route = IV  Fluid & Volume =  mL  Admin Duration = Over 30min          <5% difference, ok to treat with final dose     Medication = Carboplatin  Base Dose= AUC 4  Calc Dose: Base Dose x (42 + 25) = 268 mg  Final Dose = 245 mg  Route = IV  Fluid & Volume = NS 250mL  Admin Duration = Over 30 min          >5% difference, use baseline dose per Mayra CHAPMAN/Dr. Delcid,  ok to treat with final dose     Medication = Bevacizumab  Base Dose= 15mg/kg  Calc Dose:Base Dose x 65.9 kg = 988.5  Final Dose = 1000 mg  Route = IV  Fluid & Volume =  mL  Admin Duration = Over 90 min          <5% difference, " rounded to nearest vial size per protocol, ok to treat with final dose           By my signature below, I confirm this process was performed independently with the BSA and all final chemotherapy dosing calculations congruent. I have reviewed the above chemotherapy order and that my calculation of the final dose and BSA (when applicable) corroborate those calculations of the  pharmacist. Discrepancies of 5% or greater in the written dose have been addressed and documented within the EPIC Progress notes.    Dougie Thomas, AlmaD

## 2017-09-13 NOTE — PROGRESS NOTES
"Pharmacy Chemotherapy Note      Patient Name: Rosanna Damico  [id number]  C1D1  Oncologist: Bhavin  Protocol: OP OVARIAN CARBOplatin + gemcitabine + Avastin     Carboplatin (Paraplatin) AUC 4 IV once on day 1    Gemcitabine (Gemzar) 1000 mg/m2 IV once per day on days 1 & 8    Bevacizumab (Avastin) 15 mg/kg IV once on day 1, given first    *Dosing Reference*  Agustín PHILLIPS et al. Gemcitabine Plus Carboplatin Compared With Carboplatin in Patients With Platinum-Sensitive Recurrent Ovarian Cancer: An Intergroup Trial of the AGO-OVAR, the [facility] CTG, and the EORTC GCG. JCO Vol 24, No 29 (October 10), 2006: pp. 7110-3759     Rayne C, Arlene B, Travis LR, Otf GUZMANV, Sarah A, Cathy SV. Final overall survival and safety analysis of OCEANS, a phase 3 trial of chemotherapy with or without bevacizumab in patients with platinum-sensitive recurrent ovarian cancer. Gynecol Oncol. 2015 Oct;139(1):10-6. Epub 2015 Aug 10       Dx: Ovarian Cancer      Allergies:  Cisplatin and Codeine      /70   Pulse 68   Temp 36.2 °C (97.2 °F)   Resp 18   Ht 1.626 m (5' 4.02\")   Wt 65.9 kg (145 lb 4.5 oz)   SpO2 97%   BMI 24.93 kg/m²  Body surface area is 1.73 meters squared.    WBC:7.5                     Hgb/Hct: 12.0/37.1  ANC~ 4160  Plt = 315 k  SCr = 1.20 mg/dL CrCl = 42 ml/min  AST/ALT/TBili:26/13/0.4    Carboplatin AUC 4 IV = 268 mg on day 1                        <5% difference, OK to treat with final dose = 245 mg IV (OK to use per Dr. Delcid)    Gemcitabine 1000 mg/m2 IV x 1.73 m2 = 1730 mg on days 1 and 8                        <5% difference, OK to treat with final dose = 1710 mg IV     Bevacizumab 15 mg/kg IV x 65.9 kg = 988.5 on day 1                        <5% difference, OK to treat with final dose = 1000 mg IV       By my signature below, I confirm this process was performed independently with the BSA and all final chemotherapy dosing calculations congruent. I have reviewed the above chemotherapy order and that my calculation " of the final dose and BSA (when applicable) corroborate those calculations of the  pharmacist. Discrepancies of 5% or greater in the written dose have been addressed and documented within the EPIC Progress note.    Mina Palencia, AlmaD

## 2017-09-13 NOTE — PROGRESS NOTES
Chemotherapy Verification - SECONDARY RN       Height = 162.6cm  Weight = 65.9kg  BSA = 1.73m2    Medication: Bevacizumab  Dose: 15mg/kg  Calculated Dose: 988.5mg                            (In mg/m2, AUC, mg/kg)     Medication: Gemcitabine  Dose: 1000mg/m2  Calculated Dose: 1730mg                            (In mg/m2, AUC, mg/kg)    Medication: Carboplatin  Dose: AUC=4  Calculated Dose: 244.6mg  Baseline dosing per MD                            (In mg/m2, AUC, mg/kg)      Carboplatin calculation (if applicable):  (4*(36.156+25)) = 244.624    I confirm that this process was performed independently.

## 2017-09-13 NOTE — PROGRESS NOTES
Chemotherapy Verification - PRIMARY RN      Height = 64.02 in  Weight = 65.9 kg  BSA = 1.72 m2       Medication: Avastin  Dose: 15 mg/kg  Calculated Dose: 988.5 mg                             (In mg/m2, AUC, mg/kg)     Medication: Gemzar  Dose: 1000 mg/m2  Calculated Dose: 1722 mg                             (In mg/m2, AUC, mg/kg)    Medication: Carboplatin  Dose: AUC 4  Calculated Dose: 244.7 mg (calculated using baseline wt of 66 kg and creatinine of 1.4 per MD order)                             (In mg/m2, AUC, mg/kg)    Carboplatin calculation (if applicable):  (((140-75)*66)*(0.70+(1 *0.15))/(1.4*72)+25)*4 * ((1 =1)+(1 =2)) = 244.702      I confirm this process was performed independently with the BSA and all final chemotherapy dosing calculations congruent.  Any discrepancies of 5% or greater have been addressed with the chemotherapy pharmacist. The resolution of the discrepancy has been documented in the EPIC progress notes.

## 2017-09-13 NOTE — PROGRESS NOTES
Patient presents ambulatory for C1D1 of chemotherapy.  Patient reports feeling well and denies any s/s of infection at this time.  Labs drawn 9/12/17, reviewed and are within parameters to proceed with treatment.  UA not collected on 9/12/17, called MD office, order received to use UA from 9/6/17 for today's treatment.  KJ Nunez asked to clarify if MD would like UA and UCPR both each time or just one or the other, she stated she would get the clarification and adjust it in the treatment plan for future cycles.  Port remains accessed from yesterday's lab appt, flushed, and brisk blood return noted.  Pre medication given with no adverse reaction.  Chemotherapy infused per MD order with no complications or adverse reactions.  Patient to return 9/20/17 for next appt, confirmed with patient.  Port flushed per protocol, blood return noted, de accessed, gauze, and tape applied to site.  Patient left ambulatory in no distress.

## 2017-09-20 ENCOUNTER — OUTPATIENT INFUSION SERVICES (OUTPATIENT)
Dept: ONCOLOGY | Facility: MEDICAL CENTER | Age: 77
End: 2017-09-20
Attending: SPECIALIST
Payer: MEDICARE

## 2017-09-20 VITALS
HEIGHT: 64 IN | TEMPERATURE: 98 F | OXYGEN SATURATION: 98 % | WEIGHT: 141.54 LBS | RESPIRATION RATE: 18 BRPM | DIASTOLIC BLOOD PRESSURE: 55 MMHG | BODY MASS INDEX: 24.16 KG/M2 | SYSTOLIC BLOOD PRESSURE: 131 MMHG | HEART RATE: 68 BPM

## 2017-09-20 DIAGNOSIS — Z85.43 HISTORY OF OVARIAN CANCER: ICD-10-CM

## 2017-09-20 DIAGNOSIS — C56.9 MALIGNANT NEOPLASM OF OVARY, UNSPECIFIED LATERALITY (HCC): ICD-10-CM

## 2017-09-20 DIAGNOSIS — Z51.11 ENCOUNTER FOR ANTINEOPLASTIC CHEMOTHERAPY: ICD-10-CM

## 2017-09-20 LAB
ANISOCYTOSIS BLD QL SMEAR: ABNORMAL
BASOPHILS # BLD AUTO: 0 % (ref 0–1.8)
BASOPHILS # BLD: 0 K/UL (ref 0–0.12)
EOSINOPHIL # BLD AUTO: 0 K/UL (ref 0–0.51)
EOSINOPHIL NFR BLD: 0 % (ref 0–6.9)
ERYTHROCYTE [DISTWIDTH] IN BLOOD BY AUTOMATED COUNT: 43.1 FL (ref 35.9–50)
GIANT PLATELETS BLD QL SMEAR: NORMAL
HCT VFR BLD AUTO: 33.4 % (ref 37–47)
HGB BLD-MCNC: 10.7 G/DL (ref 12–16)
LYMPHOCYTES # BLD AUTO: 2.09 K/UL (ref 1–4.8)
LYMPHOCYTES NFR BLD: 42.6 % (ref 22–41)
MACROCYTES BLD QL SMEAR: ABNORMAL
MANUAL DIFF BLD: NORMAL
MCH RBC QN AUTO: 25.5 PG (ref 27–33)
MCHC RBC AUTO-ENTMCNC: 32 G/DL (ref 33.6–35)
MCV RBC AUTO: 79.5 FL (ref 81.4–97.8)
MICROCYTES BLD QL SMEAR: ABNORMAL
MONOCYTES # BLD AUTO: 0.34 K/UL (ref 0–0.85)
MONOCYTES NFR BLD AUTO: 7 % (ref 0–13.4)
MORPHOLOGY BLD-IMP: NORMAL
NEUTROPHILS # BLD AUTO: 2.47 K/UL (ref 2–7.15)
NEUTROPHILS NFR BLD: 50.4 % (ref 44–72)
NRBC # BLD AUTO: 0 K/UL
NRBC BLD AUTO-RTO: 0 /100 WBC
OVALOCYTES BLD QL SMEAR: NORMAL
PLATELET # BLD AUTO: 148 K/UL (ref 164–446)
PLATELET BLD QL SMEAR: NORMAL
PMV BLD AUTO: 8.4 FL (ref 9–12.9)
RBC # BLD AUTO: 4.2 M/UL (ref 4.2–5.4)
RBC BLD AUTO: PRESENT
VARIANT LYMPHS BLD QL SMEAR: NORMAL
WBC # BLD AUTO: 4.9 K/UL (ref 4.8–10.8)

## 2017-09-20 PROCEDURE — A4212 NON CORING NEEDLE OR STYLET: HCPCS

## 2017-09-20 PROCEDURE — 700105 HCHG RX REV CODE 258: Performed by: SPECIALIST

## 2017-09-20 PROCEDURE — 700111 HCHG RX REV CODE 636 W/ 250 OVERRIDE (IP)

## 2017-09-20 PROCEDURE — 96413 CHEMO IV INFUSION 1 HR: CPT

## 2017-09-20 PROCEDURE — 700105 HCHG RX REV CODE 258

## 2017-09-20 PROCEDURE — 85027 COMPLETE CBC AUTOMATED: CPT

## 2017-09-20 PROCEDURE — 700111 HCHG RX REV CODE 636 W/ 250 OVERRIDE (IP): Performed by: SPECIALIST

## 2017-09-20 PROCEDURE — 85007 BL SMEAR W/DIFF WBC COUNT: CPT

## 2017-09-20 RX ORDER — ACETAMINOPHEN 500 MG
500-1000 TABLET ORAL EVERY 6 HOURS PRN
Status: ON HOLD | COMMUNITY
End: 2018-01-01

## 2017-09-20 RX ORDER — ONDANSETRON 8 MG/1
8 TABLET, ORALLY DISINTEGRATING ORAL
Status: DISCONTINUED | OUTPATIENT
Start: 2017-09-20 | End: 2017-09-20 | Stop reason: HOSPADM

## 2017-09-20 RX ADMIN — HEPARIN 500 UNITS: 100 SYRINGE at 16:50

## 2017-09-20 RX ADMIN — ONDANSETRON 8 MG: 8 TABLET, ORALLY DISINTEGRATING ORAL at 15:00

## 2017-09-20 RX ADMIN — GEMCITABINE 1710 MG: 1 INJECTION, POWDER, LYOPHILIZED, FOR SOLUTION INTRAVENOUS at 16:00

## 2017-09-20 ASSESSMENT — PAIN SCALES - GENERAL: PAINLEVEL: NO PAIN

## 2017-09-20 NOTE — PROGRESS NOTES
Chemotherapy Verification - SECONDARY RN       Height = 162.6 cm  Weight = 64.2 kg  BSA = 1.7 m2       Medication: Gemzar  Dose: 1000 mg/m2  Calculated Dose: 1700 mg                             (In mg/m2, AUC, mg/kg)       I confirm that this process was performed independently.

## 2017-09-20 NOTE — PROGRESS NOTES
"Pharmacy chemotherapy verification:    Patient Name: Rosanna Damico  Dx: recurrent ovarian cancer          Protocol: Carboplatin/Gemcitabine + Bevacizumab     *Dosing Reference*  Carboplatin (Paraplatin) AUC 4 IV once on day 1  Gemcitabine (Gemzar) 1000 mg/m2 IV once per day on days 1 & 8  Bevacizumab (Avastin) 15 mg/kg IV once on day 1, given first  21-day cycle for 6 to 10 cycles, based on response; after completing these cycles of carboplatin, gemcitabine, and bevacizumab, patients continue on bevacizumab maintenance until progression of disease or unacceptable toxicity   Rayne SANTIAGO et al- TITA-  J Clin Oncol. 2012 Clif 10; 30(17): 4149-4104.     Allergies:  Cisplatin and Codeine     /55   Pulse 68   Temp 36.7 °C (98 °F)   Resp 18   Ht 1.626 m (5' 4.02\")   Wt 64.2 kg (141 lb 8.6 oz)   SpO2 98%   BMI 24.28 kg/m²  Body surface area is 1.7 meters squared.    Labs 9/20/17  ANC ~2470 Plt = 148k   Hgb = 10.7  Labs 9/12/17  SCr = 1.2 mg/dL CrCl ~ 42 mL/min   LFT's = WNL    TBili = 0.4    9/6/17 Carboplatin skin test: no reaction     Drug Order   (Drug name, dose, route, IV Fluid & volume, frequency, number of doses) Cycle: 1 Day 8     Previous treatment: C1D1 = 9/13/17 (s/p carboplatin skin test 9/6; 6 cycles Taxol, last 4/4/17)     Medication = Gemcitabine  Base Dose= 1000 mg/m2   Calc Dose: Base Dose x 1.7 m2 = 1700 mg  Final Dose = 1710 mg  Route = IV  Fluid & Volume =  mL  Admin Duration = Over 30min          <5% difference, ok to treat with final dose           By my signature below, I confirm this process was performed independently with the BSA and all final chemotherapy dosing calculations congruent. I have reviewed the above chemotherapy order and that my calculation of the final dose and BSA (when applicable) corroborate those calculations of the  pharmacist. Discrepancies of 5% or greater in the written dose have been addressed and documented within the EPIC Progress " notes.    Dougie Thomas, AlmaD

## 2017-09-20 NOTE — PROGRESS NOTES
Chemotherapy Verification - PRIMARY RN      Height = 64.02 in  Weight = 141 lb  BSA = 1.7 m2       Medication: Gemzar  Dose: 1000 mg/m2  Calculated Dose: 1700 mg                             (In mg/m2, AUC, mg/kg)                                 I confirm this process was performed independently with the BSA and all final chemotherapy dosing calculations congruent.  Any discrepancies of 5% or greater have been addressed with the chemotherapy pharmacist. The resolution of the discrepancy has been documented in the EPIC progress notes.

## 2017-09-20 NOTE — PROGRESS NOTES
Mrs Damico into [facility] for Day 8 / Cycle 1 of Gemzar for Ovarian Cancer. Pt denied having any new or acute complaints today, reports tolerating past treatments well. Physician aware of hematuria, patient to call if worsen. (Port accessed in a sterile manner, had + blood return, flushed briskly.) Pt given chemotherapy as prescribed, tolerated well, denied having any complaints during or after infusion. (Port had + blood return after, flushed per [facility] policy, de-accessed, needle intact, insertion site covered with sterile gauze and paper tape.). Discharged home to self care in [facility]. Appointment confirmed for next treatment.

## 2017-09-20 NOTE — PROGRESS NOTES
"Pharmacy Chemotherapy Verification    Patient Name: Rosanna Damico  Dx: Ovarian Cancer      Cycle: Cycle 1 Day 8  Previous treatment: C1D1 9/13/17    Protocol: CARBOplatin + Gemcitabine + Avastin     Carboplatin (Paraplatin) AUC 4 IV once on day 1    Gemcitabine (Gemzar) 1000 mg/m2 IV once per day on days 1 & 8    Bevacizumab (Avastin) 15 mg/kg IV once on day 1, given first    *Dosing Reference*  Agustín PHILLIPS et al. Gemcitabine Plus Carboplatin Compared With Carboplatin in Patients With Platinum-Sensitive Recurrent Ovarian Cancer: An Intergroup Trial of the AGO-OVAR, the [facility] [facility] CTG, and the EORTC GCG. JCO Vol 24, No 29 (October 10), 2006: pp. 8162-4217     Rayne C, Arlene B, Travis LR, Otf GUZMANV, Sarah A, Cathy SV. Final overall survival and safety analysis of OCEANS, a phase 3 trial of chemotherapy with or without bevacizumab in patients with platinum-sensitive recurrent ovarian cancer. Gynecol Oncol. 2015 Oct;139(1):10-6. Epub 2015 Aug 10     Allergies:  Cisplatin and Codeine    /55   Pulse 68   Temp 36.7 °C (98 °F)   Resp 18   Ht 1.626 m (5' 4.02\")   Wt 64.2 kg (141 lb 8.6 oz)   SpO2 98%   BMI 24.28 kg/m²  Body surface area is 1.7 meters squared.    Labs 9/20/17            ANC 2470 Plt = 148 k  Hgb = 10.7    Labs 9/12/17  SCr = 1.20 mg/dL CrCl = 42 ml/min  AST/ALT/AP = 26/13/75  TBili = 0.4    Gemcitabine 1000 mg/m2 x 1.7 m2 = 1700 mg    <5% difference, OK to treat with final dose = 1710 mg IV    By my signature below, I confirm this process was performed independently with the BSA and all final chemotherapy dosing calculations congruent. I have reviewed the above chemotherapy order and that my calculation of the final dose and BSA (when applicable) corroborate those calculations of the  pharmacist. Discrepancies of 5% or greater in the written dose have been addressed and documented within the EPIC Progress note.    Savana Tejada, PharmD  "

## 2017-10-03 ENCOUNTER — OUTPATIENT INFUSION SERVICES (OUTPATIENT)
Dept: ONCOLOGY | Facility: MEDICAL CENTER | Age: 77
End: 2017-10-03
Attending: SPECIALIST
Payer: MEDICARE

## 2017-10-03 VITALS
HEART RATE: 70 BPM | TEMPERATURE: 97.6 F | WEIGHT: 143.74 LBS | RESPIRATION RATE: 18 BRPM | OXYGEN SATURATION: 100 % | DIASTOLIC BLOOD PRESSURE: 47 MMHG | SYSTOLIC BLOOD PRESSURE: 123 MMHG | BODY MASS INDEX: 24.66 KG/M2

## 2017-10-03 DIAGNOSIS — C56.9 MALIGNANT NEOPLASM OF OVARY, UNSPECIFIED LATERALITY (HCC): ICD-10-CM

## 2017-10-03 LAB
ALBUMIN SERPL BCP-MCNC: 3.7 G/DL (ref 3.2–4.9)
ALBUMIN/GLOB SERPL: 1.2 G/DL
ALP SERPL-CCNC: 74 U/L (ref 30–99)
ALT SERPL-CCNC: 43 U/L (ref 2–50)
ANION GAP SERPL CALC-SCNC: 8 MMOL/L (ref 0–11.9)
APPEARANCE UR: ABNORMAL
AST SERPL-CCNC: 37 U/L (ref 12–45)
BACTERIA #/AREA URNS HPF: ABNORMAL /HPF
BASOPHILS # BLD AUTO: 0.7 % (ref 0–1.8)
BASOPHILS # BLD: 0.03 K/UL (ref 0–0.12)
BILIRUB SERPL-MCNC: 0.3 MG/DL (ref 0.1–1.5)
BILIRUB UR QL STRIP.AUTO: NEGATIVE
BUN SERPL-MCNC: 26 MG/DL (ref 8–22)
CALCIUM SERPL-MCNC: 8.9 MG/DL (ref 8.5–10.5)
CANCER AG125 SERPL-ACNC: 126.3 U/ML (ref 0–35)
CHLORIDE SERPL-SCNC: 101 MMOL/L (ref 96–112)
CO2 SERPL-SCNC: 23 MMOL/L (ref 20–33)
COLOR UR: YELLOW
CREAT SERPL-MCNC: 1.31 MG/DL (ref 0.5–1.4)
CREAT UR-MCNC: 45.8 MG/DL
EOSINOPHIL # BLD AUTO: 0.1 K/UL (ref 0–0.51)
EOSINOPHIL NFR BLD: 2.2 % (ref 0–6.9)
EPI CELLS #/AREA URNS HPF: NEGATIVE /HPF
ERYTHROCYTE [DISTWIDTH] IN BLOOD BY AUTOMATED COUNT: 48 FL (ref 35.9–50)
GFR SERPL CREATININE-BSD FRML MDRD: 40 ML/MIN/1.73 M 2
GLOBULIN SER CALC-MCNC: 3 G/DL (ref 1.9–3.5)
GLUCOSE SERPL-MCNC: 118 MG/DL (ref 65–99)
GLUCOSE UR STRIP.AUTO-MCNC: NEGATIVE MG/DL
HCT VFR BLD AUTO: 31.2 % (ref 37–47)
HGB BLD-MCNC: 9.9 G/DL (ref 12–16)
HYALINE CASTS #/AREA URNS LPF: ABNORMAL /LPF
IMM GRANULOCYTES # BLD AUTO: 0.03 K/UL (ref 0–0.11)
IMM GRANULOCYTES NFR BLD AUTO: 0.7 % (ref 0–0.9)
KETONES UR STRIP.AUTO-MCNC: NEGATIVE MG/DL
LEUKOCYTE ESTERASE UR QL STRIP.AUTO: ABNORMAL
LYMPHOCYTES # BLD AUTO: 1.59 K/UL (ref 1–4.8)
LYMPHOCYTES NFR BLD: 34.9 % (ref 22–41)
MAGNESIUM SERPL-MCNC: 2 MG/DL (ref 1.5–2.5)
MCH RBC QN AUTO: 26.6 PG (ref 27–33)
MCHC RBC AUTO-ENTMCNC: 31.7 G/DL (ref 33.6–35)
MCV RBC AUTO: 83.9 FL (ref 81.4–97.8)
MICRO URNS: ABNORMAL
MONOCYTES # BLD AUTO: 0.84 K/UL (ref 0–0.85)
MONOCYTES NFR BLD AUTO: 18.4 % (ref 0–13.4)
NEUTROPHILS # BLD AUTO: 1.97 K/UL (ref 2–7.15)
NEUTROPHILS NFR BLD: 43.1 % (ref 44–72)
NITRITE UR QL STRIP.AUTO: NEGATIVE
NRBC # BLD AUTO: 0 K/UL
NRBC BLD AUTO-RTO: 0 /100 WBC
PH UR STRIP.AUTO: 8 [PH]
PLATELET # BLD AUTO: 415 K/UL (ref 164–446)
PMV BLD AUTO: 9 FL (ref 9–12.9)
POTASSIUM SERPL-SCNC: 4.4 MMOL/L (ref 3.6–5.5)
PROT SERPL-MCNC: 6.7 G/DL (ref 6–8.2)
PROT UR QL STRIP: 30 MG/DL
PROT UR-MCNC: 30.7 MG/DL (ref 0–15)
PROT/CREAT UR: 670 MG/G (ref 10–107)
RBC # BLD AUTO: 3.72 M/UL (ref 4.2–5.4)
RBC # URNS HPF: ABNORMAL /HPF
RBC UR QL AUTO: ABNORMAL
RENAL EPI CELLS #/AREA URNS HPF: ABNORMAL /HPF
SODIUM SERPL-SCNC: 132 MMOL/L (ref 135–145)
SP GR UR STRIP.AUTO: 1
UROBILINOGEN UR STRIP.AUTO-MCNC: NORMAL MG/DL
WBC # BLD AUTO: 4.6 K/UL (ref 4.8–10.8)
WBC #/AREA URNS HPF: ABNORMAL /HPF
YEAST HYPHAE #/AREA URNS HPF: PRESENT /HPF

## 2017-10-03 PROCEDURE — 36591 DRAW BLOOD OFF VENOUS DEVICE: CPT

## 2017-10-03 PROCEDURE — 82570 ASSAY OF URINE CREATININE: CPT

## 2017-10-03 PROCEDURE — A4212 NON CORING NEEDLE OR STYLET: HCPCS

## 2017-10-03 PROCEDURE — 86304 IMMUNOASSAY TUMOR CA 125: CPT

## 2017-10-03 PROCEDURE — 83735 ASSAY OF MAGNESIUM: CPT

## 2017-10-03 PROCEDURE — 87086 URINE CULTURE/COLONY COUNT: CPT

## 2017-10-03 PROCEDURE — 81001 URINALYSIS AUTO W/SCOPE: CPT

## 2017-10-03 PROCEDURE — 700111 HCHG RX REV CODE 636 W/ 250 OVERRIDE (IP)

## 2017-10-03 PROCEDURE — 80053 COMPREHEN METABOLIC PANEL: CPT

## 2017-10-03 PROCEDURE — 84156 ASSAY OF PROTEIN URINE: CPT

## 2017-10-03 PROCEDURE — 85025 COMPLETE CBC W/AUTO DIFF WBC: CPT

## 2017-10-03 RX ADMIN — HEPARIN 500 UNITS: 100 SYRINGE at 15:20

## 2017-10-03 ASSESSMENT — PAIN SCALES - GENERAL: PAINLEVEL: NO PAIN

## 2017-10-04 ENCOUNTER — OUTPATIENT INFUSION SERVICES (OUTPATIENT)
Dept: ONCOLOGY | Facility: MEDICAL CENTER | Age: 77
End: 2017-10-04
Attending: SPECIALIST
Payer: MEDICARE

## 2017-10-04 VITALS
DIASTOLIC BLOOD PRESSURE: 43 MMHG | HEART RATE: 64 BPM | WEIGHT: 141.76 LBS | RESPIRATION RATE: 18 BRPM | SYSTOLIC BLOOD PRESSURE: 112 MMHG | BODY MASS INDEX: 24.2 KG/M2 | OXYGEN SATURATION: 100 % | HEIGHT: 64 IN | TEMPERATURE: 97.1 F

## 2017-10-04 DIAGNOSIS — C56.9 MALIGNANT NEOPLASM OF OVARY, UNSPECIFIED LATERALITY (HCC): ICD-10-CM

## 2017-10-04 DIAGNOSIS — Z51.11 ENCOUNTER FOR ANTINEOPLASTIC CHEMOTHERAPY: ICD-10-CM

## 2017-10-04 DIAGNOSIS — Z85.43 HISTORY OF OVARIAN CANCER: ICD-10-CM

## 2017-10-04 PROCEDURE — 700105 HCHG RX REV CODE 258

## 2017-10-04 PROCEDURE — 96417 CHEMO IV INFUS EACH ADDL SEQ: CPT

## 2017-10-04 PROCEDURE — 96413 CHEMO IV INFUSION 1 HR: CPT

## 2017-10-04 PROCEDURE — 96375 TX/PRO/DX INJ NEW DRUG ADDON: CPT

## 2017-10-04 PROCEDURE — 700111 HCHG RX REV CODE 636 W/ 250 OVERRIDE (IP): Performed by: SPECIALIST

## 2017-10-04 PROCEDURE — 700105 HCHG RX REV CODE 258: Performed by: SPECIALIST

## 2017-10-04 PROCEDURE — 700111 HCHG RX REV CODE 636 W/ 250 OVERRIDE (IP)

## 2017-10-04 RX ORDER — SODIUM CHLORIDE 9 MG/ML
INJECTION, SOLUTION INTRAVENOUS CONTINUOUS
Status: DISCONTINUED | OUTPATIENT
Start: 2017-10-04 | End: 2017-10-04 | Stop reason: HOSPADM

## 2017-10-04 RX ADMIN — GEMCITABINE 1710 MG: 1 INJECTION, POWDER, LYOPHILIZED, FOR SOLUTION INTRAVENOUS at 11:30

## 2017-10-04 RX ADMIN — SODIUM CHLORIDE 1000 MG: 9 INJECTION, SOLUTION INTRAVENOUS at 13:32

## 2017-10-04 RX ADMIN — CARBOPLATIN 245 MG: 10 INJECTION, SOLUTION INTRAVENOUS at 12:30

## 2017-10-04 RX ADMIN — SODIUM CHLORIDE: 9 INJECTION, SOLUTION INTRAVENOUS at 10:20

## 2017-10-04 RX ADMIN — ONDANSETRON HYDROCHLORIDE 16 MG: 2 SOLUTION INTRAMUSCULAR; INTRAVENOUS at 10:41

## 2017-10-04 RX ADMIN — HEPARIN 500 UNITS: 100 SYRINGE at 14:40

## 2017-10-04 ASSESSMENT — PAIN SCALES - GENERAL: PAINLEVEL: 2=MINIMAL-SLIGHT

## 2017-10-04 NOTE — PROGRESS NOTES
"Pharmacy Chemotherapy Verification    Patient Name: Rosanna Damico  Dx: Ovarian Cancer      Cycle: Cycle 2 Day 1  Previous treatment: C1D8 = 9/20/17    Protocol: CARBOplatin + Gemcitabine + Avastin     Carboplatin (Paraplatin) AUC 4 IV once on day 1    Gemcitabine (Gemzar) 1000 mg/m2 IV once per day on days 1 & 8    Bevacizumab (Avastin) 15 mg/kg IV once on day 1, given first    *Dosing Reference*  Agustín PHILLIPS et al. Gemcitabine Plus Carboplatin Compared With Carboplatin in Patients With Platinum-Sensitive Recurrent Ovarian Cancer: An Intergroup Trial of the AGO-OVAR, the [facility] CTG, and the EORTC GCG. JCO Vol 24, No 29 (October 10), 2006: pp. 4575-5095     Rayne C, Arlene B, Travis LR, Otf GUZMANV, Sarah A, Cathy SV. Final overall survival and safety analysis of OCEANS, a phase 3 trial of chemotherapy with or without bevacizumab in patients with platinum-sensitive recurrent ovarian cancer. Gynecol Oncol. 2015 Oct;139(1):10-6. Epub 2015 Aug 10     Allergies:  Cisplatin and Codeine    /42   Pulse (!) 51   Temp 36.2 °C (97.1 °F)   Resp 18   Ht 1.626 m (5' 4.02\")   Wt 64.3 kg (141 lb 12.1 oz)   SpO2 100%   BMI 24.32 kg/m²  Body surface area is 1.7 meters squared.    Labs 10/3/17            ANC ~1970 Plt = 415k  Hgb = 9.9  SCr = 1.31 mg/dL (OK per Dr. Delcid, proceed with treatment 10/4/17) CrCl = 38 ml/min  AST/ALT/AP = WNL  TBili = 0.3    Urine protein = 30    Bevacizumab 15 mg/kg IV x 64.3 kg = 964.5 on day 1                        <5% difference, OK to treat with final dose = 1000 mg IV     Gemcitabine 1000 mg/m2 x 1.7 m2 = 1700 mg    <5% difference, OK to treat with final dose = 1710 mg IV    Carboplatin AUC 4 IV = 252 mg on day 1                        <5% difference, OK to treat with final dose = 245 mg IV        Dennis Thomas, PharmD      "

## 2017-10-04 NOTE — PROGRESS NOTES
Patient presents for day one cycle two Gemzar, Carboplatin, Avastin. Reviewed plan of care, questions answered as needed. Port accessed 10/3/2017, flushes well with brisk blood return. Infusions completed with no new patient complaints, line flushed clear. Port flushed per protocol with brisk blood return and de-accessed, sterile gauze dressing to site. Patient scheduled for next appointment and released in no acute distress with her .

## 2017-10-04 NOTE — PROGRESS NOTES
Patient arrived to Infusion for lab draw; chemo treatment scheduled for tomorrow.  UA sample obtained, pt reports dysuria.  Port accessed in sterile field, flushed with brisk blood return noted. Labs drawn as ordered.  Port flushed per protocol, saline capped and taped to dressing.  Port remained accessed for tomorrow's appt. Pt discharged home in great spirits under no apparent distress.

## 2017-10-04 NOTE — PROGRESS NOTES
Chemotherapy Verification - SECONDARY RN       Height = 64.02 in  Weight = 141 lb  BSA = 1.7 m2       Medication: Avastin  Dose: 15 mg/kg  Calculated Dose: 964.5 mg / Given dose 1000 mg                             (In mg/m2, AUC, mg/kg)     Medication: Gemzar  Dose: 1000 mg/m2  Calculated Dose: 1700 mg                             (In mg/m2, AUC, mg/kg)    Medication: Carboplatin  Dose: 4 AUC  Calculated Dose: 250.66 mg                             (In mg/m2, AUC, mg/kg)      Carboplatin calculation (if applicable): (((140-75)*[id number])*(0.70+(1 *0.15))/(1.31*72)+25)*4 * ((1 =1)+(1 =2)) = 250.66 mg    I confirm that this process was performed independently.

## 2017-10-04 NOTE — PROGRESS NOTES
Chemotherapy Verification - PRIMARY RN      Height = 162.6 cm  Weight = 64.3 kg  BSA = 1.7 m2       Medication: Avastin  Dose: 15 mg/kg  Calculated Dose: 960 mg                             (In mg/m2, AUC, mg/kg)     Medication: Gemzar  Dose: 1,000 mg/m2  Calculated Dose: 1,700 mg                             (In mg/m2, AUC, mg/kg)    Medication: Carboplatin  Dose: AUC 2  Calculated Dose: 251.819                            (In mg/m2, AUC, mg/kg      Carboplatin calculation: (((140-75)*[id number])*(0.70+(1 *0.15))/(1.3*72)+25)*4 * ((1 =1)+(1 =2)) = 251.819                                       I confirm this process was performed independently with the BSA and all final chemotherapy dosing calculations congruent.  Any discrepancies of 5% or greater have been addressed with the chemotherapy pharmacist. The resolution of the discrepancy has been documented in the EPIC progress notes.

## 2017-10-05 LAB
BACTERIA UR CULT: ABNORMAL
BACTERIA UR CULT: ABNORMAL
SIGNIFICANT IND 70042: ABNORMAL
SITE SITE: ABNORMAL
SOURCE SOURCE: ABNORMAL

## 2017-10-11 ENCOUNTER — OUTPATIENT INFUSION SERVICES (OUTPATIENT)
Dept: ONCOLOGY | Facility: MEDICAL CENTER | Age: 77
End: 2017-10-11
Attending: SPECIALIST
Payer: MEDICARE

## 2017-10-11 VITALS
OXYGEN SATURATION: 100 % | DIASTOLIC BLOOD PRESSURE: 57 MMHG | HEART RATE: 57 BPM | RESPIRATION RATE: 18 BRPM | BODY MASS INDEX: 24.46 KG/M2 | SYSTOLIC BLOOD PRESSURE: 133 MMHG | TEMPERATURE: 98 F | WEIGHT: 143.3 LBS | HEIGHT: 64 IN

## 2017-10-11 DIAGNOSIS — C56.9 MALIGNANT NEOPLASM OF OVARY, UNSPECIFIED LATERALITY (HCC): ICD-10-CM

## 2017-10-11 DIAGNOSIS — Z51.11 ENCOUNTER FOR ANTINEOPLASTIC CHEMOTHERAPY: ICD-10-CM

## 2017-10-11 DIAGNOSIS — Z85.43 HISTORY OF OVARIAN CANCER: ICD-10-CM

## 2017-10-11 LAB
BASOPHILS # BLD AUTO: 0.9 % (ref 0–1.8)
BASOPHILS # BLD: 0.02 K/UL (ref 0–0.12)
EOSINOPHIL # BLD AUTO: 0.02 K/UL (ref 0–0.51)
EOSINOPHIL NFR BLD: 0.9 % (ref 0–6.9)
ERYTHROCYTE [DISTWIDTH] IN BLOOD BY AUTOMATED COUNT: 48.8 FL (ref 35.9–50)
HCT VFR BLD AUTO: 29.8 % (ref 37–47)
HGB BLD-MCNC: 9.5 G/DL (ref 12–16)
IMM GRANULOCYTES # BLD AUTO: 0.02 K/UL (ref 0–0.11)
IMM GRANULOCYTES NFR BLD AUTO: 0.9 % (ref 0–0.9)
LYMPHOCYTES # BLD AUTO: 0.98 K/UL (ref 1–4.8)
LYMPHOCYTES NFR BLD: 44.3 % (ref 22–41)
MCH RBC QN AUTO: 26.2 PG (ref 27–33)
MCHC RBC AUTO-ENTMCNC: 31.9 G/DL (ref 33.6–35)
MCV RBC AUTO: 82.1 FL (ref 81.4–97.8)
MONOCYTES # BLD AUTO: 0.42 K/UL (ref 0–0.85)
MONOCYTES NFR BLD AUTO: 19 % (ref 0–13.4)
NEUTROPHILS # BLD AUTO: 0.75 K/UL (ref 2–7.15)
NEUTROPHILS NFR BLD: 34 % (ref 44–72)
NRBC # BLD AUTO: 0 K/UL
NRBC BLD AUTO-RTO: 0 /100 WBC
PLATELET # BLD AUTO: 260 K/UL (ref 164–446)
PMV BLD AUTO: 8.9 FL (ref 9–12.9)
RBC # BLD AUTO: 3.63 M/UL (ref 4.2–5.4)
WBC # BLD AUTO: 2.2 K/UL (ref 4.8–10.8)

## 2017-10-11 PROCEDURE — 700111 HCHG RX REV CODE 636 W/ 250 OVERRIDE (IP)

## 2017-10-11 PROCEDURE — 700105 HCHG RX REV CODE 258

## 2017-10-11 PROCEDURE — A4212 NON CORING NEEDLE OR STYLET: HCPCS

## 2017-10-11 PROCEDURE — 700111 HCHG RX REV CODE 636 W/ 250 OVERRIDE (IP): Performed by: SPECIALIST

## 2017-10-11 PROCEDURE — 96413 CHEMO IV INFUSION 1 HR: CPT

## 2017-10-11 PROCEDURE — 85025 COMPLETE CBC W/AUTO DIFF WBC: CPT

## 2017-10-11 PROCEDURE — 700105 HCHG RX REV CODE 258: Performed by: SPECIALIST

## 2017-10-11 PROCEDURE — 96375 TX/PRO/DX INJ NEW DRUG ADDON: CPT

## 2017-10-11 RX ORDER — SODIUM CHLORIDE 9 MG/ML
INJECTION, SOLUTION INTRAVENOUS CONTINUOUS
Status: DISCONTINUED | OUTPATIENT
Start: 2017-10-11 | End: 2017-10-11 | Stop reason: HOSPADM

## 2017-10-11 RX ORDER — ONDANSETRON 2 MG/ML
4 INJECTION INTRAMUSCULAR; INTRAVENOUS
Status: DISCONTINUED | OUTPATIENT
Start: 2017-10-11 | End: 2017-10-11 | Stop reason: HOSPADM

## 2017-10-11 RX ADMIN — GEMCITABINE 1710 MG: 1 INJECTION, POWDER, LYOPHILIZED, FOR SOLUTION INTRAVENOUS at 13:10

## 2017-10-11 RX ADMIN — ONDANSETRON 4 MG: 2 INJECTION, SOLUTION INTRAMUSCULAR; INTRAVENOUS at 12:52

## 2017-10-11 RX ADMIN — HEPARIN 500 UNITS: 100 SYRINGE at 13:55

## 2017-10-11 RX ADMIN — SODIUM CHLORIDE: 9 INJECTION, SOLUTION INTRAVENOUS at 11:00

## 2017-10-11 ASSESSMENT — PAIN SCALES - GENERAL: PAINLEVEL: 2=MINIMAL-SLIGHT

## 2017-10-11 NOTE — PROGRESS NOTES
Chemotherapy Verification - PRIMARY RN      Height = 162.6 cm  Weight = 65 kg  BSA = 1.71 m2       Medication: Gemzar  Dose: 1,000 mg/m2  Calculated Dose: 1,710 mg                             (In mg/m2, AUC, mg/kg)         I confirm this process was performed independently with the BSA and all final chemotherapy dosing calculations congruent.  Any discrepancies of 5% or greater have been addressed with the chemotherapy pharmacist. The resolution of the discrepancy has been documented in the EPIC progress notes.

## 2017-10-11 NOTE — PROGRESS NOTES
"Pharmacy chemotherapy verification:    Patient Name: Rosanna Damico  Dx: recurrent ovarian cancer          Protocol: carboplatin/gemcitabine + bevacizumab     *Dosing Reference*  Carboplatin (Paraplatin) AUC 4 IV once on day 1  Gemcitabine (Gemzar) 1000 mg/m2 IV once per day on days 1 & 8  Bevacizumab (Avastin) 15 mg/kg IV once on day 1, given first   21-day cycle for 6 to 10 cycles, based on response; after completing these cycles of carboplatin, gemcitabine, and bevacizumab, patients continue on bevacizumab maintenance until progression of disease or unacceptable toxicity   Rayne [facility]ANTIAGO et al- [facility]-  J Clin Oncol. 2012 Clif 10; 30(17): 6547-7018.     Allergies:  Cisplatin and Codeine     /57   Pulse (!) 57   Temp 36.7 °C (98 °F)   Resp 18   Ht 1.626 m (5' 4.02\")   Wt 65 kg (143 lb 4.8 oz)   SpO2 100%   BMI 24.58 kg/m²  Body surface area is 1.71 meters squared.      10/11/2017:  ANC: 750     Plt: 260k   Hgb: 9.5  Bhavin NICOLE aware of current counts, OK to treat C2D8 as ordered  10/3/17:   SCr: 1.31mg/dL (OK per MD) CrCl ~ 37mL/min     LFT's: NWL TBili = 0.3   Total protein, urine: 30.7  Protein creatinine:45.8     UPCR: 0.670 g/g    9/6/17 Carboplatin skin test: no reaction     Drug Order   (Drug name, dose, route, IV Fluid & volume, frequency, number of doses) Cycle: C2D8  Previous treatment: 10/4/17  carboplatin skin test 9/6/17   Medication = Gemcitabine  Base Dose= 1000 mg/m2   Calc Dose: Base Dose x 1.71 m2 = 1710 mg  Final Dose = 1710 mg  Route = IV  Fluid & Volume =  mL  Admin Duration = Over 30min          <5% difference, ok to treat with final dose       By my signature below, I confirm this process was performed independently with the BSA and all final chemotherapy dosing calculations congruent. I have reviewed the above chemotherapy order and that my calculation of the final dose and BSA (when applicable) corroborate those calculations of the  pharmacist. Discrepancies of " 5% or greater in the written dose have been addressed and documented within the EPIC Progress notes.    Jj Hugo, PharmD

## 2017-10-11 NOTE — PROGRESS NOTES
"Pharmacy Chemotherapy Verification    Patient Name: Rosanna Damico  Dx: Ovarian Cancer        Cycle: Cycle 2 Day 8  Previous treatment: 10/4/17    Protocol: CARBOplatin + Gemcitabine + Avastin     Carboplatin (Paraplatin) AUC 4 IV once on day 1    Gemcitabine (Gemzar) 1000 mg/m2 IV once per day on days 1 & 8    Bevacizumab (Avastin) 15 mg/kg IV once on day 1, given first    *Dosing Reference*  Agustín PHILLIPS et al. Gemcitabine Plus Carboplatin Compared With Carboplatin in Patients With Platinum-Sensitive Recurrent Ovarian Cancer: An Intergroup Trial of the AGO-OVAR, the [facility] CTG, and the EORTC GCG. JCO Vol 24, No 29 (October 10), 2006: pp. 3137-1237     Rayne C, Arlene B, Travis LR, Otf GUZMANV, Sarah A, Cathy SV. Final overall survival and safety analysis of OCEANS, a phase 3 trial of chemotherapy with or without bevacizumab in patients with platinum-sensitive recurrent ovarian cancer. Gynecol Oncol. 2015 Oct;139(1):10-6. Epub 2015 Aug 10     Allergies:  Cisplatin and Codeine    /57   Pulse (!) 57   Temp 36.7 °C (98 °F)   Resp 18   Ht 1.626 m (5' 4.02\")   Wt 65 kg (143 lb 4.8 oz)   SpO2 100%   BMI 24.58 kg/m²  Body surface area is 1.71 meters squared.    ANC~ 750 Plt = 260k   Hgb = 9.5    MD aware of all current lab results. Orders received to proceed with full dose treatment per d/w Dr. Delcid.         Gemcitabine 1000 mg/m2 x 1.71m2 = 1710mg    <5% difference, OK to treat with final dose = 1710 mg IV       Collins Torres, AlmaD      "

## 2017-10-11 NOTE — PROGRESS NOTES
Chemotherapy Verification - SECONDARY RN      Height = 64.02 in  Weight = 143 lb lb  BSA = 1.71 m2       Medication: Gemzar  Dose: 1000 mg/m2  Calculated Dose: 1710 mg                             (In mg/m2, AUC, mg/kg)                                 I confirm this process was performed independently with the BSA and all final chemotherapy dosing calculations congruent.  Any discrepancies of 5% or greater have been addressed with the chemotherapy pharmacist. The resolution of the discrepancy has been documented in the EPIC progress notes.

## 2017-10-11 NOTE — PROGRESS NOTES
Patient presents for day 8 cycle two Gemzar only chemotherapy. Reviewed plan of care, patient verbalizes understanding. Port accessed, blood drawn as ordered. Call placed to Dr. Delcid in regards to ANC of 750, okay to treat received. Gemzar infused with no new patient complaints, line flushed clear. Port flushed per protocol and de-accessed, sterile gauze to site. Patient scheduled for next appointment and released in no acute distress.

## 2017-10-11 NOTE — PROGRESS NOTES
Lab called with critical result of ANC at 750. Critical lab result read back to lab.   Dr. Delcid notified of critical lab result at 1145.  Critical lab result read back by Dr. Delcid. Received okay to proceed with treatment.

## 2017-10-12 PROBLEM — N39.0 RECURRENT UTI: Status: ACTIVE | Noted: 2017-10-12

## 2017-10-12 RX ORDER — ONDANSETRON 8 MG/1
8 TABLET, ORALLY DISINTEGRATING ORAL
Status: CANCELLED | OUTPATIENT
Start: 2017-01-01

## 2017-10-12 RX ORDER — LIDOCAINE HYDROCHLORIDE 10 MG/ML
0.5 INJECTION, SOLUTION INFILTRATION; PERINEURAL SEE ADMIN INSTRUCTIONS
Status: CANCELLED | OUTPATIENT
Start: 2017-01-01

## 2017-10-12 RX ORDER — PROCHLORPERAZINE MALEATE 10 MG
10 TABLET ORAL EVERY 6 HOURS PRN
Status: CANCELLED | OUTPATIENT
Start: 2017-01-01

## 2017-10-12 RX ORDER — SODIUM CHLORIDE 9 MG/ML
INJECTION, SOLUTION INTRAVENOUS CONTINUOUS
Status: CANCELLED | OUTPATIENT
Start: 2017-01-01

## 2017-10-12 RX ORDER — ONDANSETRON 2 MG/ML
4 INJECTION INTRAMUSCULAR; INTRAVENOUS
Status: CANCELLED | OUTPATIENT
Start: 2017-01-01

## 2017-10-24 ENCOUNTER — OUTPATIENT INFUSION SERVICES (OUTPATIENT)
Dept: ONCOLOGY | Facility: MEDICAL CENTER | Age: 77
End: 2017-10-24
Attending: SPECIALIST
Payer: MEDICARE

## 2017-10-24 VITALS
OXYGEN SATURATION: 100 % | BODY MASS INDEX: 25.59 KG/M2 | SYSTOLIC BLOOD PRESSURE: 137 MMHG | WEIGHT: 149.91 LBS | HEART RATE: 58 BPM | RESPIRATION RATE: 18 BRPM | TEMPERATURE: 98 F | HEIGHT: 64 IN | DIASTOLIC BLOOD PRESSURE: 60 MMHG

## 2017-10-24 DIAGNOSIS — C56.9 MALIGNANT NEOPLASM OF OVARY, UNSPECIFIED LATERALITY (HCC): ICD-10-CM

## 2017-10-24 LAB
ALBUMIN SERPL BCP-MCNC: 4 G/DL (ref 3.2–4.9)
ALBUMIN/GLOB SERPL: 1.4 G/DL
ALP SERPL-CCNC: 77 U/L (ref 30–99)
ALT SERPL-CCNC: 35 U/L (ref 2–50)
ANION GAP SERPL CALC-SCNC: 10 MMOL/L (ref 0–11.9)
ANISOCYTOSIS BLD QL SMEAR: ABNORMAL
AST SERPL-CCNC: 45 U/L (ref 12–45)
BASOPHILS # BLD AUTO: 0 % (ref 0–1.8)
BASOPHILS # BLD: 0 K/UL (ref 0–0.12)
BILIRUB SERPL-MCNC: 0.5 MG/DL (ref 0.1–1.5)
BUN SERPL-MCNC: 28 MG/DL (ref 8–22)
CALCIUM SERPL-MCNC: 9.3 MG/DL (ref 8.5–10.5)
CANCER AG125 SERPL-ACNC: 43.5 U/ML (ref 0–35)
CHLORIDE SERPL-SCNC: 100 MMOL/L (ref 96–112)
CO2 SERPL-SCNC: 23 MMOL/L (ref 20–33)
CREAT SERPL-MCNC: 1.42 MG/DL (ref 0.5–1.4)
CREAT UR-MCNC: 61.1 MG/DL
EOSINOPHIL # BLD AUTO: 0.08 K/UL (ref 0–0.51)
EOSINOPHIL NFR BLD: 1.8 % (ref 0–6.9)
ERYTHROCYTE [DISTWIDTH] IN BLOOD BY AUTOMATED COUNT: 56.2 FL (ref 35.9–50)
GFR SERPL CREATININE-BSD FRML MDRD: 36 ML/MIN/1.73 M 2
GLOBULIN SER CALC-MCNC: 2.8 G/DL (ref 1.9–3.5)
GLUCOSE SERPL-MCNC: 99 MG/DL (ref 65–99)
HCT VFR BLD AUTO: 28.6 % (ref 37–47)
HGB BLD-MCNC: 8.8 G/DL (ref 12–16)
LG PLATELETS BLD QL SMEAR: NORMAL
LYMPHOCYTES # BLD AUTO: 1.17 K/UL (ref 1–4.8)
LYMPHOCYTES NFR BLD: 24.8 % (ref 22–41)
MAGNESIUM SERPL-MCNC: 2.5 MG/DL (ref 1.5–2.5)
MANUAL DIFF BLD: NORMAL
MCH RBC QN AUTO: 26.6 PG (ref 27–33)
MCHC RBC AUTO-ENTMCNC: 30.8 G/DL (ref 33.6–35)
MCV RBC AUTO: 86.4 FL (ref 81.4–97.8)
MONOCYTES # BLD AUTO: 0.79 K/UL (ref 0–0.85)
MONOCYTES NFR BLD AUTO: 16.8 % (ref 0–13.4)
MORPHOLOGY BLD-IMP: NORMAL
NEUTROPHILS # BLD AUTO: 2.66 K/UL (ref 2–7.15)
NEUTROPHILS NFR BLD: 56.6 % (ref 44–72)
NRBC # BLD AUTO: 0 K/UL
NRBC BLD AUTO-RTO: 0 /100 WBC
OVALOCYTES BLD QL SMEAR: NORMAL
PLATELET # BLD AUTO: 249 K/UL (ref 164–446)
PLATELET BLD QL SMEAR: NORMAL
PMV BLD AUTO: 9.9 FL (ref 9–12.9)
POIKILOCYTOSIS BLD QL SMEAR: NORMAL
POLYCHROMASIA BLD QL SMEAR: NORMAL
POTASSIUM SERPL-SCNC: 4.2 MMOL/L (ref 3.6–5.5)
PROT SERPL-MCNC: 6.8 G/DL (ref 6–8.2)
PROT UR-MCNC: 63 MG/DL (ref 0–15)
PROT/CREAT UR: 1031 MG/G (ref 10–107)
RBC # BLD AUTO: 3.31 M/UL (ref 4.2–5.4)
RBC BLD AUTO: PRESENT
SODIUM SERPL-SCNC: 133 MMOL/L (ref 135–145)
WBC # BLD AUTO: 4.7 K/UL (ref 4.8–10.8)

## 2017-10-24 PROCEDURE — 84156 ASSAY OF PROTEIN URINE: CPT

## 2017-10-24 PROCEDURE — 700111 HCHG RX REV CODE 636 W/ 250 OVERRIDE (IP)

## 2017-10-24 PROCEDURE — 80053 COMPREHEN METABOLIC PANEL: CPT

## 2017-10-24 PROCEDURE — 85027 COMPLETE CBC AUTOMATED: CPT

## 2017-10-24 PROCEDURE — 36591 DRAW BLOOD OFF VENOUS DEVICE: CPT

## 2017-10-24 PROCEDURE — 83735 ASSAY OF MAGNESIUM: CPT

## 2017-10-24 PROCEDURE — 82570 ASSAY OF URINE CREATININE: CPT

## 2017-10-24 PROCEDURE — 85007 BL SMEAR W/DIFF WBC COUNT: CPT

## 2017-10-24 PROCEDURE — A4212 NON CORING NEEDLE OR STYLET: HCPCS

## 2017-10-24 PROCEDURE — 86304 IMMUNOASSAY TUMOR CA 125: CPT

## 2017-10-24 RX ADMIN — HEPARIN 500 UNITS: 100 SYRINGE at 15:39

## 2017-10-24 ASSESSMENT — PAIN SCALES - GENERAL: PAINLEVEL: NO PAIN

## 2017-10-24 NOTE — PROGRESS NOTES
Pt arrived to IS, ambulatory, for labs prior to chemotherapy. Pt states she is experiencing a lot of fatigue. Port accessed in sterile manner, positive blood return noted. Labs drawn per active order. Port flushed and heparin locked per policy, port left accessed. Urine collected per order. Pt left IS with no s/sx of distress. Follow up appointment confirmed for tomorrow.

## 2017-10-25 NOTE — PROGRESS NOTES
"Pharmacy Chemotherapy Verification    Patient Name: Rosanna Damico  Dx: Ovarian Cancer        Cycle: Cycle 3 Day 1    Previous treatment: 10/11/17    Protocol: CARBOplatin + Gemcitabine + Avastin     Carboplatin (Paraplatin) AUC 4 IV once on day 1  Gemcitabine (Gemzar) 1000 mg/m2 IV once per day on days 1 & 8  Bevacizumab (Avastin) 15 mg/kg IV once on day 1, given first  21-day cycle for 6 to 10 cycles, based on response; after completing these cycles of carboplatin, gemcitabine, and bevacizumab, patients continue on bevacizumab maintenance until progression of disease or unacceptable toxicity   Agustín PHILLIPS et al. Gemcitabine Plus Carboplatin Compared With Carboplatin in Patients With Platinum-Sensitive Recurrent Ovarian Cancer: An Intergroup Trial of the AGO-OVAR, the [facility] CTG, and the EORTC GCG. JCO Vol 24, No 29 (October 10), 2006: pp. 5166-3218   Rayne C, Arlene B, Travis LR, Otf GUZMANV, Sarah A, Cathy SV. Final overall survival and safety analysis of OCEANS, a phase 3 trial of chemotherapy with or without bevacizumab in patients with platinum-sensitive recurrent ovarian cancer. Gynecol Oncol. 2015 Oct;139(1):10-6. Epub 2015 Aug 10     Allergies:  Cisplatin and Codeine    /63   Pulse (!) 59   Temp 36.6 °C (97.8 °F)   Resp 18   Ht 1.626 m (5' 4.02\")   Wt 67.4 kg (148 lb 9.4 oz)   SpO2 99%   BMI 25.49 kg/m²  Body surface area is 1.74 meters squared.    10/24/17:  ANC~ 2660 Plt = 249k   Hgb = 8.8     LFT's = WNL TBili = 0.5     10/25/17:  SCr = 1.23mg/dL CrCl ~ 42mL/min      Ref. Range 10/24/2017 15:45   Protein Creatinine Ratio Latest Ref  Range: 10 - 107 mg/g 1031 (H)       Bevacizumab 15 mg/kg IV x 67.4kg = 1011mg     Rounded to vial size(within 10%) per dose rounding protocol.                         <5% difference, OK to treat with final dose = 1000 mg IV     Gemcitabine 1000 mg/m2 x 1.74m2 = 1740mg    <5% difference, OK to treat with final dose = 1710mg IV    Carboplatin AUC 4 (42 + 25)  = " 268mg on day 1                        >5% difference, continue with baseline dose. KJ Mckinnon/Dr. Bhavin VILA to treat with final dose = 245mg IV        Alma CardozaD

## 2017-10-25 NOTE — PROGRESS NOTES
Chemotherapy Verification - SECONDARY RN       Height = 162.6 cm  Weight = 67.4 kg  BSA = 1.74 m2       Medication: Avastin  Dose: 15 mg/kg  Calculated Dose: 1,011 mg                             (In mg/m2, AUC, mg/kg)     Medication: Gemzar  Dose: 1,000 mg/m2  Calculated Dose: 1,740 mg                             (In mg/m2, AUC, mg/kg)    Medication: Carboplatin  Dose: AUC = 4  Calculated Dose: 258.05 mg                             (In mg/m2, AUC, mg/kg)      Carboplatin calculation (if applicable):  (((98-(0.8*(75-20)))/1.23)*(.8+(1 *.1))+25)*4 * ((1 =1)+(1 =2)) = 258.049 mg    I confirm that this process was performed independently.

## 2017-10-25 NOTE — PROGRESS NOTES
Chemotherapy Verification - PRIMARY RN      Height = 162.6 cm  Weight = 67.4 kg  BSA = 1.74 m2       Medication: Avastin  Dose: Avastin 15 mg/kg  Calculated Dose: 1,011 mg                             (In mg/m2, AUC, mg/kg)     Medication: Gemzar  Dose: 1,000 mg/m2  Calculated Dose: 1,740 mg                             (In mg/m2, AUC, mg/kg)    Medication: Carboplatin  Dose: AUC 4  Calculated Dose: 258.2 mg                             (In mg/m2, AUC, mg/kg)    Carboplatin calculation:(((140-75)*[id number])*(0.70+(1 *0.15))/(1.23*72)+25)*4 * ((1 =1)+(1 =2)) = 258.196       I confirm this process was performed independently with the BSA and all final chemotherapy dosing calculations congruent.  Any discrepancies of 5% or greater have been addressed with the chemotherapy pharmacist. The resolution of the discrepancy has been documented in the EPIC progress notes.

## 2017-10-25 NOTE — PROGRESS NOTES
"Pharmacy chemotherapy verification:    Patient Name: Rosanna Damico  Dx: recurrent ovarian cancer          Protocol: carboplatin/gemcitabine + bevacizumab     *Dosing Reference*  Carboplatin (Paraplatin) AUC 4 IV once on day 1  Gemcitabine (Gemzar) 1000 mg/m2 IV once per day on days 1 & 8  Bevacizumab (Avastin) 15 mg/kg IV once on day 1, given first   21-day cycle for 6 to 10 cycles, based on response; after completing these cycles of carboplatin, gemcitabine, and bevacizumab, patients continue on bevacizumab maintenance until progression of disease or unacceptable toxicity   Rayne SANTIAGO et al- TITA-  J Clin Oncol. 2012 Clif 10; 30(17): 0398-0082.     Allergies:  Cisplatin and Codeine     /63   Pulse (!) 59   Temp 36.6 °C (97.8 °F)   Resp 18   Ht 1.626 m (5' 4.02\")   Wt 67.4 kg (148 lb 9.4 oz)   SpO2 99%   BMI 25.49 kg/m²  Body surface area is 1.74 meters squared.    Labs 10/24/17:  ANC~ 2660 Plt = 249k   Hgb = 8.8     LFT's = WNL  TBili = 0.5  Mag = 2.5  K+ = 4.2  Total protein, urine: 63 Protein creatinine: 1031    UPCR: 0.06 g/g    Labs 10/25/17:   Scr = 1.23  CrCl ~ 42 mL/min (ok to proceed with treatment today per Dr. Villa)     9/6/17 Carboplatin skin test: no reaction     Drug Order   (Drug name, dose, route, IV Fluid & volume, frequency, number of doses) Cycle: C3D1  Previous treatment: C2 D8 = 10/11/17  carboplatin skin test 9/6; 6 cycles Taxol, last 4/4/17     Medication = Gemcitabine  Base Dose= 1000 mg/m2   Calc Dose: Base Dose x 1.74 m2 = 1740 mg  Final Dose = 1710 mg  Route = IV  Fluid & Volume =  mL  Admin Duration = Over 30min          <5% difference, ok to treat with final dose     Medication = Carboplatin  Base Dose= AUC 4  Calc Dose: Base Dose x (42 + 25) = 268 mg  Final Dose = 245 mg  Route = IV  Fluid & Volume = NS 250mL  Admin Duration = Over 30 min          >5% difference, ok to treat with baseline dose KJ Tao/Dr. Delcid     Medication = Bevacizumab  Base " Dose= 15mg/kg  Calc Dose:Base Dose x 67.4 kg = 1011 mg  Final Dose = 1000 mg  Route = IV  Fluid & Volume =  mL  Admin Duration = Over 30 min          <5% difference, rounded to nearest vial size per protocol, ok to treat with final dose           By my signature below, I confirm this process was performed independently with the BSA and all final chemotherapy dosing calculations congruent. I have reviewed the above chemotherapy order and that my calculation of the final dose and BSA (when applicable) corroborate those calculations of the  pharmacist. Discrepancies of 5% or greater in the written dose have been addressed and documented within the EPIC Progress notes.    Lisbet Cid, PharmD

## 2017-10-26 NOTE — PROGRESS NOTES
Patient presents for day one cycle three chemotherapy. Patient to be hydrated and re-check creatinine before proceeding. Patient aware of plan of care. Hydration completed, creatinine down to 1.23. Call placed to MD office, okay to treat obtained. Chemotherapy infused with no new patient complaints, line flushed clear. Port flushed per protocol and de-accessed, sterile gauze to site. Patient scheduled for next appointment and released in no acute distress with her .

## 2017-11-01 NOTE — PROGRESS NOTES
Pt arrives ambulatory to IS accompanied by self.  Pt is here for chemo.  Port accessed in a sterile fashion, brisk blood return noted.  Labs drawn as ordered and results reviewed.    Call placed to MD to discuss NIC=902, orders to proceed as ordered given.  He is also calling in Lasix and Potassium for pt, pt is aware.  Chemo infused as ordered, pt tolerated well, no evidence of adverse effects noted or expressed.  Port flushed per heparin protocol, NCN removed intact, pressure drsg to site.  Pt dc'd to care of spouse in no apparent distress.  Future appts confirmed.

## 2017-11-01 NOTE — PROGRESS NOTES
"Pharmacy Chemotherapy Verification    Patient Name: Rosanna Damico  Dx: Recurrent ovarian cancer          Protocol: Carboplatin/Gemcitabine + Bevacizumab     *Dosing Reference*  Carboplatin (Paraplatin) AUC 4 IV once on day 1  Gemcitabine (Gemzar) 1000 mg/m2 IV once per day on days 1 & 8  Bevacizumab (Avastin) 15 mg/kg IV once on day 1, given first   21-day cycle for 6 to 10 cycles, based on response; after completing these cycles of carboplatin, gemcitabine, and bevacizumab, patients continue on bevacizumab maintenance until progression of disease or unacceptable toxicity   Rayne SANTIAGO et al- TITA-  J Clin Oncol. 2012 Clif 10; 30(17): 4858-2507.     Allergies:  Cisplatin and Codeine     /67   Pulse 78   Temp 36.6 °C (97.8 °F)   Resp 18   Ht 1.626 m (5' 4.02\")   Wt 68.1 kg (150 lb 2.1 oz)   SpO2 99%   BMI 25.76 kg/m²  Body surface area is 1.75 meters squared.    Labs 11/1/17  ANC~ 900 Plt = 263k   Hgb = 8.8     Labs 10/24/17  AST/ALT/AP = 45/35/77  TBili = 0.5  Mag = 2.5  K+ = 4.2  Total protein, urine: 63 Protein creatinine: 1031    UPCR: 0.06 g/g    Labs 10/25/17  SCr = 1.23  CrCl ~ 42 mL/min     9/6/17 Carboplatin skin test: no reaction     Drug Order   (Drug name, dose, route, IV Fluid & volume, frequency, number of doses) Cycle: C3D8  Previous treatment: C3D1 = 10/25/17  Carboplatin skin test 9/6; 6 cycles Taxol, last 4/4/17     Medication = Gemcitabine  Base Dose = 1000 mg/m2   Calc Dose: Base Dose x 1.75 m2 = 1750 mg  Final Dose = 1710 mg  Route = IV  Fluid & Volume =  mL  Admin Duration = Over 30 min          <5% difference, OK to treat with final dose       By my signature below, I confirm this process was performed independently with the BSA and all final chemotherapy dosing calculations congruent. I have reviewed the above chemotherapy order and that my calculation of the final dose and BSA (when applicable) corroborate those calculations of the  pharmacist. " Discrepancies of 5% or greater in the written dose have been addressed and documented within the EPIC Progress notes.    Savana Tejada, AlmaD

## 2017-11-01 NOTE — PROGRESS NOTES
"Pharmacy Chemotherapy Verification    Patient Name: Rosanna Damico  Dx: Ovarian Cancer        Cycle: Cycle 3 Day 8  Previous treatment: 10/25/17    Protocol: CARBOplatin + Gemcitabine + Avastin       Carboplatin (Paraplatin) AUC 4 IV once on day 1  Gemcitabine (Gemzar) 1000 mg/m2 IV once per day on days 1 & 8  Bevacizumab (Avastin) 15 mg/kg IV once on day 1, given first    *Dosing Reference*  Agustín PHILLIPS et al. Gemcitabine Plus Carboplatin Compared With Carboplatin in Patients With Platinum-Sensitive Recurrent Ovarian Cancer: An Intergroup Trial of the AGO-OVAR, the [facility] CTG, and the EORTC GCG. JCO Vol 24, No 29 (October 10), : pp. 3842-2377     Rayne C, Arlene B, Travis LR, Otf GUZMANV, Sarah A, Cathy SV. Final overall survival and safety analysis of OCEANS, a phase 3 trial of chemotherapy with or without bevacizumab in patients with platinum-sensitive recurrent ovarian cancer. Gynecol Oncol. 2015 Oct;139(1):10-6. Epub 2015 Aug 10     Allergies:  Cisplatin and Codeine    /67   Pulse 78   Temp 36.6 °C (97.8 °F)   Resp 18   Ht 1.626 m (5' 4.02\")   Wt 68.1 kg (150 lb 2.1 oz)   SpO2 99%   BMI 25.76 kg/m²  Body surface area is 1.75 meters squared.    LABS 2017:  ANC: 900      WBC: 2.0     Plt: 263k   Hgb/Hct: 8.8/27.8       LABS 10/25/2017:  SCr: 1.23 mg/dL CrCl: 40 mL/min    LABS 10/24/2017 :     Ma.5   K: 4.2  Na: 133          Glu: 99  LFT's: 45/35/77 TBili = 0.5     Gemcitabine 1000 mg/m2 x 1.75m2 = 1750mg    <5% difference, OK to treat with final dose = 1710 mg IV       JESUS Palencia, PharmD      "

## 2017-11-01 NOTE — PROGRESS NOTES
Chemotherapy Verification - SECONDARY RN       Height = 162.6cm  Weight = 68.1kg  BSA = 1.75m2       Medication: Gemzar  Dose: 1000mg/m2  Calculated Dose: 1750mg                             (In mg/m2, AUC, mg/kg)       I confirm that this process was performed independently.

## 2017-11-01 NOTE — PROGRESS NOTES
Chemotherapy Verification - PRIMARY RN      Height = 162cm  Weight = 68.1kg  BSA = 1.75m2       Medication: gemzar  Dose: 1000mg/m2  Calculated Dose: 1750mg      I confirm this process was performed independently with the BSA and all final chemotherapy dosing calculations congruent.  Any discrepancies of 5% or greater have been addressed with the chemotherapy pharmacist. The resolution of the discrepancy has been documented in the EPIC progress notes.

## 2017-11-15 NOTE — PROGRESS NOTES
"Pharmacy Chemotherapy Verification  TREATMENT DEFERRED FOR FURTHER EVALUATION   --elevated Scr and urine protein    Patient Name: Rosanna Damico  Dx: Ovarian Cancer           Previous treatment: 11/1/17    Protocol: CARBOplatin + Gemcitabine + Avastin     Carboplatin (Paraplatin) AUC 4 IV once on day 1  Gemcitabine (Gemzar) 1000 mg/m2 IV once per day on days 1 & 8  Bevacizumab (Avastin) 15 mg/kg IV once on day 1, given first  21-day cycle for 6 to 10 cycles, based on response; after completing these cycles of carboplatin, gemcitabine, and bevacizumab, patients continue on bevacizumab maintenance until progression of disease or unacceptable toxicity   Agustín PHILLIPS et al. Gemcitabine Plus Carboplatin Compared With Carboplatin in Patients With Platinum-Sensitive Recurrent Ovarian Cancer: An Intergroup Trial of the AGO-OVAR, the [facility] CTG, and the EORTC GCG. JCO Vol 24, No 29 (October 10), 2006: pp. 7051-8475   Rayne C, Arlene B, Travis LR, Otf GUZMANV, Sarah A, Cathy SV. Final overall survival and safety analysis of OCEANS, a phase 3 trial of chemotherapy with or without bevacizumab in patients with platinum-sensitive recurrent ovarian cancer. Gynecol Oncol. 2015 Oct;139(1):10-6. Epub 2015 Aug 10     Allergies:  Cisplatin and Codeine    /54   Pulse 71   Temp 36.2 °C (97.1 °F)   Resp 18   Ht 1.626 m (5' 4.02\")   Wt 68.4 kg (150 lb 12.7 oz)   SpO2 100%   BMI 25.87 kg/m²  Body surface area is 1.76 meters squared.    11/14/17:  ANC~ 2220 Plt = 182k   Hgb = 8.7     SCr = 1.62mg/dL CrCl ~ 32mL/min   LFT's = WNL TBili = 0.5    Mag = 2.5      Ref. Range 11/14/2017 15:30   Protein Creatinine Ratio Latest Ref Range: 10 - 107 mg/g 2295 (H)      Ref. Range 11/14/2017 15:30   Protein Latest Ref Range: Negative mg/dL 300 (A)       = 0mg IV      = 0mg IV     = 0mg IV        Collins Torres PharmD      "

## 2017-11-16 NOTE — PROGRESS NOTES
Pt presents to IS for cycle 4 of Carbo/Gemzar/Avastin after MD appointment this AM.  She reports that MD states that she needs hydration prior to chemotherapy infusion, no orders seen for hydration.  Call placed to Dr. Shah's office.  Spoke with MD, orders received to defer treatment today and proceed with 2 L NS hydration followed by repeat BMP.  R chest port in place, previously accessed; brisk blood return noted. Pt tolerated hydration well. Repeat BMP completed, creatinine level improved.  With help of TD Seymour, future chemotherapy schedule clarified.  Pt agrees to obtain labs at [facility] [place] [facility] prior to next cycle (c5) of chemotherapy.  She will also see MD prior to infusion appointment.  Pt verbalizes agreement with plan of care.  R chest port de-accessed, site covered with sterile gauze and tape.  Pt discharged from IS in NAD under self care.     1600 Called patient to report repeat BMP results, pt relieved with improved creatinine.  She will return Saturday for chemo.

## 2017-11-18 NOTE — PROGRESS NOTES
Chemotherapy Verification - PRIMARY RN      Height = 163 cm  Weight = 71.2 kg  BSA = 1.8 m2       Medication: Gemcitabine  Dose: 1000 mg/m2  Calculated Dose: 1800 mg                             (In mg/m2, AUC, mg/kg)     Carboplatin calculation (if applicable): (((140-75)*[id number])*(0.70+(1 *0.15))/(1.14*72)+25)*4 * ((1 =1)+(1 =2)) = 291.706    Holding Avastin per Dr. Delcid.     I confirm this process was performed independently with the BSA and all final chemotherapy dosing calculations congruent.  Any discrepancies of 5% or greater have been addressed with the chemotherapy pharmacist. The resolution of the discrepancy has been documented in the EPIC progress notes.

## 2017-11-18 NOTE — PROGRESS NOTES
"Pharmacy chemotherapy verification:    Patient Name: Rosanna Damico  Dx: recurrent ovarian cancer          Protocol: carboplatin/gemcitabine + bevacizumab     *Dosing Reference*  Carboplatin (Paraplatin) AUC 4 IV once on day 1  Gemcitabine (Gemzar) 1000 mg/m2 IV once per day on days 1 & 8  Bevacizumab (Avastin) 15 mg/kg IV once on day 1, given first Avastin HELD C4D1 due to proteinuria  21-day cycle for 6 to 10 cycles, based on response; after completing these cycles of carboplatin, gemcitabine, and bevacizumab, patients continue on bevacizumab maintenance until progression of disease or unacceptable toxicity   Rayne SANTIAGO et al- TITA-  J Clin Oncol. 2012 Clif 10; 30(17): 7766-0843.     Allergies:  Cisplatin and Codeine     BP (!) 161/76   Pulse 73   Temp 36.6 °C (97.8 °F)   Resp 18   Ht 1.63 m (5' 4.17\")   Wt 71.2 kg (156 lb 15.5 oz)   SpO2 97%   BMI 26.80 kg/m²  Body surface area is 1.8 meters squared.    Labs 11/18/17:  ANC~ 2680 Plt = 254k   Hgb = 9.1   SCr = 1.14mg/dL CrCl ~ 48 mL/min   LFT's = WNL TBili = 0.5    Mag = 2.4  K+ = 4.8  Total protein, urine: 671.3 mg/dL Protein creatinine: 129.90 mg/dL  UPCR: 5.168g/g  Urine protein: 300 (~ 3+) - Hold bevacizumab today 11/18/17 11/14/17: urine culture: (+) c. albicans Dr. Delcid calling in fluconazole rx today 11/18/17 9/6/17 Carboplatin skin test: no reaction     Drug Order   (Drug name, dose, route, IV Fluid & volume, frequency, number of doses) Cycle: C4D1  Previous treatment: C3 D8 = 11/1/17  carboplatin skin test 9/6; 6 cycles Taxol, last 4/4/17     Medication = Gemcitabine  Base Dose= 1000 mg/m2   Calc Dose: Base Dose x 1.8 m2 = 1800mg  Final Dose = 1710 mg  Route = IV  Fluid & Volume =  mL  Admin Duration = Over 30min          = 5% difference, ok to treat with baseline dose per Dr. Delcid      Medication = Carboplatin  Base Dose= AUC 4  Calc Dose: Base Dose x (48 + 25) = 292 mg  Final Dose = 245 mg  Route = IV  Fluid & Volume = NS " 250mL  Admin Duration = Over 30 min          >5% difference, ok to treat with baseline dose per Dr. Delcid     0 mg         Hold for proteinuria            By my signature below, I confirm this process was performed independently with the BSA and all final chemotherapy dosing calculations congruent. I have reviewed the above chemotherapy order and that my calculation of the final dose and BSA (when applicable) corroborate those calculations of the  pharmacist. Discrepancies of 5% or greater in the written dose have been addressed and documented within the EPIC Progress notes.    Lisbet Cid, PharmD

## 2017-11-18 NOTE — PROGRESS NOTES
"Pharmacy Chemotherapy Verification    Patient Name: Rosanna Damico  Dx: Ovarian Cancer        Cycle: Cycle 4 Day 1  delayed due to SCr and proteinuria    Previous treatment: 11/1/17 C3D8    Protocol: CARBOplatin + Gemcitabine + Avastin     Carboplatin (Paraplatin) AUC 4 IV once on day 1  Gemcitabine (Gemzar) 1000 mg/m2 IV once per day on days 1 & 8  Bevacizumab (Avastin) 15 mg/kg IV once on day 1, given first  Avastin HELD C4D1 due to proteinuria  21-day cycle for 6 to 10 cycles, based on response; after completing these cycles of carboplatin, gemcitabine, and bevacizumab, patients continue on bevacizumab maintenance until progression of disease or unacceptable toxicity   Agustín PHILLIPS et al. Gemcitabine Plus Carboplatin Compared With Carboplatin in Patients With Platinum-Sensitive Recurrent Ovarian Cancer: An Intergroup Trial of the AGO-OVAR, the [facility] CTG, and the EORTC GCG. JCO Vol 24, No 29 (October 10), 2006: pp. 5956-9837   Rayne C, Arlene B, Travis LR, Otf GUZMANV, Sarah A, Cathy SV. Final overall survival and safety analysis of OCEANS, a phase 3 trial of chemotherapy with or without bevacizumab in patients with platinum-sensitive recurrent ovarian cancer. Gynecol Oncol. 2015 Oct;139(1):10-6. Epub 2015 Aug 10     Allergies:  Cisplatin and Codeine    /59   Pulse 65   Temp 36.6 °C (97.8 °F)   Resp 18   Ht 1.63 m (5' 4.17\")   Wt 71.2 kg (156 lb 15.5 oz)   SpO2 97%   BMI 26.80 kg/m²  Body surface area is 1.8 meters squared.    11/18/17:  ANC~ 2680 Plt = 254k   Hgb = 9.1  SCr = 1.14 mg/dL CrCl ~ 48 mL/min   LFT's = WNL TBili = 0.5      Mag = 2.4 K = 4.8    Urine: Creatinine = 129.9 mg/dL Total protein = 671 mg/dL UPCR = 5168 mg/g  Urinalysis: Protein 300 mg/dL   WBC: packed WBC  11/14/17 Urine C&S: 10-50K Candida albicans>>MD to order fluconazole home Rx      Gemcitabine 1000 mg/m2 x 1.80 m2 = 1800 mg    >5% difference, \"do not adjust for current weight\" per Severiano NICOLE   OK to treat with final dose = " "1710 mg IV    Carboplatin AUC 4 (48 + 25)  = 292 mg    >5% difference, \"do not adjust for current renal function\" per Bhavin NICOLE   OK to treat with final dose = 245 mg IV      Jj Hugo, AlmaD      "

## 2017-11-18 NOTE — PROGRESS NOTES
Chemotherapy Verification - SECONDARY RN       Height = 163cm  Weight = 71.2kg  BSA = 1.8m2       Medication: Gemzar  Dose: 1000mg/m2  Calculated Dose: 1800mg                             (In mg/m2, AUC, mg/kg)     Medication: Caarboplatin  Dose: AUC 4 Calculated Dose: 291.706mg > 5% diff  (245mg ordered per MD - not to change the dose per MD)                                Carboplatin calculation (if applicable): (140-75)*[id number])*(0.70+(1 *0.15))/(1.14*72)+25)*4 * ((1 =1)+(1 =2)) = 291.706    I confirm that this process was performed independently.

## 2017-11-18 NOTE — PROGRESS NOTES
"Patient arrived to infusion center for chemotherapy. No new complaints today except after the 2L NS she received on 11/14/17 she felt really \"swollen all over.\" We discussed reasons for drawing blood and collecting her urine sample again. We had a long discussion about the kidneys related to her labs. Port-a-cath accessed with positive blood return and labs drawn. Urine sample sent to lab. The lab took two hours to report on the CMP and protein/creatine urine ratio despite multiple phone calls. Jaya in pharmacy called Dr. Delcid after results came back to discuss the increase proteinuria and the culture results from the urine sample on 11/14/17. Dr. Delcid decided to hold Avastin today due to the proteinuria. He also called in a prescription for her UTI to the [facility]. Zofran given as premedication. Gemzar and Carboplatin infused without difficulty. Patient tolerated treatment well. Port-a-cath de-accessed and flushed saline and heparin. Patient confirms next appointment for 11/25/17. Patient discharged to home with self care.    "

## 2017-11-19 PROBLEM — N17.9 ACUTE KIDNEY INJURY (HCC): Status: ACTIVE | Noted: 2017-01-01

## 2017-11-19 PROBLEM — E86.0 DEHYDRATION: Status: ACTIVE | Noted: 2017-01-01

## 2017-11-19 PROBLEM — R53.83 FATIGUE: Status: ACTIVE | Noted: 2017-01-01

## 2017-11-25 NOTE — PROGRESS NOTES
Pt returns to infusion center for Cycle 4, Day 8 chemotherapy.  Port accessed in sterile fashion, CBC drawn as ordered.  Results reviewed; meets parameters for treatment today.  Pt tolerated infusion without incident.  Port flushed with saline and heparin per policy, de-accessed, gauze dressing placed.  Pt left infusion center ambulatory and in good condition.  Returns in three weeks, appointment scheduled.

## 2017-11-25 NOTE — PROGRESS NOTES
Chemotherapy Verification - PRIMARY RN      Height = 163cm  Weight = 68.9kg  BSA = 1.77m2       Medication: Gemzar  Dose: 1000mg/m2  Calculated Dose: 1770mg                             (In mg/m2, AUC, mg/kg)       I confirm this process was performed independently with the BSA and all final chemotherapy dosing calculations congruent.  Any discrepancies of 5% or greater have been addressed with the chemotherapy pharmacist. The resolution of the discrepancy has been documented in the EPIC progress notes.

## 2017-11-25 NOTE — PROGRESS NOTES
"Pharmacy Chemotherapy Verification    Patient Name: Rosanna Damico  Dx: Ovarian Cancer        Cycle: Cycle 4 Day 8      Previous treatment: C4D1 (11/18/2017)    Protocol: CARBOplatin + Gemcitabine + Avastin       Carboplatin (Paraplatin) AUC 4 IV once on day 1  Gemcitabine (Gemzar) 1000 mg/m2 IV once per day on days 1 & 8  Bevacizumab (Avastin) 15 mg/kg IV once on day 1, given first  (Avastin HELD C4D1 due to proteinuria)    21-day cycle for 6 to 10 cycles, based on response    After completing these cycles of carboplatin, gemcitabine, and bevacizumab, patients continue on bevacizumab maintenance until progression of disease or unacceptable toxicity.    Agustín PHILLIPS et al. Gemcitabine Plus Carboplatin Compared With Carboplatin in Patients With Platinum-Sensitive Recurrent Ovarian Cancer: An Intergroup Trial of the AGO-OVAR, the [facility] CTG, and the EORTC GCG. JCO Vol 24, No 29 (October 10), 2006: pp. 7945-3485   Rayne C, Arlene B, Travis LR, Otf GUZMANV, Sarah A, Cathy SV. Final overall survival and safety analysis of OCEANS, a phase 3 trial of chemotherapy with or without bevacizumab in patients with platinum-sensitive recurrent ovarian cancer. Gynecol Oncol. 2015 Oct;139(1):10-6. Epub 2015 Aug 10     Allergies:  Cisplatin and Codeine    BP (!) 167/68   Pulse 77   Temp 36.5 °C (97.7 °F)   Resp 18   Ht 1.63 m (5' 4.17\")   Wt 68.9 kg (151 lb 14.4 oz)   SpO2 98%   BMI 25.93 kg/m²  Body surface area is 1.77 meters squared.    LABS 11/25/17:  ANC~ 1360 Plt = 186k   Hgb/Hct = 9.2/28.8    LABS 11/18/2017:  SCr = 1.14 mg/dL CrCl ~ 48 mL/min   LFT's = WNL TBili = 0.5      Mag = 2.4 K = 4.8  Urine: Creatinine = 129.9 mg/dL Total protein = 671 mg/dL UPCR = 5168 mg/g  Urinalysis: Protein 300 mg/dL   WBC: packed WBC  11/14/17 Urine C&S: 10-50K Candida albicans>>MD to order fluconazole home Rx    Gemcitabine 1000 mg/m2 x 1.77 m2 = 1770 mg    <5% difference, OK to treat with final dose = 1710 mg IV     JESUS Palencia, " PharmD

## 2017-11-25 NOTE — PROGRESS NOTES
"Pharmacy chemotherapy verification:    Patient Name: Rosanna Damico  Dx: recurrent ovarian cancer          Protocol: carboplatin/gemcitabine + bevacizumab     *Dosing Reference*  Carboplatin (Paraplatin) AUC 4 IV once on day 1  Gemcitabine (Gemzar) 1000 mg/m2 IV once per day on days 1 & 8  Bevacizumab (Avastin) 15 mg/kg IV once on day 1, given first - Avastin HELD C4D1 due to proteinuria  21-day cycle for 6 to 10 cycles, based on response; after completing these cycles of carboplatin, gemcitabine, and bevacizumab, patients continue on bevacizumab maintenance until progression of disease or unacceptable toxicity   marilu Jean-Baptiste- TITA-  J Clin Oncol. 2012 Clif 10; 30(17): 7848-8725.     Allergies:  Cisplatin and Codeine     BP (!) 167/68   Pulse 77   Temp 36.5 °C (97.7 °F)   Resp 18   Ht 1.63 m (5' 4.17\")   Wt 68.9 kg (151 lb 14.4 oz)   SpO2 98%   BMI 25.93 kg/m²  Body surface area is 1.77 meters squared.    Labs 11/25/17:  ANC~ 1360 Plt = 186 k   Hgb = 9.2      Labs 11/18/17:  SCr = 1.14mg/dL CrCl ~ 48 mL/min     LFT's = WNL TBili = 0.5    Mag = 2.4  K+ = 4.8  Total protein, urine: 671.3 mg/dL Protein creatinine: 129.90 mg/dL  UPCR: 5.168g/g  Urine protein: 300 (~ 3+) - Hold bevacizumab today 11/18/17 11/14/17: urine culture: (+) c. albicans Dr. Delcid ordered fluconazole rx 11/18/17 9/6/17 Carboplatin skin test: no reaction     Drug Order   (Drug name, dose, route, IV Fluid & volume, frequency, number of doses) Cycle: C4D8  Previous treatment: C4 D1 = 11/18/17  carboplatin skin test 9/6; 6 cycles Taxol, last 4/4/17     Medication = Gemcitabine  Base Dose= 1000 mg/m2   Calc Dose: Base Dose x 1.77 m2 = 1770mg  Final Dose = 1710 mg  Route = IV  Fluid & Volume =  mL  Admin Duration = Over 30min          < 5% difference, ok to treat with baseline dose per Dr. Delcid        By my signature below, I confirm this process was performed independently with the BSA and all final chemotherapy dosing " calculations congruent. I have reviewed the above chemotherapy order and that my calculation of the final dose and BSA (when applicable) corroborate those calculations of the  pharmacist. Discrepancies of 5% or greater in the written dose have been addressed and documented within the EPIC Progress notes.    Lisbet Cid, PharmD

## 2017-11-25 NOTE — PROGRESS NOTES
Chemotherapy Verification - SECONDARY RN       Height = 64.17in  Weight = 68.9kg  BSA = 1.76m2       Medication: Gemzar  Dose: 1000mg/m2  Calculated Dose: 1760mg (within 5%)                             (In mg/m2, AUC, mg/kg)     DAY 8  Labs reviewed.     I confirm that this process was performed independently.

## 2017-12-13 NOTE — PROGRESS NOTES
"Patient seen today for labs and hydration.  Plan of care discussed. Patient expresses desire not to continue chemotherapy stating \"I can't do this\". She is not feeling well and wants to discuss her treatment plan with Dr. Delcid at her appointment with him scheduled for tomorrow morning.  Emotional support provided.  We discussed leaving her port accessed until after her appointment with Dr. Delcid. Port accessed using sterile technique.  Blood drawn for labs ordered and urine obtained for UA and UPCR.  Patient discharged ambulatory. Returns in AM.   "

## 2017-12-13 NOTE — PROGRESS NOTES
"Pt arrived to [facility], ambulatory, for chemotherapy. Pt states upon arrival, \"you are just going to de-access my port today. I am not doing chemo.\" Pt states that she had a discussion with Dr. Delcid and that she has decided that this regimen is too hard on her body. She stated that Dr. Delcid would like her to get another scan in January and re-evaluate the plan at that time. Emotional support provided to patient. Port de-accessed. Pt left [facility] with no s/sx of distress. Follow up appointment to be determined by MD.  "

## 2017-12-15 NOTE — PROGRESS NOTES
Port accessed per protocol with port access kit at 14:15    Port de-accessed at 14:35 per protocol. Heparin 500units/5ml given with de-access. Site cleaned and dressing applied.     No problems or complications with access and de-access.

## 2017-12-20 PROBLEM — B34.9 PHARYNGITIS WITH VIRAL SYNDROME: Status: ACTIVE | Noted: 2017-01-01

## 2017-12-20 PROBLEM — N19 RENAL FAILURE: Status: ACTIVE | Noted: 2017-01-01

## 2017-12-20 PROBLEM — J02.9 PHARYNGITIS WITH VIRAL SYNDROME: Status: ACTIVE | Noted: 2017-01-01

## 2017-12-20 PROBLEM — N18.9 CHRONIC KIDNEY DISEASE: Status: ACTIVE | Noted: 2017-01-01

## 2017-12-21 PROBLEM — J02.9 PHARYNGITIS WITH VIRAL SYNDROME: Status: RESOLVED | Noted: 2017-01-01 | Resolved: 2017-01-01

## 2017-12-21 PROBLEM — B34.9 PHARYNGITIS WITH VIRAL SYNDROME: Status: RESOLVED | Noted: 2017-01-01 | Resolved: 2017-01-01

## 2018-01-01 ENCOUNTER — OFFICE VISIT (OUTPATIENT)
Dept: CARDIOLOGY | Facility: PHYSICIAN GROUP | Age: 78
End: 2018-01-01
Payer: MEDICARE

## 2018-01-01 ENCOUNTER — APPOINTMENT (OUTPATIENT)
Dept: RADIOLOGY | Facility: MEDICAL CENTER | Age: 78
DRG: 699 | End: 2018-01-01
Attending: SPECIALIST
Payer: MEDICARE

## 2018-01-01 ENCOUNTER — TELEPHONE (OUTPATIENT)
Dept: CARDIOLOGY | Facility: MEDICAL CENTER | Age: 78
End: 2018-01-01

## 2018-01-01 ENCOUNTER — OUTPATIENT INFUSION SERVICES (OUTPATIENT)
Dept: ONCOLOGY | Facility: MEDICAL CENTER | Age: 78
End: 2018-01-01
Attending: SPECIALIST
Payer: MEDICARE

## 2018-01-01 ENCOUNTER — HOSPITAL ENCOUNTER (OUTPATIENT)
Dept: LAB | Facility: MEDICAL CENTER | Age: 78
End: 2018-03-06
Attending: SPECIALIST
Payer: MEDICARE

## 2018-01-01 ENCOUNTER — HOSPITAL ENCOUNTER (OUTPATIENT)
Facility: MEDICAL CENTER | Age: 78
DRG: 699 | End: 2018-06-20
Admitting: SPECIALIST
Payer: MEDICARE

## 2018-01-01 ENCOUNTER — HOSPITAL ENCOUNTER (INPATIENT)
Facility: MEDICAL CENTER | Age: 78
LOS: 1 days | DRG: 699 | End: 2018-06-22
Attending: SPECIALIST | Admitting: SPECIALIST
Payer: MEDICARE

## 2018-01-01 ENCOUNTER — APPOINTMENT (OUTPATIENT)
Dept: RADIOLOGY | Facility: MEDICAL CENTER | Age: 78
End: 2018-01-01
Attending: SPECIALIST
Payer: MEDICARE

## 2018-01-01 ENCOUNTER — TELEPHONE (OUTPATIENT)
Dept: HEALTH INFORMATION MANAGEMENT | Facility: OTHER | Age: 78
End: 2018-01-01

## 2018-01-01 ENCOUNTER — HOSPITAL ENCOUNTER (OUTPATIENT)
Facility: MEDICAL CENTER | Age: 78
End: 2018-01-24
Attending: INTERNAL MEDICINE
Payer: MEDICARE

## 2018-01-01 ENCOUNTER — HOSPITAL ENCOUNTER (OUTPATIENT)
Dept: RADIOLOGY | Facility: MEDICAL CENTER | Age: 78
End: 2018-05-06
Attending: SPECIALIST
Payer: MEDICARE

## 2018-01-01 ENCOUNTER — HOSPITAL ENCOUNTER (OUTPATIENT)
Dept: RADIOLOGY | Facility: MEDICAL CENTER | Age: 78
End: 2018-06-27

## 2018-01-01 ENCOUNTER — HOSPITAL ENCOUNTER (OUTPATIENT)
Facility: MEDICAL CENTER | Age: 78
End: 2018-03-08
Attending: SPECIALIST | Admitting: SPECIALIST
Payer: MEDICARE

## 2018-01-01 VITALS
BODY MASS INDEX: 23 KG/M2 | WEIGHT: 134.7 LBS | OXYGEN SATURATION: 97 % | HEART RATE: 74 BPM | RESPIRATION RATE: 18 BRPM | TEMPERATURE: 97.5 F | HEIGHT: 64 IN | SYSTOLIC BLOOD PRESSURE: 121 MMHG | DIASTOLIC BLOOD PRESSURE: 58 MMHG

## 2018-01-01 VITALS
WEIGHT: 149.25 LBS | HEART RATE: 61 BPM | RESPIRATION RATE: 18 BRPM | SYSTOLIC BLOOD PRESSURE: 139 MMHG | OXYGEN SATURATION: 99 % | DIASTOLIC BLOOD PRESSURE: 59 MMHG | TEMPERATURE: 97.5 F | BODY MASS INDEX: 25.48 KG/M2 | HEIGHT: 64 IN

## 2018-01-01 VITALS
SYSTOLIC BLOOD PRESSURE: 153 MMHG | WEIGHT: 133.6 LBS | OXYGEN SATURATION: 95 % | TEMPERATURE: 97.4 F | HEIGHT: 64 IN | DIASTOLIC BLOOD PRESSURE: 67 MMHG | RESPIRATION RATE: 18 BRPM | HEART RATE: 57 BPM | BODY MASS INDEX: 22.81 KG/M2

## 2018-01-01 VITALS
DIASTOLIC BLOOD PRESSURE: 63 MMHG | RESPIRATION RATE: 18 BRPM | BODY MASS INDEX: 24.92 KG/M2 | SYSTOLIC BLOOD PRESSURE: 153 MMHG | HEART RATE: 69 BPM | TEMPERATURE: 97.8 F | OXYGEN SATURATION: 98 % | HEIGHT: 64 IN | WEIGHT: 145.94 LBS

## 2018-01-01 VITALS
BODY MASS INDEX: 26.08 KG/M2 | TEMPERATURE: 98.6 F | WEIGHT: 152.78 LBS | HEIGHT: 64 IN | SYSTOLIC BLOOD PRESSURE: 147 MMHG | HEART RATE: 68 BPM | RESPIRATION RATE: 18 BRPM | DIASTOLIC BLOOD PRESSURE: 59 MMHG | OXYGEN SATURATION: 98 %

## 2018-01-01 VITALS
BODY MASS INDEX: 26.08 KG/M2 | DIASTOLIC BLOOD PRESSURE: 60 MMHG | OXYGEN SATURATION: 98 % | SYSTOLIC BLOOD PRESSURE: 159 MMHG | TEMPERATURE: 97.5 F | RESPIRATION RATE: 18 BRPM | WEIGHT: 152.78 LBS | HEIGHT: 64 IN | HEART RATE: 75 BPM

## 2018-01-01 VITALS
OXYGEN SATURATION: 92 % | BODY MASS INDEX: 24.92 KG/M2 | HEIGHT: 64 IN | WEIGHT: 146 LBS | SYSTOLIC BLOOD PRESSURE: 132 MMHG | DIASTOLIC BLOOD PRESSURE: 60 MMHG | HEART RATE: 61 BPM

## 2018-01-01 VITALS
RESPIRATION RATE: 18 BRPM | OXYGEN SATURATION: 99 % | SYSTOLIC BLOOD PRESSURE: 145 MMHG | HEART RATE: 56 BPM | TEMPERATURE: 97.5 F | DIASTOLIC BLOOD PRESSURE: 66 MMHG | HEIGHT: 64 IN | BODY MASS INDEX: 24.58 KG/M2 | WEIGHT: 143.96 LBS

## 2018-01-01 VITALS
OXYGEN SATURATION: 97 % | WEIGHT: 133 LBS | BODY MASS INDEX: 22.71 KG/M2 | HEART RATE: 60 BPM | SYSTOLIC BLOOD PRESSURE: 200 MMHG | DIASTOLIC BLOOD PRESSURE: 80 MMHG | HEIGHT: 64 IN

## 2018-01-01 DIAGNOSIS — I10 ESSENTIAL HYPERTENSION: ICD-10-CM

## 2018-01-01 DIAGNOSIS — C56.9 MALIGNANT NEOPLASM OF OVARY, UNSPECIFIED LATERALITY (HCC): ICD-10-CM

## 2018-01-01 DIAGNOSIS — E86.0 DEHYDRATION: ICD-10-CM

## 2018-01-01 DIAGNOSIS — N30.00 ACUTE CYSTITIS WITHOUT HEMATURIA: ICD-10-CM

## 2018-01-01 DIAGNOSIS — E83.42 HYPOMAGNESEMIA: ICD-10-CM

## 2018-01-01 DIAGNOSIS — N17.9 ACUTE KIDNEY INJURY (HCC): ICD-10-CM

## 2018-01-01 DIAGNOSIS — I51.7 LVH (LEFT VENTRICULAR HYPERTROPHY): ICD-10-CM

## 2018-01-01 DIAGNOSIS — Z51.11 ENCOUNTER FOR ANTINEOPLASTIC CHEMOTHERAPY: ICD-10-CM

## 2018-01-01 DIAGNOSIS — I15.0 RENOVASCULAR HYPERTENSION: Primary | ICD-10-CM

## 2018-01-01 DIAGNOSIS — E87.5 HYPERKALEMIA: ICD-10-CM

## 2018-01-01 DIAGNOSIS — N13.5 URETERAL OBSTRUCTION: ICD-10-CM

## 2018-01-01 DIAGNOSIS — R53.0 NEOPLASTIC MALIGNANT RELATED FATIGUE: ICD-10-CM

## 2018-01-01 DIAGNOSIS — I10 ESSENTIAL HYPERTENSION, MALIGNANT: ICD-10-CM

## 2018-01-01 LAB
ALBUMIN SERPL BCP-MCNC: 2.7 G/DL (ref 3.2–4.9)
ALBUMIN SERPL BCP-MCNC: 3.1 G/DL (ref 3.2–4.9)
ALBUMIN SERPL BCP-MCNC: 3.5 G/DL (ref 3.2–4.9)
ALBUMIN SERPL BCP-MCNC: 3.5 G/DL (ref 3.2–4.9)
ALBUMIN/GLOB SERPL: 0.8 G/DL
ALBUMIN/GLOB SERPL: 0.8 G/DL
ALBUMIN/GLOB SERPL: 1 G/DL
ALBUMIN/GLOB SERPL: 1.1 G/DL
ALP SERPL-CCNC: 104 U/L (ref 30–99)
ALP SERPL-CCNC: 62 U/L (ref 30–99)
ALP SERPL-CCNC: 76 U/L (ref 30–99)
ALP SERPL-CCNC: 94 U/L (ref 30–99)
ALT SERPL-CCNC: 10 U/L (ref 2–50)
ALT SERPL-CCNC: 13 U/L (ref 2–50)
ALT SERPL-CCNC: 16 U/L (ref 2–50)
ALT SERPL-CCNC: 20 U/L (ref 2–50)
ANION GAP SERPL CALC-SCNC: 11 MMOL/L (ref 0–11.9)
ANION GAP SERPL CALC-SCNC: 11 MMOL/L (ref 0–11.9)
ANION GAP SERPL CALC-SCNC: 6 MMOL/L (ref 0–11.9)
ANION GAP SERPL CALC-SCNC: 7 MMOL/L (ref 0–11.9)
ANION GAP SERPL CALC-SCNC: 9 MMOL/L (ref 0–11.9)
ANION GAP SERPL CALC-SCNC: 9 MMOL/L (ref 0–11.9)
ANISOCYTOSIS BLD QL SMEAR: ABNORMAL
APPEARANCE UR: ABNORMAL
APTT PPP: 29.8 SEC (ref 24.7–36)
AST SERPL-CCNC: 20 U/L (ref 12–45)
AST SERPL-CCNC: 21 U/L (ref 12–45)
AST SERPL-CCNC: 22 U/L (ref 12–45)
AST SERPL-CCNC: 24 U/L (ref 12–45)
BACTERIA #/AREA URNS HPF: ABNORMAL /HPF
BACTERIA SPEC ANAEROBE CULT: ABNORMAL
BACTERIA SPEC ANAEROBE CULT: ABNORMAL
BACTERIA UR CULT: ABNORMAL
BACTERIA UR CULT: ABNORMAL
BASOPHILS # BLD AUTO: 0.5 % (ref 0–1.8)
BASOPHILS # BLD AUTO: 0.6 % (ref 0–1.8)
BASOPHILS # BLD AUTO: 0.9 % (ref 0–1.8)
BASOPHILS # BLD AUTO: 1.7 % (ref 0–1.8)
BASOPHILS # BLD: 0.03 K/UL (ref 0–0.12)
BASOPHILS # BLD: 0.03 K/UL (ref 0–0.12)
BASOPHILS # BLD: 0.05 K/UL (ref 0–0.12)
BASOPHILS # BLD: 0.05 K/UL (ref 0–0.12)
BILIRUB SERPL-MCNC: 0.3 MG/DL (ref 0.1–1.5)
BILIRUB SERPL-MCNC: 0.4 MG/DL (ref 0.1–1.5)
BILIRUB SERPL-MCNC: 0.4 MG/DL (ref 0.1–1.5)
BILIRUB SERPL-MCNC: 0.5 MG/DL (ref 0.1–1.5)
BILIRUB UR QL STRIP.AUTO: NEGATIVE
BUN BLD-MCNC: 56 MG/DL (ref 8–22)
BUN SERPL-MCNC: 30 MG/DL (ref 8–22)
BUN SERPL-MCNC: 33 MG/DL (ref 8–22)
BUN SERPL-MCNC: 40 MG/DL (ref 8–22)
BUN SERPL-MCNC: 47 MG/DL (ref 8–22)
BUN SERPL-MCNC: 48 MG/DL (ref 8–22)
BUN SERPL-MCNC: 49 MG/DL (ref 8–22)
CALCIUM SERPL-MCNC: 7.8 MG/DL (ref 8.5–10.5)
CALCIUM SERPL-MCNC: 8.7 MG/DL (ref 8.5–10.5)
CALCIUM SERPL-MCNC: 8.8 MG/DL (ref 8.5–10.5)
CALCIUM SERPL-MCNC: 9 MG/DL (ref 8.5–10.5)
CALCIUM SERPL-MCNC: 9.1 MG/DL (ref 8.5–10.5)
CALCIUM SERPL-MCNC: 9.5 MG/DL (ref 8.5–10.5)
CANCER AG125 SERPL-ACNC: 20.3 U/ML (ref 0–35)
CANCER AG125 SERPL-ACNC: 21.1 U/ML (ref 0–35)
CANCER AG125 SERPL-ACNC: 27.1 U/ML (ref 0–35)
CC # CATH TIP CULT: ABNORMAL /ML
CHLORIDE BLD-SCNC: 105 MMOL/L (ref 96–112)
CHLORIDE SERPL-SCNC: 102 MMOL/L (ref 96–112)
CHLORIDE SERPL-SCNC: 103 MMOL/L (ref 96–112)
CHLORIDE SERPL-SCNC: 106 MMOL/L (ref 96–112)
CHLORIDE SERPL-SCNC: 108 MMOL/L (ref 96–112)
CO2 BLD-SCNC: 20 MMOL/L (ref 20–33)
CO2 SERPL-SCNC: 18 MMOL/L (ref 20–33)
CO2 SERPL-SCNC: 19 MMOL/L (ref 20–33)
CO2 SERPL-SCNC: 20 MMOL/L (ref 20–33)
CO2 SERPL-SCNC: 21 MMOL/L (ref 20–33)
CO2 SERPL-SCNC: 24 MMOL/L (ref 20–33)
CO2 SERPL-SCNC: 24 MMOL/L (ref 20–33)
COLOR UR: YELLOW
CREAT BLD-MCNC: 3.5 MG/DL (ref 0.5–1.4)
CREAT SERPL-MCNC: 1.47 MG/DL (ref 0.5–1.4)
CREAT SERPL-MCNC: 1.57 MG/DL (ref 0.5–1.4)
CREAT SERPL-MCNC: 2.7 MG/DL (ref 0.5–1.4)
CREAT SERPL-MCNC: 2.79 MG/DL (ref 0.5–1.4)
CREAT SERPL-MCNC: 2.79 MG/DL (ref 0.5–1.4)
CREAT SERPL-MCNC: 2.8 MG/DL (ref 0.5–1.4)
CREAT UR-MCNC: 206.5 MG/DL
EKG IMPRESSION: NORMAL
EOSINOPHIL # BLD AUTO: 0.03 K/UL (ref 0–0.51)
EOSINOPHIL # BLD AUTO: 0.05 K/UL (ref 0–0.51)
EOSINOPHIL # BLD AUTO: 0.17 K/UL (ref 0–0.51)
EOSINOPHIL # BLD AUTO: 0.23 K/UL (ref 0–0.51)
EOSINOPHIL NFR BLD: 0.9 % (ref 0–6.9)
EOSINOPHIL NFR BLD: 0.9 % (ref 0–6.9)
EOSINOPHIL NFR BLD: 2.9 % (ref 0–6.9)
EOSINOPHIL NFR BLD: 4.5 % (ref 0–6.9)
EPI CELLS #/AREA URNS HPF: NEGATIVE /HPF
ERYTHROCYTE [DISTWIDTH] IN BLOOD BY AUTOMATED COUNT: 56.4 FL (ref 35.9–50)
ERYTHROCYTE [DISTWIDTH] IN BLOOD BY AUTOMATED COUNT: 56.7 FL (ref 35.9–50)
ERYTHROCYTE [DISTWIDTH] IN BLOOD BY AUTOMATED COUNT: 57.3 FL (ref 35.9–50)
ERYTHROCYTE [DISTWIDTH] IN BLOOD BY AUTOMATED COUNT: 62.8 FL (ref 35.9–50)
ERYTHROCYTE [DISTWIDTH] IN BLOOD BY AUTOMATED COUNT: 63.4 FL (ref 35.9–50)
ETEST SENSITIVITY ETEST: NORMAL
GLOBULIN SER CALC-MCNC: 3.1 G/DL (ref 1.9–3.5)
GLOBULIN SER CALC-MCNC: 3.4 G/DL (ref 1.9–3.5)
GLOBULIN SER CALC-MCNC: 3.4 G/DL (ref 1.9–3.5)
GLOBULIN SER CALC-MCNC: 4 G/DL (ref 1.9–3.5)
GLUCOSE BLD-MCNC: 113 MG/DL (ref 65–99)
GLUCOSE SERPL-MCNC: 103 MG/DL (ref 65–99)
GLUCOSE SERPL-MCNC: 105 MG/DL (ref 65–99)
GLUCOSE SERPL-MCNC: 105 MG/DL (ref 65–99)
GLUCOSE SERPL-MCNC: 106 MG/DL (ref 65–99)
GLUCOSE SERPL-MCNC: 112 MG/DL (ref 65–99)
GLUCOSE SERPL-MCNC: 85 MG/DL (ref 65–99)
GLUCOSE UR STRIP.AUTO-MCNC: NEGATIVE MG/DL
HCT VFR BLD AUTO: 26.3 % (ref 37–47)
HCT VFR BLD AUTO: 26.5 % (ref 37–47)
HCT VFR BLD AUTO: 31 % (ref 37–47)
HCT VFR BLD AUTO: 35.6 % (ref 37–47)
HCT VFR BLD AUTO: 35.7 % (ref 37–47)
HGB BLD-MCNC: 10 G/DL (ref 12–16)
HGB BLD-MCNC: 11.1 G/DL (ref 12–16)
HGB BLD-MCNC: 11.3 G/DL (ref 12–16)
HGB BLD-MCNC: 8.1 G/DL (ref 12–16)
HGB BLD-MCNC: 8.3 G/DL (ref 12–16)
HYALINE CASTS #/AREA URNS LPF: ABNORMAL /LPF
HYPOCHROMIA BLD QL SMEAR: ABNORMAL
IMM GRANULOCYTES # BLD AUTO: 0.02 K/UL (ref 0–0.11)
IMM GRANULOCYTES # BLD AUTO: 0.03 K/UL (ref 0–0.11)
IMM GRANULOCYTES NFR BLD AUTO: 0.4 % (ref 0–0.9)
IMM GRANULOCYTES NFR BLD AUTO: 0.5 % (ref 0–0.9)
INR PPP: 1.02 (ref 0.87–1.13)
KETONES UR STRIP.AUTO-MCNC: ABNORMAL MG/DL
LEUKOCYTE ESTERASE UR QL STRIP.AUTO: ABNORMAL
LG PLATELETS BLD QL SMEAR: NORMAL
LYMPHOCYTES # BLD AUTO: 0.75 K/UL (ref 1–4.8)
LYMPHOCYTES # BLD AUTO: 0.97 K/UL (ref 1–4.8)
LYMPHOCYTES # BLD AUTO: 1.27 K/UL (ref 1–4.8)
LYMPHOCYTES # BLD AUTO: 1.78 K/UL (ref 1–4.8)
LYMPHOCYTES NFR BLD: 19.1 % (ref 22–41)
LYMPHOCYTES NFR BLD: 21.9 % (ref 22–41)
LYMPHOCYTES NFR BLD: 23.5 % (ref 22–41)
LYMPHOCYTES NFR BLD: 34.9 % (ref 22–41)
MACROCYTES BLD QL SMEAR: ABNORMAL
MAGNESIUM SERPL-MCNC: 2.2 MG/DL (ref 1.5–2.5)
MAGNESIUM SERPL-MCNC: 2.2 MG/DL (ref 1.5–2.5)
MANUAL DIFF BLD: NORMAL
MANUAL DIFF BLD: NORMAL
MCH RBC QN AUTO: 26.5 PG (ref 27–33)
MCH RBC QN AUTO: 26.8 PG (ref 27–33)
MCH RBC QN AUTO: 27.8 PG (ref 27–33)
MCH RBC QN AUTO: 27.8 PG (ref 27–33)
MCH RBC QN AUTO: 28.5 PG (ref 27–33)
MCHC RBC AUTO-ENTMCNC: 30.8 G/DL (ref 33.6–35)
MCHC RBC AUTO-ENTMCNC: 31.2 G/DL (ref 33.6–35)
MCHC RBC AUTO-ENTMCNC: 31.3 G/DL (ref 33.6–35)
MCHC RBC AUTO-ENTMCNC: 31.7 G/DL (ref 33.6–35)
MCHC RBC AUTO-ENTMCNC: 32.3 G/DL (ref 33.6–35)
MCV RBC AUTO: 85.5 FL (ref 81.4–97.8)
MCV RBC AUTO: 85.9 FL (ref 81.4–97.8)
MCV RBC AUTO: 86.1 FL (ref 81.4–97.8)
MCV RBC AUTO: 89.2 FL (ref 81.4–97.8)
MCV RBC AUTO: 90.2 FL (ref 81.4–97.8)
MICRO URNS: ABNORMAL
MICROCYTES BLD QL SMEAR: ABNORMAL
MONOCYTES # BLD AUTO: 0.05 K/UL (ref 0–0.85)
MONOCYTES # BLD AUTO: 0.63 K/UL (ref 0–0.85)
MONOCYTES # BLD AUTO: 0.65 K/UL (ref 0–0.85)
MONOCYTES # BLD AUTO: 1.86 K/UL (ref 0–0.85)
MONOCYTES NFR BLD AUTO: 1.7 % (ref 0–13.4)
MONOCYTES NFR BLD AUTO: 10.8 % (ref 0–13.4)
MONOCYTES NFR BLD AUTO: 12.7 % (ref 0–13.4)
MONOCYTES NFR BLD AUTO: 36.5 % (ref 0–13.4)
MORPHOLOGY BLD-IMP: NORMAL
MORPHOLOGY BLD-IMP: NORMAL
NEUTROPHILS # BLD AUTO: 2.17 K/UL (ref 2–7.15)
NEUTROPHILS # BLD AUTO: 2.28 K/UL (ref 2–7.15)
NEUTROPHILS # BLD AUTO: 2.39 K/UL (ref 2–7.15)
NEUTROPHILS # BLD AUTO: 3.68 K/UL (ref 2–7.15)
NEUTROPHILS NFR BLD: 42.6 % (ref 44–72)
NEUTROPHILS NFR BLD: 46.9 % (ref 44–72)
NEUTROPHILS NFR BLD: 63.4 % (ref 44–72)
NEUTROPHILS NFR BLD: 71.3 % (ref 44–72)
NITRITE UR QL STRIP.AUTO: NEGATIVE
NRBC # BLD AUTO: 0 K/UL
NRBC # BLD AUTO: 0.02 K/UL
NRBC BLD-RTO: 0 /100 WBC
NRBC BLD-RTO: 0.4 /100 WBC
OVALOCYTES BLD QL SMEAR: NORMAL
PH UR STRIP.AUTO: 5 [PH]
PLATELET # BLD AUTO: 175 K/UL (ref 164–446)
PLATELET # BLD AUTO: 191 K/UL (ref 164–446)
PLATELET # BLD AUTO: 209 K/UL (ref 164–446)
PLATELET # BLD AUTO: 371 K/UL (ref 164–446)
PLATELET # BLD AUTO: 90 K/UL (ref 164–446)
PLATELET BLD QL SMEAR: NORMAL
PLATELET BLD QL SMEAR: NORMAL
PMV BLD AUTO: 10.7 FL (ref 9–12.9)
PMV BLD AUTO: 8.6 FL (ref 9–12.9)
PMV BLD AUTO: 8.6 FL (ref 9–12.9)
PMV BLD AUTO: 9 FL (ref 9–12.9)
PMV BLD AUTO: 9.3 FL (ref 9–12.9)
POIKILOCYTOSIS BLD QL SMEAR: NORMAL
POLYCHROMASIA BLD QL SMEAR: NORMAL
POTASSIUM BLD-SCNC: 5.1 MMOL/L (ref 3.6–5.5)
POTASSIUM SERPL-SCNC: 4.5 MMOL/L (ref 3.6–5.5)
POTASSIUM SERPL-SCNC: 4.8 MMOL/L (ref 3.6–5.5)
POTASSIUM SERPL-SCNC: 5 MMOL/L (ref 3.6–5.5)
POTASSIUM SERPL-SCNC: 5 MMOL/L (ref 3.6–5.5)
POTASSIUM SERPL-SCNC: 5.2 MMOL/L (ref 3.6–5.5)
POTASSIUM SERPL-SCNC: 5.5 MMOL/L (ref 3.6–5.5)
PROMYELOCYTES NFR BLD MANUAL: 0.9 %
PROT SERPL-MCNC: 6.1 G/DL (ref 6–8.2)
PROT SERPL-MCNC: 6.6 G/DL (ref 6–8.2)
PROT SERPL-MCNC: 6.9 G/DL (ref 6–8.2)
PROT SERPL-MCNC: 7.1 G/DL (ref 6–8.2)
PROT UR QL STRIP: 300 MG/DL
PROT UR-MCNC: 476.5 MG/DL (ref 0–15)
PROT/CREAT UR: 2308 MG/G (ref 10–107)
PROTHROMBIN TIME: 13.1 SEC (ref 12–14.6)
RBC # BLD AUTO: 3.06 M/UL (ref 4.2–5.4)
RBC # BLD AUTO: 3.1 M/UL (ref 4.2–5.4)
RBC # BLD AUTO: 3.6 M/UL (ref 4.2–5.4)
RBC # BLD AUTO: 3.96 M/UL (ref 4.2–5.4)
RBC # BLD AUTO: 3.99 M/UL (ref 4.2–5.4)
RBC # URNS HPF: >150 /HPF
RBC BLD AUTO: PRESENT
RBC BLD AUTO: PRESENT
RBC UR QL AUTO: ABNORMAL
SIGNIFICANT IND 70042: ABNORMAL
SITE SITE: ABNORMAL
SODIUM BLD-SCNC: 133 MMOL/L (ref 135–145)
SODIUM SERPL-SCNC: 131 MMOL/L (ref 135–145)
SODIUM SERPL-SCNC: 134 MMOL/L (ref 135–145)
SODIUM SERPL-SCNC: 136 MMOL/L (ref 135–145)
SODIUM SERPL-SCNC: 137 MMOL/L (ref 135–145)
SOURCE SOURCE: ABNORMAL
SP GR UR STRIP.AUTO: 1.01
UROBILINOGEN UR STRIP.AUTO-MCNC: 0.2 MG/DL
WBC # BLD AUTO: 3.2 K/UL (ref 4.8–10.8)
WBC # BLD AUTO: 4.6 K/UL (ref 4.8–10.8)
WBC # BLD AUTO: 5.1 K/UL (ref 4.8–10.8)
WBC # BLD AUTO: 5.1 K/UL (ref 4.8–10.8)
WBC # BLD AUTO: 5.8 K/UL (ref 4.8–10.8)
WBC #/AREA URNS HPF: ABNORMAL /HPF
YEAST HYPHAE #/AREA URNS HPF: PRESENT /HPF

## 2018-01-01 PROCEDURE — 82565 ASSAY OF CREATININE: CPT

## 2018-01-01 PROCEDURE — 36591 DRAW BLOOD OFF VENOUS DEVICE: CPT

## 2018-01-01 PROCEDURE — 160046 HCHG PACU - 1ST 60 MINS PHASE II: Performed by: SPECIALIST

## 2018-01-01 PROCEDURE — 160036 HCHG PACU - EA ADDL 30 MINS PHASE I: Performed by: SPECIALIST

## 2018-01-01 PROCEDURE — A9270 NON-COVERED ITEM OR SERVICE: HCPCS

## 2018-01-01 PROCEDURE — 80053 COMPREHEN METABOLIC PANEL: CPT

## 2018-01-01 PROCEDURE — 84156 ASSAY OF PROTEIN URINE: CPT

## 2018-01-01 PROCEDURE — 93010 ELECTROCARDIOGRAM REPORT: CPT | Performed by: INTERNAL MEDICINE

## 2018-01-01 PROCEDURE — C1769 GUIDE WIRE: HCPCS | Performed by: SPECIALIST

## 2018-01-01 PROCEDURE — 306580 SET INFUSION,POWERLOC 20G X.75: Performed by: SPECIALIST

## 2018-01-01 PROCEDURE — 160035 HCHG PACU - 1ST 60 MINS PHASE I: Performed by: SPECIALIST

## 2018-01-01 PROCEDURE — 0TP98DZ REMOVAL OF INTRALUMINAL DEVICE FROM URETER, VIA NATURAL OR ARTIFICIAL OPENING ENDOSCOPIC: ICD-10-PCS | Performed by: SPECIALIST

## 2018-01-01 PROCEDURE — 501330 HCHG SET, CYSTO IRRIG TUBING: Performed by: SPECIALIST

## 2018-01-01 PROCEDURE — 85025 COMPLETE CBC W/AUTO DIFF WBC: CPT

## 2018-01-01 PROCEDURE — A4212 NON CORING NEEDLE OR STYLET: HCPCS

## 2018-01-01 PROCEDURE — 87071 CULTURE AEROBIC QUANT OTHER: CPT

## 2018-01-01 PROCEDURE — 87181 SC STD AGAR DILUTION PER AGT: CPT

## 2018-01-01 PROCEDURE — 96413 CHEMO IV INFUSION 1 HR: CPT

## 2018-01-01 PROCEDURE — 700105 HCHG RX REV CODE 258: Performed by: NURSE PRACTITIONER

## 2018-01-01 PROCEDURE — 700111 HCHG RX REV CODE 636 W/ 250 OVERRIDE (IP)

## 2018-01-01 PROCEDURE — A6402 STERILE GAUZE <= 16 SQ IN: HCPCS | Performed by: SPECIALIST

## 2018-01-01 PROCEDURE — 87077 CULTURE AEROBIC IDENTIFY: CPT

## 2018-01-01 PROCEDURE — 84520 ASSAY OF UREA NITROGEN: CPT

## 2018-01-01 PROCEDURE — 160048 HCHG OR STATISTICAL LEVEL 1-5: Performed by: SPECIALIST

## 2018-01-01 PROCEDURE — 700117 HCHG RX CONTRAST REV CODE 255: Performed by: SPECIALIST

## 2018-01-01 PROCEDURE — 85610 PROTHROMBIN TIME: CPT | Mod: GA

## 2018-01-01 PROCEDURE — 700102 HCHG RX REV CODE 250 W/ 637 OVERRIDE(OP): Performed by: SPECIALIST

## 2018-01-01 PROCEDURE — 93005 ELECTROCARDIOGRAM TRACING: CPT | Performed by: SPECIALIST

## 2018-01-01 PROCEDURE — 82570 ASSAY OF URINE CREATININE: CPT

## 2018-01-01 PROCEDURE — 160002 HCHG RECOVERY MINUTES (STAT): Performed by: SPECIALIST

## 2018-01-01 PROCEDURE — 87086 URINE CULTURE/COLONY COUNT: CPT

## 2018-01-01 PROCEDURE — 99214 OFFICE O/P EST MOD 30 MIN: CPT | Performed by: INTERNAL MEDICINE

## 2018-01-01 PROCEDURE — 82435 ASSAY OF BLOOD CHLORIDE: CPT | Mod: XU

## 2018-01-01 PROCEDURE — 500880 HCHG PACK, CYSTO W/SEP LEGGINGS: Performed by: SPECIALIST

## 2018-01-01 PROCEDURE — 700105 HCHG RX REV CODE 258

## 2018-01-01 PROCEDURE — 700111 HCHG RX REV CODE 636 W/ 250 OVERRIDE (IP): Performed by: SPECIALIST

## 2018-01-01 PROCEDURE — 85730 THROMBOPLASTIN TIME PARTIAL: CPT | Mod: GA

## 2018-01-01 PROCEDURE — 84295 ASSAY OF SERUM SODIUM: CPT | Mod: XU

## 2018-01-01 PROCEDURE — 83735 ASSAY OF MAGNESIUM: CPT

## 2018-01-01 PROCEDURE — 87075 CULTR BACTERIA EXCEPT BLOOD: CPT

## 2018-01-01 PROCEDURE — 0T768DZ DILATION OF RIGHT URETER WITH INTRALUMINAL DEVICE, VIA NATURAL OR ARTIFICIAL OPENING ENDOSCOPIC: ICD-10-PCS | Performed by: SPECIALIST

## 2018-01-01 PROCEDURE — A9270 NON-COVERED ITEM OR SERVICE: HCPCS | Performed by: SPECIALIST

## 2018-01-01 PROCEDURE — 160039 HCHG SURGERY MINUTES - EA ADDL 1 MIN LEVEL 3: Performed by: SPECIALIST

## 2018-01-01 PROCEDURE — 86304 IMMUNOASSAY TUMOR CA 125: CPT

## 2018-01-01 PROCEDURE — 96375 TX/PRO/DX INJ NEW DRUG ADDON: CPT

## 2018-01-01 PROCEDURE — 96361 HYDRATE IV INFUSION ADD-ON: CPT

## 2018-01-01 PROCEDURE — 700101 HCHG RX REV CODE 250

## 2018-01-01 PROCEDURE — 85027 COMPLETE CBC AUTOMATED: CPT

## 2018-01-01 PROCEDURE — C2617 STENT, NON-COR, TEM W/O DEL: HCPCS | Performed by: SPECIALIST

## 2018-01-01 PROCEDURE — 85007 BL SMEAR W/DIFF WBC COUNT: CPT

## 2018-01-01 PROCEDURE — 81001 URINALYSIS AUTO W/SCOPE: CPT

## 2018-01-01 PROCEDURE — G0378 HOSPITAL OBSERVATION PER HR: HCPCS

## 2018-01-01 PROCEDURE — 87076 CULTURE ANAEROBE IDENT EACH: CPT

## 2018-01-01 PROCEDURE — 160028 HCHG SURGERY MINUTES - 1ST 30 MINS LEVEL 3: Performed by: SPECIALIST

## 2018-01-01 PROCEDURE — 84132 ASSAY OF SERUM POTASSIUM: CPT | Mod: XU

## 2018-01-01 PROCEDURE — 700111 HCHG RX REV CODE 636 W/ 250 OVERRIDE (IP): Performed by: NURSE PRACTITIONER

## 2018-01-01 PROCEDURE — 700102 HCHG RX REV CODE 250 W/ 637 OVERRIDE(OP)

## 2018-01-01 PROCEDURE — 74176 CT ABD & PELVIS W/O CONTRAST: CPT

## 2018-01-01 PROCEDURE — 160025 RECOVERY II MINUTES (STATS): Performed by: SPECIALIST

## 2018-01-01 PROCEDURE — 82947 ASSAY GLUCOSE BLOOD QUANT: CPT | Mod: XU

## 2018-01-01 PROCEDURE — 36415 COLL VENOUS BLD VENIPUNCTURE: CPT | Mod: GA

## 2018-01-01 PROCEDURE — 700105 HCHG RX REV CODE 258: Performed by: SPECIALIST

## 2018-01-01 PROCEDURE — 96360 HYDRATION IV INFUSION INIT: CPT

## 2018-01-01 PROCEDURE — 160009 HCHG ANES TIME/MIN: Performed by: SPECIALIST

## 2018-01-01 PROCEDURE — 96417 CHEMO IV INFUS EACH ADDL SEQ: CPT

## 2018-01-01 PROCEDURE — 306637 HCHG MISC ORTHO ITEM RC 0274

## 2018-01-01 PROCEDURE — 80048 BASIC METABOLIC PNL TOTAL CA: CPT

## 2018-01-01 PROCEDURE — 82374 ASSAY BLOOD CARBON DIOXIDE: CPT | Mod: XU

## 2018-01-01 PROCEDURE — 96374 THER/PROPH/DIAG INJ IV PUSH: CPT

## 2018-01-01 DEVICE — STENT UROLOGICAL POLARIS 6X22  ULTRA: Type: IMPLANTABLE DEVICE | Status: FUNCTIONAL

## 2018-01-01 DEVICE — STENT UROLOGICAL POLARIS 7X22  ULTRA: Type: IMPLANTABLE DEVICE | Status: FUNCTIONAL

## 2018-01-01 RX ORDER — METOPROLOL TARTRATE 50 MG/1
50 TABLET, FILM COATED ORAL 2 TIMES DAILY
Qty: 180 TAB | Refills: 3 | Status: SHIPPED | OUTPATIENT
Start: 2018-01-01 | End: 2018-01-01

## 2018-01-01 RX ORDER — LIDOCAINE HYDROCHLORIDE 10 MG/ML
0.5 INJECTION, SOLUTION INFILTRATION; PERINEURAL SEE ADMIN INSTRUCTIONS
Status: CANCELLED | OUTPATIENT
Start: 2018-01-01

## 2018-01-01 RX ORDER — ONDANSETRON 2 MG/ML
8 INJECTION INTRAMUSCULAR; INTRAVENOUS EVERY 4 HOURS PRN
Status: CANCELLED | OUTPATIENT
Start: 2018-01-01

## 2018-01-01 RX ORDER — OXYCODONE HCL 5 MG/5 ML
SOLUTION, ORAL ORAL
Status: COMPLETED
Start: 2018-01-01 | End: 2018-01-01

## 2018-01-01 RX ORDER — CARVEDILOL 6.25 MG/1
25 TABLET ORAL 2 TIMES DAILY WITH MEALS
Status: DISCONTINUED | OUTPATIENT
Start: 2018-01-01 | End: 2018-01-01

## 2018-01-01 RX ORDER — LIDOCAINE HYDROCHLORIDE 10 MG/ML
INJECTION, SOLUTION INFILTRATION; PERINEURAL
Status: COMPLETED
Start: 2018-01-01 | End: 2018-01-01

## 2018-01-01 RX ORDER — ONDANSETRON 8 MG/1
8 TABLET, ORALLY DISINTEGRATING ORAL
Status: CANCELLED | OUTPATIENT
Start: 2018-01-01

## 2018-01-01 RX ORDER — ONDANSETRON 2 MG/ML
4 INJECTION INTRAMUSCULAR; INTRAVENOUS
Status: CANCELLED | OUTPATIENT
Start: 2018-01-01

## 2018-01-01 RX ORDER — LIDOCAINE AND PRILOCAINE 25; 25 MG/G; MG/G
1 CREAM TOPICAL
Status: DISCONTINUED | OUTPATIENT
Start: 2018-01-01 | End: 2018-01-01 | Stop reason: HOSPADM

## 2018-01-01 RX ORDER — BISACODYL 10 MG
10 SUPPOSITORY, RECTAL RECTAL
Status: DISCONTINUED | OUTPATIENT
Start: 2018-01-01 | End: 2018-01-01 | Stop reason: HOSPADM

## 2018-01-01 RX ORDER — CLONIDINE HYDROCHLORIDE 0.1 MG/1
0.1 TABLET ORAL 2 TIMES DAILY PRN
Qty: 30 TAB | Refills: 6 | Status: ON HOLD | OUTPATIENT
Start: 2018-01-01 | End: 2018-01-01

## 2018-01-01 RX ORDER — CARVEDILOL 6.25 MG/1
25 TABLET ORAL 2 TIMES DAILY WITH MEALS
Status: DISCONTINUED | OUTPATIENT
Start: 2018-01-01 | End: 2018-01-01 | Stop reason: HOSPADM

## 2018-01-01 RX ORDER — HYDRALAZINE HYDROCHLORIDE 20 MG/ML
5 INJECTION INTRAMUSCULAR; INTRAVENOUS 3 TIMES DAILY PRN
Status: DISCONTINUED | OUTPATIENT
Start: 2018-01-01 | End: 2018-01-01 | Stop reason: HOSPADM

## 2018-01-01 RX ORDER — HYDROCHLOROTHIAZIDE 25 MG/1
25 TABLET ORAL DAILY
Qty: 30 TAB | Refills: 3 | Status: ON HOLD | OUTPATIENT
Start: 2018-01-01 | End: 2018-01-01

## 2018-01-01 RX ORDER — ONDANSETRON 2 MG/ML
8 INJECTION INTRAMUSCULAR; INTRAVENOUS EVERY 4 HOURS PRN
Status: DISCONTINUED | OUTPATIENT
Start: 2018-01-01 | End: 2018-01-01 | Stop reason: HOSPADM

## 2018-01-01 RX ORDER — OXYCODONE HYDROCHLORIDE AND ACETAMINOPHEN 5; 325 MG/1; MG/1
TABLET ORAL
Status: COMPLETED
Start: 2018-01-01 | End: 2018-01-01

## 2018-01-01 RX ORDER — PROCHLORPERAZINE MALEATE 10 MG
10 TABLET ORAL EVERY 6 HOURS PRN
Status: CANCELLED | OUTPATIENT
Start: 2018-01-01

## 2018-01-01 RX ORDER — SODIUM CHLORIDE 9 MG/ML
INJECTION, SOLUTION INTRAVENOUS CONTINUOUS
Status: CANCELLED | OUTPATIENT
Start: 2018-01-01

## 2018-01-01 RX ORDER — LEVOTHYROXINE SODIUM 0.07 MG/1
75 TABLET ORAL
Status: DISCONTINUED | OUTPATIENT
Start: 2018-01-01 | End: 2018-01-01 | Stop reason: HOSPADM

## 2018-01-01 RX ORDER — CARVEDILOL 25 MG/1
25 TABLET ORAL 2 TIMES DAILY WITH MEALS
Qty: 180 TAB | Refills: 2 | Status: SHIPPED | OUTPATIENT
Start: 2018-01-01

## 2018-01-01 RX ORDER — OXYCODONE AND ACETAMINOPHEN 7.5; 325 MG/1; MG/1
1-2 TABLET ORAL EVERY 6 HOURS PRN
Status: DISCONTINUED | OUTPATIENT
Start: 2018-01-01 | End: 2018-01-01 | Stop reason: HOSPADM

## 2018-01-01 RX ORDER — POLYETHYLENE GLYCOL 3350 17 G/17G
1 POWDER, FOR SOLUTION ORAL
Status: DISCONTINUED | OUTPATIENT
Start: 2018-01-01 | End: 2018-01-01 | Stop reason: HOSPADM

## 2018-01-01 RX ORDER — MIDAZOLAM HYDROCHLORIDE 1 MG/ML
INJECTION INTRAMUSCULAR; INTRAVENOUS
Status: DISCONTINUED
Start: 2018-01-01 | End: 2018-01-01 | Stop reason: HOSPADM

## 2018-01-01 RX ORDER — BUPIVACAINE HYDROCHLORIDE 2.5 MG/ML
0.5 INJECTION, SOLUTION EPIDURAL; INFILTRATION; INTRACAUDAL
Status: DISCONTINUED | OUTPATIENT
Start: 2018-01-01 | End: 2018-01-01 | Stop reason: HOSPADM

## 2018-01-01 RX ORDER — LEVOTHYROXINE SODIUM 0.07 MG/1
75 TABLET ORAL
COMMUNITY

## 2018-01-01 RX ORDER — ONDANSETRON 2 MG/ML
4 INJECTION INTRAMUSCULAR; INTRAVENOUS EVERY 6 HOURS PRN
Status: DISCONTINUED | OUTPATIENT
Start: 2018-01-01 | End: 2018-01-01 | Stop reason: HOSPADM

## 2018-01-01 RX ORDER — SODIUM CHLORIDE 450 MG/100ML
INJECTION, SOLUTION INTRAVENOUS CONTINUOUS
Status: DISCONTINUED | OUTPATIENT
Start: 2018-01-01 | End: 2018-01-01 | Stop reason: HOSPADM

## 2018-01-01 RX ORDER — HYDRALAZINE HYDROCHLORIDE 20 MG/ML
10 INJECTION INTRAMUSCULAR; INTRAVENOUS ONCE
Status: COMPLETED | OUTPATIENT
Start: 2018-01-01 | End: 2018-01-01

## 2018-01-01 RX ORDER — CLONIDINE HYDROCHLORIDE 0.1 MG/1
0.1 TABLET ORAL 3 TIMES DAILY
Qty: 90 TAB | Refills: 3 | Status: ON HOLD | OUTPATIENT
Start: 2018-01-01 | End: 2018-01-01

## 2018-01-01 RX ORDER — LEVOTHYROXINE SODIUM 0.15 MG/1
150 TABLET ORAL
Status: DISCONTINUED | OUTPATIENT
Start: 2018-01-01 | End: 2018-01-01 | Stop reason: HOSPADM

## 2018-01-01 RX ORDER — SODIUM CHLORIDE 9 MG/ML
2000 INJECTION, SOLUTION INTRAVENOUS ONCE
Status: COMPLETED | OUTPATIENT
Start: 2018-01-01 | End: 2018-01-01

## 2018-01-01 RX ORDER — SODIUM CHLORIDE 9 MG/ML
2000 INJECTION, SOLUTION INTRAVENOUS ONCE
Status: CANCELLED | OUTPATIENT
Start: 2018-01-01 | End: 2018-01-01

## 2018-01-01 RX ORDER — HYDRALAZINE HYDROCHLORIDE 20 MG/ML
INJECTION INTRAMUSCULAR; INTRAVENOUS
Status: COMPLETED
Start: 2018-01-01 | End: 2018-01-01

## 2018-01-01 RX ORDER — HYDRALAZINE HYDROCHLORIDE 20 MG/ML
5 INJECTION INTRAMUSCULAR; INTRAVENOUS ONCE
Status: COMPLETED | OUTPATIENT
Start: 2018-01-01 | End: 2018-01-01

## 2018-01-01 RX ORDER — AMOXICILLIN 250 MG
2 CAPSULE ORAL 2 TIMES DAILY
Status: DISCONTINUED | OUTPATIENT
Start: 2018-01-01 | End: 2018-01-01 | Stop reason: HOSPADM

## 2018-01-01 RX ORDER — SODIUM CHLORIDE, SODIUM LACTATE, POTASSIUM CHLORIDE, CALCIUM CHLORIDE 600; 310; 30; 20 MG/100ML; MG/100ML; MG/100ML; MG/100ML
INJECTION, SOLUTION INTRAVENOUS CONTINUOUS
Status: DISCONTINUED | OUTPATIENT
Start: 2018-01-01 | End: 2018-01-01 | Stop reason: HOSPADM

## 2018-01-01 RX ADMIN — FENTANYL CITRATE 50 MCG: 50 INJECTION, SOLUTION INTRAMUSCULAR; INTRAVENOUS at 16:11

## 2018-01-01 RX ADMIN — FENTANYL CITRATE 25 MCG: 50 INJECTION, SOLUTION INTRAMUSCULAR; INTRAVENOUS at 13:45

## 2018-01-01 RX ADMIN — FENTANYL CITRATE 50 MCG: 50 INJECTION, SOLUTION INTRAMUSCULAR; INTRAVENOUS at 16:06

## 2018-01-01 RX ADMIN — OXYCODONE HYDROCHLORIDE 5 MG: 5 SOLUTION ORAL at 13:45

## 2018-01-01 RX ADMIN — GEMCITABINE 1368 MG: 1 INJECTION, POWDER, LYOPHILIZED, FOR SOLUTION INTRAVENOUS at 12:08

## 2018-01-01 RX ADMIN — SODIUM CHLORIDE 2000 ML: 9 INJECTION, SOLUTION INTRAVENOUS at 08:30

## 2018-01-01 RX ADMIN — CARBOPLATIN 228.6 MG: 10 INJECTION, SOLUTION INTRAVENOUS at 12:58

## 2018-01-01 RX ADMIN — HEPARIN 500 UNITS: 100 SYRINGE at 14:18

## 2018-01-01 RX ADMIN — HYDRALAZINE HYDROCHLORIDE 10 MG: 20 INJECTION INTRAMUSCULAR; INTRAVENOUS at 05:34

## 2018-01-01 RX ADMIN — HYDRALAZINE HYDROCHLORIDE 5 MG: 20 INJECTION INTRAMUSCULAR; INTRAVENOUS at 20:00

## 2018-01-01 RX ADMIN — GEMCITABINE 1368 MG: 1 INJECTION, POWDER, LYOPHILIZED, FOR SOLUTION INTRAVENOUS at 12:48

## 2018-01-01 RX ADMIN — HYDRALAZINE HYDROCHLORIDE 5 MG: 20 INJECTION INTRAMUSCULAR; INTRAVENOUS at 01:12

## 2018-01-01 RX ADMIN — OXYCODONE HYDROCHLORIDE 10 MG: 5 SOLUTION ORAL at 16:05

## 2018-01-01 RX ADMIN — SODIUM CHLORIDE 1000 MG: 9 INJECTION, SOLUTION INTRAVENOUS at 13:38

## 2018-01-01 RX ADMIN — SODIUM CHLORIDE: 4.5 INJECTION, SOLUTION INTRAVENOUS at 00:55

## 2018-01-01 RX ADMIN — HYDRALAZINE HYDROCHLORIDE 5 MG: 20 INJECTION INTRAMUSCULAR; INTRAVENOUS at 16:30

## 2018-01-01 RX ADMIN — CARVEDILOL 25 MG: 6.25 TABLET, FILM COATED ORAL at 09:50

## 2018-01-01 RX ADMIN — LEVOTHYROXINE SODIUM 75 MCG: 75 TABLET ORAL at 05:35

## 2018-01-01 RX ADMIN — CARVEDILOL 25 MG: 6.25 TABLET, FILM COATED ORAL at 18:10

## 2018-01-01 RX ADMIN — LEVOTHYROXINE SODIUM 75 MCG: 75 TABLET ORAL at 06:52

## 2018-01-01 RX ADMIN — IOHEXOL 50 ML: 240 INJECTION, SOLUTION INTRATHECAL; INTRAVASCULAR; INTRAVENOUS; ORAL at 13:40

## 2018-01-01 RX ADMIN — SODIUM CHLORIDE, SODIUM LACTATE, POTASSIUM CHLORIDE, CALCIUM CHLORIDE: 600; 310; 30; 20 INJECTION, SOLUTION INTRAVENOUS at 09:15

## 2018-01-01 RX ADMIN — CARVEDILOL 25 MG: 6.25 TABLET, FILM COATED ORAL at 08:08

## 2018-01-01 RX ADMIN — HEPARIN 500 UNITS: 100 SYRINGE at 16:18

## 2018-01-01 RX ADMIN — HYDRALAZINE HYDROCHLORIDE 5 MG: 20 INJECTION INTRAMUSCULAR; INTRAVENOUS at 16:17

## 2018-01-01 RX ADMIN — LIDOCAINE HYDROCHLORIDE: 10 INJECTION, SOLUTION INFILTRATION; PERINEURAL at 09:15

## 2018-01-01 RX ADMIN — CARVEDILOL 25 MG: 6.25 TABLET, FILM COATED ORAL at 00:09

## 2018-01-01 RX ADMIN — ONDANSETRON 8 MG: 2 INJECTION, SOLUTION INTRAMUSCULAR; INTRAVENOUS at 12:13

## 2018-01-01 RX ADMIN — HEPARIN 500 UNITS: 100 SYRINGE at 15:46

## 2018-01-01 RX ADMIN — ONDANSETRON 4 MG: 2 INJECTION INTRAMUSCULAR; INTRAVENOUS at 19:27

## 2018-01-01 RX ADMIN — ONDANSETRON HYDROCHLORIDE 16 MG: 2 INJECTION, SOLUTION INTRAMUSCULAR; INTRAVENOUS at 11:11

## 2018-01-01 RX ADMIN — HEPARIN 500 UNITS: 100 SYRINGE at 13:30

## 2018-01-01 RX ADMIN — HYDROMORPHONE HYDROCHLORIDE 0.5 MG: 10 INJECTION, SOLUTION INTRAMUSCULAR; INTRAVENOUS; SUBCUTANEOUS at 16:15

## 2018-01-01 RX ADMIN — HEPARIN 500 UNITS: 100 SYRINGE at 11:04

## 2018-01-01 RX ADMIN — OXYCODONE AND ACETAMINOPHEN 1 TABLET: 5; 325 TABLET ORAL at 13:30

## 2018-01-01 ASSESSMENT — PATIENT HEALTH QUESTIONNAIRE - PHQ9
SUM OF ALL RESPONSES TO PHQ9 QUESTIONS 1 AND 2: 0
1. LITTLE INTEREST OR PLEASURE IN DOING THINGS: NOT AT ALL
1. LITTLE INTEREST OR PLEASURE IN DOING THINGS: NOT AT ALL
2. FEELING DOWN, DEPRESSED, IRRITABLE, OR HOPELESS: NOT AT ALL
SUM OF ALL RESPONSES TO PHQ9 QUESTIONS 1 AND 2: 0
1. LITTLE INTEREST OR PLEASURE IN DOING THINGS: NOT AT ALL
2. FEELING DOWN, DEPRESSED, IRRITABLE, OR HOPELESS: NOT AT ALL
2. FEELING DOWN, DEPRESSED, IRRITABLE, OR HOPELESS: NOT AT ALL
SUM OF ALL RESPONSES TO PHQ9 QUESTIONS 1 AND 2: 0

## 2018-01-01 ASSESSMENT — PAIN SCALES - GENERAL
PAINLEVEL_OUTOF10: 2
PAINLEVEL_OUTOF10: 4
PAINLEVEL_OUTOF10: 2
PAINLEVEL_OUTOF10: 4
PAINLEVEL_OUTOF10: 2
PAINLEVEL_OUTOF10: 4
PAINLEVEL_OUTOF10: 7
PAINLEVEL: NO PAIN
PAINLEVEL: NO PAIN
PAINLEVEL_OUTOF10: 0
PAINLEVEL_OUTOF10: 2
PAINLEVEL: 3=SLIGHT PAIN
PAINLEVEL_OUTOF10: 3
PAINLEVEL: 2=MINIMAL-SLIGHT
PAINLEVEL_OUTOF10: 5
PAINLEVEL_OUTOF10: 0
PAINLEVEL_OUTOF10: 0
PAINLEVEL: NO PAIN
PAINLEVEL_OUTOF10: 0
PAINLEVEL_OUTOF10: 2
PAINLEVEL_OUTOF10: 5
PAINLEVEL_OUTOF10: 2
PAINLEVEL_OUTOF10: 2

## 2018-01-01 ASSESSMENT — ENCOUNTER SYMPTOMS
CARDIOVASCULAR NEGATIVE: 1
PALPITATIONS: 0

## 2018-01-01 ASSESSMENT — COPD QUESTIONNAIRES
DO YOU EVER COUGH UP ANY MUCUS OR PHLEGM?: NO/ONLY WITH OCCASIONAL COLDS OR INFECTIONS
COPD SCREENING SCORE: 2
DURING THE PAST 4 WEEKS HOW MUCH DID YOU FEEL SHORT OF BREATH: NONE/LITTLE OF THE TIME
IN THE PAST 12 MONTHS DO YOU DO LESS THAN YOU USED TO BECAUSE OF YOUR BREATHING PROBLEMS: DISAGREE/UNSURE
HAVE YOU SMOKED AT LEAST 100 CIGARETTES IN YOUR ENTIRE LIFE: NO/DON'T KNOW

## 2018-01-01 ASSESSMENT — LIFESTYLE VARIABLES
ALCOHOL_USE: NO
EVER_SMOKED: NEVER

## 2018-01-04 PROBLEM — R60.0 PERIPHERAL EDEMA: Status: ACTIVE | Noted: 2018-01-01

## 2018-01-04 PROBLEM — R60.9 PERIPHERAL EDEMA: Status: ACTIVE | Noted: 2018-01-01

## 2018-01-17 NOTE — PROGRESS NOTES
Pt presents ambulatory for labs via port prior to f/u with Dr. Delcid tomorrow. Port accessed with sterile technique and blood return noted, labs drawn and port flushed with saline and heparin per protocol, de-accessed with gauze drsg applied to site. Pt to return when future plan is determined with MD at f/u appt. Pt left ambulatory in no distress at 1620

## 2018-01-18 PROBLEM — Z51.11 ENCOUNTER FOR ANTINEOPLASTIC CHEMOTHERAPY: Status: ACTIVE | Noted: 2018-01-01

## 2018-01-18 NOTE — TELEPHONE ENCOUNTER
----- Message from Hieu Guerrero, Med Ass't sent at 1/18/2018  9:37 AM PST -----  Regarding: Chemo and Hospital med question   Hi VICENTA Love pt was recently seen in the ER for a virus and they noticed that her BP was fluctuating a lot. They doubled her Metroprolol and she wants to know if IA wants her to keep it that way as she is starting chemo again 1/22/18. She did request for a call back after 12 as she will be out of the house before then. I also scheduled her for the soonest available appt in [place].     Thanks,  Hieu [phone]    ==================================================================    Called pt, pt reports she had Chemo Sept-Oct 2017 and this resulted to an elevated BP, she's been off Chemo x 7 wks but she was just recently in the hospital for Viral Pharyngitis and Dehydration 12/20/17-12/21/17 during that hospital stay her BP was elevated so upon discharged the hospitalist increased her Metoprolol to 50mg BID, pt reports she has been taking the increase dose since discharge and her BP on average is still high, pt reported BP between 150-180/70-80s with HR 50-60s, pt is concerned as she is about to start her new cycle of Chemo on 1/22/18, pt reports she has a lot of lymphedema and fluid retention which Lasix did not help in the past.    To Dr Lua for advice

## 2018-01-19 NOTE — TELEPHONE ENCOUNTER
S/w pt, discussed Dr Lua recommendations, education regarding new meds provided to pt, pt agreed and verbalizes understanding    New Rx for HCTZ and Clonidine called in to [facility]    CMP ordered, pt reports she will go to [facility] and they can pull out the order from [facility], instructed pt to call our office if they can pull the order, pt verbalizes understanding.

## 2018-01-19 NOTE — TELEPHONE ENCOUNTER
Please add HCTZ 25 mg PO daily and repeat chem 7 in 2-4 weeks.  Also rx clonidine 0.1 mg PO TID prn SBP > 180 mmHg.    ESTEFANÍA NICOLE

## 2018-01-21 NOTE — PROGRESS NOTES
"Pharmacy chemotherapy verification:    Patient Name: Rosanna Damico  Dx: recurrent ovarian cancer          Protocol: carboplatin/gemcitabine + bevacizumab     *Dosing Reference*  Carboplatin (Paraplatin) AUC 4 IV once on day 1  Gemcitabine (Gemzar) 1000 mg/m2 IV once per day on days 1 & 8 - Bhavin NICOLE reduced dose to 800 mg/m2 starting with C5D1 on 1/22/18 due to tolerance   Bevacizumab (Avastin) 15 mg/kg IV once on day 1, given first  - Avastin HELD 11/18/17= C4D1 due to proteinuria  21-day cycle for 6 to 10 cycles, based on response; after completing these cycles of carboplatin, gemcitabine, and bevacizumab, patients continue on bevacizumab maintenance until progression of disease or unacceptable toxicity   Rayne SANTIAGO, et al- [facility]-  J Clin Oncol. 2012 Clif 10; 30(17): 6766-8836.     Allergies:  Cisplatin and Codeine     /66   Pulse (!) 56   Temp 36.4 °C (97.5 °F)   Resp 18   Ht 1.63 m (5' 4.17\")   Wt 65.3 kg (143 lb 15.4 oz)   SpO2 99%   BMI 24.58 kg/m²  Body surface area is 1.72 meters squared.     Labs 1/22/18:  ANC~ 3680 Plt = 191k   Hgb = 11.3   SCr = 1.47 mg/dL CrCl ~ 34 mL/min   LFT's/TBili =WNL/0.4   K+ = 4.8  Urine protein: 300 mg/dL (3+) -Bhavin NICOLE aware of Urine Protein and UPCR below, orders received to treat as ordered.    1/16/18: Mag = 2.2    1/18/18 Urine: Creatinine: 46.13 mg/dL;  Total protein: 106.3 mg/dL; UPCR: 2.3 g/g     9/6/17 Carboplatin skin test: no reaction     Drug Order   (Drug name, dose, route, IV Fluid & volume, frequency, number of doses) Cycle: C5D1 ([place] Cycle 1) delayed due to hospitalization  Previous treatment: C4 D8 = 11/25/17  carboplatin skin test 9/6/17;   6 cycles Taxol, last 4/4/17     Medication = Gemcitabine  Base Dose= 800 mg/m2   Calc Dose: Base Dose x 1.72 m2 = 1376 mg  Final Dose = 1368 mg  Route = IV  Fluid & Volume =  mL  Admin Duration = Over 30min          <5% difference, OK to treat with final dose   Medication = Carboplatin  Base Dose= " AUC 4  Calc Dose: Base Dose x (34 + 25) = 236 mg  Final Dose = 228.6 mg  Route = IV  Fluid & Volume = NS 250mL  Admin Duration = Over 30 min          <5% difference, OK to treat with final dose   Medication = Bevacizumab  Base Dose= 15 mg/kg  Calc Dose:Base Dose x 65.3 kg = 980 mg  Final Dose = 1000 mg  Route = IV  Fluid & Volume =  mL  Admin Duration = Over 30 min         <10% difference, OK to treat with final dose       By my signature below, I confirm this process was performed independently with the BSA and all final chemotherapy dosing calculations congruent. I have reviewed the above chemotherapy order and that my calculation of the final dose and BSA (when applicable) corroborate those calculations of the  pharmacist. Discrepancies of 5% or greater in the written dose have been addressed and documented within the EPIC Progress notes.    Jj Hugo, PharmD

## 2018-01-22 NOTE — PROGRESS NOTES
"Pharmacy Chemotherapy Verification    Patient Name: Rosanna Damico  Dx: Ovarian Cancer        Cycle: Cycle 1 Day 1   - resuming treatment after re-evaluation per MD. Starting at C1D1 due to prolonged delay in therapy.   Previous treatment: 11/25/17 C4D8    Protocol: CARBOplatin + Gemcitabine + Avastin     Carboplatin (Paraplatin) AUC 4 IV once on day 1  Gemcitabine (Gemzar) 1000 mg/m2 IV once per day on days 1 & 8  -reduced to 800mg/m2 per MD   Bevacizumab (Avastin) 15 mg/kg IV once on day 1  21-day cycle for 6 to 10 cycles, based on response; after completing these cycles of carboplatin, gemcitabine, and bevacizumab, patients continue on bevacizumab maintenance until progression of disease or unacceptable toxicity   Agustín PHILLIPS et al. Gemcitabine Plus Carboplatin Compared With Carboplatin in Patients With Platinum-Sensitive Recurrent Ovarian Cancer: An Intergroup Trial of the AGO-OVAR, the [facility] CTG, and the EORTC GCG. JCO Vol 24, No 29 (October 10), 2006: pp. 4322-9329   Rayne C, Arlene B, Travis LR, Otf GUZMANV, Sarah A, Cathy SV. Final overall survival and safety analysis of OCEANS, a phase 3 trial of chemotherapy with or without bevacizumab in patients with platinum-sensitive recurrent ovarian cancer. Gynecol Oncol. 2015 Oct;139(1):10-6. Epub 2015 Aug 10     Allergies:  Cisplatin and Codeine    /66   Pulse (!) 56   Temp 36.4 °C (97.5 °F)   Resp 18   Ht 1.63 m (5' 4.17\")   Wt 65.3 kg (143 lb 15.4 oz)   SpO2 99%   BMI 24.58 kg/m²  Body surface area is 1.72 meters squared.    ANC~ 3680 Plt = 191k   Hgb = 11.3     SCr = 1.47mg/dL CrCl ~ 34mL/min   LFT's = WNL TBili = 0.4   Urine protein = 300(3+)      Ref. Range 1/18/2018 11:10   Creatinine, Random Urine Latest Units: mg/dL 46.13   Total Protein, Urine Latest Ref Range: 0.0 - 15.0 mg/dL 106.3 (H)   Protein Creatinine Ratio Latest Ref Range: 0.0 - 0.2 Ratio 2.3 (H)     MD aware of all current lab results. Orders received to proceed with treatment per " Dr. Delcid.     Bevacizumab 15 mg/kg IV x 65.3kg = 978mg    Rounded to vial size(within 10%) per dose rounding protocol.               OK to treat with final dose = 1000mg IV     Gemcitabine 800mg/m2 x 1.72m2 = 1376mg    <5% difference,OK to treat with final dose = 1368 mg IV    Carboplatin AUC 4 (34 + 25)  = 236mg    <5% difference, OK to treat with final dose = 228.6mg IV      Collins Torres, PharmD

## 2018-01-22 NOTE — PROGRESS NOTES
Chemotherapy Verification - SECONDARY RN       Height = 163 cm  Weight = 65.3 kg  BSA = 1.719 mg       Medication: Gemzar  Dose: 800 mg/m2  Calculated Dose: 1376 mg                             (In mg/m2, AUC, mg/kg)     Medication: Carboplatin  Dose: AUC 4  Calculated Dose:                              (In mg/m2, AUC, mg/kg)  236.5 mg    Medication:  Avastin  Dose: 15 mg/kg  Calculated dose:  979.5 mg      Carboplatin calculation (if applicable):  (((140-75)*[id number])*(0.70+(1 *0.15))/(1.47*72)+25)*4 * ((1 =1)+(1 =2)) = 236.35 mg mg    I confirm that this process was performed independently.

## 2018-01-22 NOTE — PROGRESS NOTES
Patient arrived to clinic for Gemzar/Carboplatin/Avastin.  Denies any discomfort.  Port accessed using sterile technique, blood return noted.  Labs and UA sent per order.  Dr Delcid called by Tono CHAPMAN regarding creatinine level and urine protein level.  OK given to treat by Dr Delcid.  Pre medication given and Gemzar, Carboplatin, and Avastin given per orders.  Pt tolerated well.  Port flushed, heparin instilled, and de-accessed per policy. Gauze/tape placed.  Next appointment confirmed and pt ambulated out of clinic in no apparent distress.

## 2018-01-22 NOTE — PROGRESS NOTES
Chemotherapy Verification - PRIMARY RN      Height = 163cm  Weight = 65.3kg  BSA = 1.72m2       Medication: Gemzar  Dose: 800mg/m2  Calculated Dose: 1376mg                             (In mg/m2, AUC, mg/kg)     Medication: Carboplatin  Dose: AUC 4  Calculated Dose: 236.35mg                             (In mg/m2, AUC, mg/kg)    Medication: Avastin  Dose: 15mg/kg  Calculated Dose: 979.5mg                             (In mg/m2, AUC, mg/kg)          Carboplatin calculation (if applicable):  (((140-75)*[id number])*(0.70+(1 *0.15))/(1.47*72)+25)*4 * ((1 =1)+(1 =2)) = 236.35      I confirm this process was performed independently with the BSA and all final chemotherapy dosing calculations congruent.  Any discrepancies of 5% or greater have been addressed with the chemotherapy pharmacist. The resolution of the discrepancy has been documented in the EPIC progress notes.

## 2018-01-23 NOTE — TELEPHONE ENCOUNTER
----- Message from Alma Ro sent at 1/23/2018  1:17 PM PST -----  Regarding: should pt keep appt?  Contact: [phone]  IA/Kate    Pt calling to ask if she should keep tomorrow's appt with IA or did the conversation pt had with you on 1/19 suffice.  Please call Rosanna at [phone]    ======================================================================    Called pt, informed pt to keep appt as recommended by Dr Lua from her last OV, pt verbalizes understanding

## 2018-01-24 PROBLEM — I51.7 LVH (LEFT VENTRICULAR HYPERTROPHY): Status: ACTIVE | Noted: 2018-01-01

## 2018-01-24 NOTE — LETTER
Name:          Rosanna Damico   YOB: 1940  Date:     1/24/2018      Madelin Atwood M.D.  [address] [facility] [address]     Renny Lua MD  [address]  [place] [address]  Phone: [phone]  Back Line: [phone]  Fax: [phone]  E-mail: [email or web address]   Dear Dr. Atwood,    We had the pleasure of seeing your patient, Rosanna Damico, in Cardiology Clinic at [facility] today.    As you know, she is a 77-year-old woman with a history of ovarian cancer and labile hypertension with former PRES syndrome as well as left ventricular hypertrophy.    She has no cardiovascular complaints today, and comes in in the aftermath of an episode of severe hypertension when she was admitted to the hospital about one month ago. She has not been regularly taking her blood pressure, and I asked her to do so. Her blood pressure in our office, however, was reasonably controlled at 132 mmHg systolic on her regimen consisting of amlodipine 10 mg by mouth daily, and metoprolol tartrate 50 mg by mouth twice a day. She does not sound to the adherent to her HCTZ, which I had previously prescribed in the setting of lower extremity edema, which she still has. She was worried about worsening of her renal function on that medication, and I addressed that specifically noting that her renal function would likely worsen if we didn't reasonably control her blood pressure. In light of her fluctuating blood pressure, and also her chemotherapy for her ovarian cancer I did repeat an echocardiogram.    I did see an recent lab work a urinalysis consistent with infection related to which I had her submit another sample for urine culture. I will defer treatment of that urinary tract infection to the primary care setting.    Return in about 6 months (around 7/24/2018) for 3 mo with NP, 6 mo with me.    Thank you for the referral and please do not hesitate to contact me at any time. My  contact information is listed above.    This note was dictated using Dragon speech recognition software.     A full note including my physical examination and a full list of rectified medications is available in our medical record, and can be faxed as well.    Renny Lua MD  Cardiologist  [facility]    cc: Mil Delcid MD

## 2018-01-24 NOTE — PATIENT INSTRUCTIONS
For monitoring your palpitations you can consider the [employer] device for you smartphone:          You can learn more about it at this website:    [email or web address]    If you choose to obtain that device, and have strips for me to review, send me a Grockit message and I will provide you with my e-mail.    Renny Lua MD  Cardiologist, [facility] [place]

## 2018-01-25 NOTE — PROGRESS NOTES
Subjective:   Rosanna Damico is a 76-year-old woman with a history of ovarian cancer and labile hypertension with former PRES syndrome as well as left ventricular hypertrophy.     She has no cardiovascular complaints today, and specifically no palpitations, which she previously has associated with the initiation of chemotherapy for her ovarian cancer.    She does tell me that she is starting a new and reasonably aggressive chemotherapy regimen in association with her gynecologic oncologist, Mil Delcid M.D.    She also comes in in the aftermath of a hospitalization for pneumonia at which time she was treated with a liter of normal saline at which time her blood pressure spiked up to 200 mmHg. She otherwise tells me that she has not had spikes and her blood pressure that high and has not required her clonidine though she is only been rigorously taking her blood pressure for the last 5 days in anticipation of this visit, and did not produce a log for review today.    Past Medical History:   Diagnosis Date   • Cancer (CMS-[facility]) 1/1/2011    ovarian   • Hand fracture, right 2009   • Heart murmur     cardiologist [place], released to yearly visits   • Hiatus hernia syndrome     unaware she has, showed up on CT scan   • Hypertension    • OSTEOPOROSIS    • Other specified disorder of intestines     due chemo   • Other specified symptom associated with female genital organs     ovarian cancer   • PRES (posterior reversible encephalopathy syndrome) 11/2013    caused by cisplatin, one seizure, no current issues   • Recurrent UTI 10/12/2017   • Renal disorder     hydronephrosis current 2016, elevated creatinine and BUN with Cisplatin 2012   • Seizure (CMS-[facility]) 11/2013    due to Posterior Reversable Encephalopathy syndrome, from cisplatin   • Unspecified disorder of thyroid     hypothyroid     Past Surgical History:   Procedure Laterality Date   • ABDOMINAL EXPLORATION     • ABDOMINAL HYSTERECTOMY TOTAL  02/2011   • CATH  "PLACEMENT  11/8/2012    Performed by Mil Delcid M.D. at [facility]   • CATH REMOVAL  1/9/2014    Performed by Mil Delcid M.D. at [facility]   • CYSTOSCOPY STENT PLACEMENT Right 2/5/2016    Procedure: CYSTOSCOPY STENT PLACEMENT;  Surgeon: Mil Delcid M.D.;  Location: [facility];  Service:    • CYSTOSCOPY STENT PLACEMENT Right 10/6/2016    Procedure: CYSTOSCOPY STENT PLACEMENT;  Surgeon: Mil Delcid M.D.;  Location: [facility];  Service:    • CYSTOSCOPY STENT PLACEMENT Right 4/20/2017    Procedure: CYSTOSCOPY STENT PLACEMENT FOR URETERAL STENT CHANGE;  Surgeon: Mil Delcid M.D.;  Location: [facility];  Service:    • DEBULKING  1/9/2014    Performed by Mil Delcid M.D. at [facility]   • GYN SURGERY  9/2012    major pelvic surgery   • OTHER  2/07/2011    hysterectomy   • OTHER  1987    thyroid lobectomy   • OTHER ABDOMINAL SURGERY  9/2012    colon resection, omentectomy, lymph node dissection, IP port    • OTHER ORTHOPEDIC SURGERY  2010    hand fracture   • PELVISCOPY ROBOTIC  1/9/2014    Performed by Mil Delcid M.D. at [facility]   • RECOVERY  7/1/2015    Procedure: CT-CT Guided retroperitoneal lymph node biopsy-;  Surgeon: [facility];  [place] [facility] [place]  Service:    • RETROGRADES  2/5/2016    Procedure: RETROGRADES;  Surgeon: Mil Delcid M.D.;  Location: [facility];  Service:      Family History   Problem Relation Age of Onset   • Heart Disease Mother    • Cancer Father      Rectal and Lung   • Lung Disease Father    • Heart Disease Brother    • Psychiatry Brother      Schizophrenic   • Cancer Paternal Aunt      History   Smoking Status   • Never Smoker   Smokeless Tobacco   • Never Used     Allergies   Allergen Reactions   • Cisplatin Unspecified     \"sever kidney reaction when received IP chemo\"  GZB=6122   • Codeine Itching and Vomiting     RXN=>10 years ago " "    Outpatient Encounter Prescriptions as of 1/24/2018   Medication Sig Dispense Refill   • hydrochlorothiazide (HYDRODIURIL) 25 MG Tab Take 1 Tab by mouth every day. 30 Tab 3   • levothyroxine (SYNTHROID) 75 MCG Tab Take 1 Tab by mouth every day. One tablet daily 2 tabs on Sundays 90 Tab 3   • metoprolol (LOPRESSOR) 25 MG Tab Take 2 Tabs by mouth 2 times a day. 180 Tab 2   • amlodipine (NORVASC) 10 MG Tab Take 1 Tab by mouth every day. 90 Tab 3   • B Complex-Folic Acid (B COMPLEX PLUS PO) Take 1 Tab by mouth every day.     • Magnesium 500 MG TABS Take 1 Tab by mouth 2 Times a Day.     • clonidine (CATAPRES) 0.1 MG Tab Take 1 Tab by mouth 3 times a day. As needed for SBP >180 90 Tab 3   • acetaminophen (TYLENOL) 500 MG Tab Take 500-1,000 mg by mouth every 6 hours as needed.       No facility-administered encounter medications on file as of 1/24/2018.      Review of Systems   Cardiovascular: Positive for leg swelling (related to known lymph node involvement of ovarian cancer). Negative for palpitations.        No palpitations since our last visit        Objective:   /60   Pulse 61   Ht 1.626 m (5' 4\")   Wt 66.2 kg (146 lb)   SpO2 92%   BMI 25.06 kg/m²     Physical Exam   Constitutional: She is oriented to person, place, and time. She appears well-developed and well-nourished. No distress.   Pleasant, elderly woman in no distress   HENT:   Head: Normocephalic and atraumatic.   Eyes: Conjunctivae and EOM are normal. Pupils are equal, round, and reactive to light. No scleral icterus.   Neck: Neck supple. No JVD present. No tracheal deviation present.   Cardiovascular: Normal rate, regular rhythm, normal heart sounds and intact distal pulses.  Exam reveals no gallop and no friction rub.    No murmur heard.  Pulses:       Dorsalis pedis pulses are 2+ on the right side, and 2+ on the left side.   No carotid bruits   Pulmonary/Chest: Effort normal and breath sounds normal. No stridor. No respiratory distress. She " "has no wheezes. She has no rales.   Abdominal: Soft. Bowel sounds are normal. She exhibits no distension.   Musculoskeletal: She exhibits edema (1+ RLE, trace LLE edema).   Neurological: She is alert and oriented to person, place, and time.   Skin: Skin is warm and dry. No rash noted. She is not diaphoretic. No erythema. No pallor.   Psychiatric: She has a normal mood and affect. Judgment and thought content normal.   Vitals reviewed.    Lab Results   Component Value Date/Time    WBC 5.8 01/22/2018 09:45 AM    RBC 3.96 (L) 01/22/2018 09:45 AM    HEMOGLOBIN 11.3 (L) 01/22/2018 09:45 AM    HEMATOCRIT 35.7 (L) 01/22/2018 09:45 AM    MCV 90.2 01/22/2018 09:45 AM    MCH 28.5 01/22/2018 09:45 AM    MCHC 31.7 (L) 01/22/2018 09:45 AM    MPV 9.0 01/22/2018 09:45 AM        Lab Results   Component Value Date/Time    SODIUM 136 01/22/2018 09:45 AM    POTASSIUM 4.8 01/22/2018 09:45 AM    CHLORIDE 106 01/22/2018 09:45 AM    CO2 24 01/22/2018 09:45 AM    GLUCOSE 103 (H) 01/22/2018 09:45 AM    BUN 33 (H) 01/22/2018 09:45 AM    CREATININE 1.47 (H) 01/22/2018 09:45 AM        Lab Results   Component Value Date/Time    ASTSGOT 22 01/22/2018 09:45 AM    ALTSGPT 13 01/22/2018 09:45 AM        Lab Results   Component Value Date/Time    CHOLSTRLTOT 190 07/01/2016 09:30 AM     (H) 07/01/2016 09:30 AM    HDL 58 07/01/2016 09:30 AM    TRIGLYCERIDE 120 07/01/2016 09:30 AM       Echocardiogram, 6/13/2016:  \"CONCLUSIONS  Normal left ventricular systolic function, EF 65%.   Mild concentric left ventricular hypertrophy.   Trace to mild mitral regurgitation.  Mild aortic insufficiency.   Trace to mild tricuspid regurgitation.   Estimated right ventricular systolic pressure  is 33 mmHg.  No prior study is available for comparison\"    Assessment:     1. Renovascular hypertension  Echocardiogram Comp w/o Cont   2. Encounter for antineoplastic chemotherapy  Echocardiogram Comp w/o Cont   3. LVH (left ventricular hypertrophy)  Echocardiogram Comp " w/o Cont   4. Acute cystitis without hematuria  URINE CULTURE       Medical Decision Making:  Today's Assessment / Status / Plan:     She has no cardiovascular complaints today, and comes in in the aftermath of an episode of severe hypertension when she was admitted to the hospital about one month ago. She has not been regularly taking her blood pressure, and I asked her to do so. Her blood pressure in our office, however, was reasonably controlled at 132 mmHg systolic on her regimen consisting of amlodipine 10 mg by mouth daily, and metoprolol tartrate 50 mg by mouth twice a day. She does not sound to the adherent to her HCTZ, which I had previously prescribed in the setting of lower extremity edema, which she still has. She was worried about worsening of her renal function on that medication, and I addressed that specifically noting that her renal function would likely worsen if we didn't reasonably control her blood pressure. In light of her fluctuating blood pressure, and also her chemotherapy for her ovarian cancer I did repeat an echocardiogram.    Renny Lua MD  Cardiologist, [facility] [place]     Return in about 6 months (around 7/24/2018) for 3 mo with NP, 6 mo with me.

## 2018-01-29 NOTE — PROGRESS NOTES
Patient here for Day 8 Gemzar. Urine Culture collected on 1/24/18 showed no signs of infection. Discussed with patient the importance of hydration and to drinking at least 2 litters of water. Patient verbalized understanding, but states that it is difficult. Port accessed using sterile technique; labs drawn. Labs reviewed; platelets = 90K. Dr. Delcid's office notified of platelets = 90K. Order received to proceed with treatment today. Pre-medication given per MAR. Gemzar given per MAR. Heparin instilled prior to de-accessing port. Port de-accessed; gauze/tape applied over site. Next appointment scheduled. Discharged to self care; no apparent distress noted.

## 2018-01-29 NOTE — PROGRESS NOTES
"Pharmacy Chemotherapy Verification    Patient Name: Rosanna Damico  Dx: Ovarian Cancer        Cycle 1, Day 8   - resuming treatment after re-evaluation per MD.     Previous treatment: C1D1 = 01/22/18; s/p C4D8 on 11/25/17    Protocol: CARBOplatin + Gemcitabine + Avastin      *Dosing Reference*   Carboplatin (Paraplatin) AUC 4 IV once on day 1  Gemcitabine (Gemzar) 1000 mg/m2 IV once per day on days 1 & 8  -reduced to 800mg/m2 per MD   Bevacizumab (Avastin) 15 mg/kg IV once on day 1  21-day cycle for 6 to 10 cycles, based on response; after completing these cycles of carboplatin, gemcitabine, and bevacizumab, patients continue on bevacizumab maintenance until progression of disease or unacceptable toxicity     Agustín PHILLIPS et al. Gemcitabine Plus Carboplatin Compared With Carboplatin in Patients With Platinum-Sensitive Recurrent Ovarian Cancer: An Intergroup Trial of the AGO-OVAR, the [facility] CTG, and the EORTC GCG. JCO Vol 24, No 29 (October 10), 2006: pp. 2961-2646     Rayne SANTIAGO et al.  Final overall survival and safety analysis of OCEANS, a phase 3 trial of chemotherapy with or without bevacizumab in patients with platinum-sensitive recurrent ovarian cancer. Gynecol Oncol. 2015 Oct;139(1):10-6. Epub 2015 Aug 10     Allergies:  Cisplatin and Codeine      /59   Pulse 61   Temp 36.4 °C (97.5 °F)   Resp 18   Ht 1.63 m (5' 4.17\")   Wt 67.7 kg (149 lb 4 oz)   SpO2 99%   BMI 25.48 kg/m²  Body surface area is 1.75 meters squared.    Labs 01/29/18  ANC~ 2300  Plt = 90k (Ok to proceed per Dr. Delcid)   Hgb = 10       Labs 01/22/18  SCr = 1.47mg/dL CrCl ~ 34mL/min LFT's = WNL  TBili = 0.4        Gemcitabine (Gemzar) 800mg/m2 x 1.72m2 = 1400 mg    <5% difference,OK to treat with final dose = 1368 mg IV       Kami Rivera, PharmD, BCOP      "

## 2018-01-29 NOTE — PROGRESS NOTES
"Pharmacy chemotherapy verification:    Patient Name: Rosanna Damico  Dx: recurrent ovarian cancer          Protocol: carboplatin/gemcitabine + bevacizumab     *Dosing Reference*  Carboplatin (Paraplatin) AUC 4 IV once on day 1  Gemcitabine (Gemzar) 1000 mg/m2 IV once per day on days 1 & 8 - Bhavin NICOLE reduced dose to 800 mg/m2 starting with C5D1 on 1/22/18 due to tolerance   Bevacizumab (Avastin) 15 mg/kg IV once on day 1, given first  - Avastin HELD 11/18/17= C4D1 due to proteinuria  21-day cycle for 6 to 10 cycles, based on response; after completing these cycles of carboplatin, gemcitabine, and bevacizumab, patients continue on bevacizumab maintenance until progression of disease or unacceptable toxicity   Rayne SANTIAGO, et al- [facility]-  J Clin Oncol. 2012 Clif 10; 30(17): 4154-8761.     Allergies:  Cisplatin and Codeine     /59   Pulse 61   Temp 36.4 °C (97.5 °F)   Resp 18   Ht 1.63 m (5' 4.17\")   Wt 67.7 kg (149 lb 4 oz)   SpO2 99%   BMI 25.48 kg/m²  Body surface area is 1.75 meters squared.     Labs 1/29/18:  ANC~ 2280 Plt = 90k (OK on 1/29/18 per Bhavin NICOLE)   Hgb = 10.0    1/22/18  SCr = 1.47 mg/dL CrCl ~ 34 mL/min   LFT's/TBili =WNL/0.4   K+ = 4.8  Urine protein: 300 mg/dL (3+) -Bhavin NICOLE aware of Urine Protein and UPCR below, orders received to treat as ordered.    1/16/18: Mag = 2.2    1/18/18 Urine: Creatinine: 46.13 mg/dL;  Total protein: 106.3 mg/dL; UPCR: 2.3 g/g     9/6/17 Carboplatin skin test: no reaction     Drug Order   (Drug name, dose, route, IV Fluid & volume, frequency, number of doses) Cycle: C5D8 ([place] Cycle 1)     Previous treatment: C5 D1 = 1/22/18  carboplatin skin test 9/6/17;      Medication = Gemcitabine  Base Dose= 800 mg/m2   Calc Dose: Base Dose x 1.75 m2 = 1400 mg  Final Dose = 1368 mg  Route = IV  Fluid & Volume =  mL  Admin Duration = Over 30min          <5% difference, OK to treat with final dose     By my signature below, I confirm this process was performed " independently with the BSA and all final chemotherapy dosing calculations congruent. I have reviewed the above chemotherapy order and that my calculation of the final dose and BSA (when applicable) corroborate those calculations of the  pharmacist. Discrepancies of 5% or greater in the written dose have been addressed and documented within the EPIC Progress notes.    Jj Hugo, PharmD

## 2018-01-29 NOTE — PROGRESS NOTES
Chemotherapy Verification - PRIMARY RN      Height = 163 cm  Weight = 67.7 kg  BSA = 1.75 m^2       Medication: Gemzar  Dose: 800 mg/m^2  Calculated Dose: 1400 mg                             (In mg/m2, AUC, mg/kg)     I confirm this process was performed independently with the BSA and all final chemotherapy dosing calculations congruent.  Any discrepancies of 5% or greater have been addressed with the chemotherapy pharmacist. The resolution of the discrepancy has been documented in the EPIC progress notes.

## 2018-01-29 NOTE — PROGRESS NOTES
Chemotherapy Verification - SECONDARY RN       Height = 64.17 inches  Weight = 67.7 kg  BSA = 1.75 m2       Medication: Gemzar  Dose: 800 mg/m2  Calculated Dose: 1400 mg                              (In mg/m2, AUC, mg/kg)     I confirm that this process was performed independently.

## 2018-01-29 NOTE — TELEPHONE ENCOUNTER
----- Message from Renny Lua M.D. sent at 1/29/2018  8:03 AM PST -----  Contaminated sample. No sign of infection.    IBA MD    ===========================================================    Attempted to call pt, no answer, left vm to call back

## 2018-02-01 NOTE — TELEPHONE ENCOUNTER
Telephone call from Mell.  States she developed an itchy rash which woke her up at 0230 Tuesday morning.  Received chemotherapy on Monday.  Spoke with PHONG Rinaldi at [facility].   Relayed Nimco's recommendations to Mell: Benedryl or Claritin PO, Benedryl cream or can use anti-itch cream with zinc. Encouraged to keep skin moisturized to prevent atropic dermatitis. Pt verbalized understanding.

## 2018-02-12 NOTE — PROGRESS NOTES
Pt to infusion services ambulatory per self.  Pt here for scheduled lab work prior to f/u appointment with Dr. Delcid and chemotherapy scheduled for tomorrow.  Plan of care reviewed.  Pt verbalizes understanding.  Pt with right chest port.  Port site cleansed and port accessed in sterile fashion.  Port flushes easily; + brisk blood return verified.  Labs drawn from port per MD orders.  Port flushed with normal saline and heparin per policy.  Port remains accessed for chemotherapy tomorrow per patient request.  Pt provided urine sample for ordered protein/creatinine ratio.  All samples sent to lab.  Pt released to self care in no apparent distress after completion of treatment, ambulatory.  Pt to return tomorrow; appointment scheduled.

## 2018-02-13 NOTE — PROGRESS NOTES
"Pharmacy Chemotherapy Verification    Patient Name: Rosanna Damico  Dx: Ovarian Cancer        Cycle 2, Day 1   - resuming treatment after re-evaluation per MD.     Previous treatment: C1D8 = 01/29/18    Protocol: CARBOplatin + Gemcitabine + Avastin      *Dosing Reference*   Carboplatin (Paraplatin) AUC 4 IV once on day 1  Gemcitabine (Gemzar)  IV once per day on days 1 & 8     --reduced to 800 mg/m2 per MD   Bevacizumab (Avastin) 15 mg/kg IV once on day 1  21-day cycle for 6 to 10 cycles, based on response; after completing these cycles of carboplatin, gemcitabine, and bevacizumab, patients continue on bevacizumab maintenance until progression of disease or unacceptable toxicity     Agustín PHILLIPS et al. Gemcitabine Plus Carboplatin Compared With Carboplatin in Patients With Platinum-Sensitive Recurrent Ovarian Cancer: An Intergroup Trial of the AGO-OVAR, the [facility] CTG, and the EORTC GCG. JCO Vol 24, No 29 (October 10), 2006: pp. 2936-8376     Rayne SANTIAGO, et al.  Final overall survival and safety analysis of OCEANS, a phase 3 trial of chemotherapy with or without bevacizumab in patients with platinum-sensitive recurrent ovarian cancer. Gynecol Oncol. 2015 Oct;139(1):10-6. Epub 2015 Aug 10     Allergies:  Cisplatin and Codeine      /59   Pulse 68   Temp 37 °C (98.6 °F)   Resp 18   Ht 1.63 m (5' 4.17\")   Wt 69.3 kg (152 lb 12.5 oz)   SpO2 98%   BMI 26.08 kg/m²  Body surface area is 1.77 meters squared.    Labs 2/12/18:  ANC~ 2170   Plt = 371 k     Hgb = 8.3     SCr = 2.79 mg/dL  CrCl ~ 19 mL/min    AST/ALT/AP = 24/20/94 TBili = 0.4   UPCR = 2.3              Therapy deferred for today.  Give hydration only.      Natividad Yen, PharmD      "

## 2018-02-13 NOTE — PROGRESS NOTES
Pt present ambulatory to IS for hydration/labs.  POC discussed, confirmed with Dr. Delcid that chemotherapy is being deferred until further notice d/t kidney function.  Pt reports significant fatigue today.  R chest port in place accessed yesterday in [facility].  Blood return verified.  2 L NS infused per orders, tolerated well.  CMP drawn and sent to lab for evaluation.  Port flushed, de-accessed, and site covered with sterile gauze and tape.  No additional appointments needed at this time until new POC developed with MD.  Pt discharged from IS in NAD under care of spouse.

## 2018-03-05 NOTE — PROGRESS NOTES
"Pt returns for blood draw from implanted port.  Port accessed in a sterile fashion w/20g 3/4\" NCN, blood drawn as ordered.  Port flushed per heparin protocol, NCN remains in place for use tomorrow.  Pt dcd to self care in no apparent distress.  " n/a

## 2018-03-08 NOTE — OR SURGEON
Immediate Post OP Note    PreOp Diagnosis: right ureteral obstruction, ovarian cancer    PostOp Diagnosis: same    Procedure(s):  CYSTOSCOPY STENT PLACEMENT/ FOR URETERAL STENT EXCHANGE - Wound Class: Clean Contaminated    Surgeon(s):  Mil Delcid M.D.    Anesthesiologist/Type of Anesthesia:  Anesthesiologist: Jj Coker M.D./General    Surgical Staff:  Circulator: Lisbet Valdivia R.N.  Scrub Person: Socorro Zaldivar    Specimens: none    Estimated Blood Loss: minimal    Findings: right ureteral obstruction    Complications: none        3/8/2018 1:11 PM Mil Delcid

## 2018-03-08 NOTE — DISCHARGE INSTRUCTIONS
ACTIVITY: Rest and take it easy for the first 24 hours.  A responsible adult is recommended to remain with you during that time.  It is normal to feel sleepy.  We encourage you to not do anything that requires balance, judgment or coordination.    MILD FLU-LIKE SYMPTOMS ARE NORMAL. YOU MAY EXPERIENCE GENERALIZED MUSCLE ACHES, THROAT IRRITATION, HEADACHE AND/OR SOME NAUSEA.    FOR 24 HOURS DO NOT:  Drive, operate machinery or run household appliances.  Drink beer or alcoholic beverages.   Make important decisions or sign legal documents.    SPECIAL INSTRUCTIONS:   Cystoscopy, Care After  Refer to this sheet in the next few weeks. These instructions provide you with information on caring for yourself after your procedure. Your caregiver may also give you more specific instructions. Your treatment has been planned according to current medical practices, but problems sometimes occur. Call your caregiver if you have any problems or questions after your procedure.  HOME CARE INSTRUCTIONS   Things you can do to ease any discomfort after your procedure include:  · Drinking enough water and fluids to keep your urine clear or pale yellow.  · Taking a warm bath to relieve any burning feelings.  SEEK IMMEDIATE MEDICAL CARE IF:   · You have an increase in blood in your urine.  · You notice blood clots in your urine.  · You have difficulty passing urine.  · You have the chills.  · You have abdominal pain.  · You have a fever or persistent symptoms for more than 2-3 days.  · You have a fever and your symptoms suddenly get worse.  MAKE SURE YOU:   · Understand these instructions.  · Will watch your condition.  · Will get help right away if you are not doing well or get worse.     This information is not intended to replace advice given to you by your health care provider. Make sure you discuss any questions you have with your health care provider.    DIET: To avoid nausea, slowly advance diet as tolerated, avoiding spicy or  greasy foods for the first day.  Add more substantial food to your diet according to your physician's instructions.  Babies can be fed formula or breast milk as soon as they are hungry.  INCREASE FLUIDS AND FIBER TO AVOID CONSTIPATION.    SURGICAL DRESSING/BATHING: n/a    FOLLOW-UP APPOINTMENT:  A follow-up appointment should be arranged with your doctor in 1-2 weeks; call to schedule.    You should CALL YOUR PHYSICIAN if you develop:  Fever greater than 101 degrees F.  Pain not relieved by medication, or persistent nausea or vomiting.  Excessive bleeding (blood soaking through dressing) or unexpected drainage from the wound.  Extreme redness or swelling around the incision site, drainage of pus or foul smelling drainage.  Inability to urinate or empty your bladder within 8 hours.  Problems with breathing or chest pain.    You should call 911 if you develop problems with breathing or chest pain.  If you are unable to contact your doctor or surgical center, you should go to the nearest emergency room or urgent care center.  Physician's telephone #: [phone]    If any questions arise, call your doctor.  If your doctor is not available, please feel free to call the Surgical Center at [phone].  The Center is open Monday through Friday from 7AM to 7PM.  You can also call the [facility] HOTLINE open 24 hours/day, 7 days/week and speak to a nurse at [phone], or toll free at [phone].    A registered nurse may call you a few days after your surgery to see how you are doing after your procedure.    MEDICATIONS: Resume taking daily medication.  Take prescribed pain medication with food.  If no medication is prescribed, you may take non-aspirin pain medication if needed.  PAIN MEDICATION CAN BE VERY CONSTIPATING.  Take a stool softener or laxative such as senokot, pericolace, or milk of magnesia if needed.    Last pain medication given at 1:45PM.    If your physician has prescribed pain medication that  includes Acetaminophen (Tylenol), do not take additional Acetaminophen (Tylenol) while taking the prescribed medication.    Depression / Suicide Risk    As you are discharged from this [facility] facility, it is important to learn how to keep safe from harming yourself.    Recognize the warning signs:  · Abrupt changes in personality, positive or negative- including increase in energy   · Giving away possessions  · Change in eating patterns- significant weight changes-  positive or negative  · Change in sleeping patterns- unable to sleep or sleeping all the time   · Unwillingness or inability to communicate  · Depression  · Unusual sadness, discouragement and loneliness  · Talk of wanting to die  · Neglect of personal appearance   · Rebelliousness- reckless behavior  · Withdrawal from people/activities they love  · Confusion- inability to concentrate     If you or a loved one observes any of these behaviors or has concerns about self-harm, here's what you can do:  · Talk about it- your feelings and reasons for harming yourself  · Remove any means that you might use to hurt yourself (examples: pills, rope, extension cords, firearm)  · Get professional help from the community (Mental Health, Substance Abuse, psychological counseling)  · Do not be alone:Call your Safe Contact- someone whom you trust who will be there for you.  · Call your local CRISIS HOTLINE 516-7714 or 927-652-7051  · Call your local Children's Mobile Crisis Response Team [place] (298) 226-3402 or www.[facility].com  · Call the toll free National Suicide Prevention Hotlines   · National Suicide Prevention Lifeline 792-887-ZDMS (2595)  · National Hope Line Network 800-SUICIDE (296-1456)

## 2018-03-08 NOTE — PROGRESS NOTES
The Medication Reconciliation process has been completed by interviewing the patient    Allergies have been reviewed  Antibiotic use in 30 days - none    Home Pharmacy:  [facility]

## 2018-03-15 NOTE — OP REPORT
DATE OF SERVICE:  03/08/2018    PREOPERATIVE DIAGNOSES:  1.  Right ureteral obstruction.  2.  Status post right ureteral stent placement.  3.  Persistent ovarian cancer.    POSTOPERATIVE DIAGNOSES:  1.  Right ureteral obstruction.  2.  Status post right ureteral stent placement.  3.  Persistent ovarian cancer.    PROCEDURE PERFORMED:  Cystoscopy with removal of right ureteral stent and   exchange of a new 6x22 double-J ureteral stent.    SURGEON:  Mil Delcid MD    ASSISTANT:  None.    ANESTHESIA:  General.    ANESTHESIOLOGIST:  Jj Coker MD    ESTIMATED BLOOD LOSS:  Minimal.    COMPLICATIONS:  None.    COUNTS:  Final sponge and needle counts correct.    INDICATIONS FOR SURGERY:  The patient is a pleasant 77-year-old female who is   well known to me.  Patient has history of persistent ovarian cancer resulting   in right malignant ureteral obstruction.  The patient is requiring an ureteral   stent change as her last stent exchange was back in October 2017.  She is   brought to the operating room today for removal of the ureteral stent and   placing a new ureteral stents.    PROCEDURE NOTE:  Patient was given IV antibiotics prior to procedure.  Patient   was prepped and draped and placed in modified dorsal lithotomy position.  A   30-degree cystoscope was inserted transurethrally.  The right ureteral stent   was retrieved easily and brought out the urethral orifice.  Under direct   fluoroscopy, I then intubated the right ureteral stent.  A guidewire was   placed through there to hold my position in the right ureteral orifice.  A   retrograde pyelogram again revealed persistent ureteral stricture.  The old   ureteral stent was then subsequently removed.  I then backloaded the guidewire   and then the cystoscope was then inserted in position.  After the guidewire   was noted to be in the right renal pelvis, the retrograde pyelogram   highlighted the renal pelvis.  The right kidney was about the level of the    L3-L4 level.  I then placed a new right ureteral stent via the guidewire.  The   proximal ureteral J catheter was in the renal pelvis.  This was in good   position.  Once this was confirmed, the guidewire was withdrawn and the   cystoscope was withdrawn.  The bladder was emptied.  The patient tolerated   procedure well without any difficulties and was extubated and transferred to   the PACU in stable condition.       ____________________________________     MD REYNA WOODARD / NATHAN    DD:  03/15/2018 10:48:47  DT:  03/15/2018 11:25:03    D#:  [id number]  Job#:  [id number]

## 2018-05-04 NOTE — TELEPHONE ENCOUNTER
----- Message from Natali Smith sent at 5/4/2018  3:20 PM PDT -----  Regarding: wants echo done in [place]  IA/Kate      Patient can't get echo done for a couple weeks, she wants to know if she can get it done in [place]. She would like a call back at [phone].    ==================================================================    Called pt, pt reports she want the Echo done at [facility] as it is closer to her, informed pt that it should be ok and will just fax them the Echo order and instructed pt to call them to schedule, pt appreciative and verbalizes understanding    Echo order faxed to [facility], F#[phone]

## 2018-05-04 NOTE — TELEPHONE ENCOUNTER
----- Message from Alma DIEGOJesus Jia sent at 5/4/2018  8:34 AM PDT -----  Regarding: severe swelling   Contact: [phone]  IA/iraida    Pt calling to report swelling on top of feet, ankles, it moves up legs to knees thru out the day. Oncologist calls it lymphedema - swelling is also in her face where upon awakening eyes almost swollen shut.  Pt asking if it could be caused by metoprolol or other cardiac med.    Please call pt at .    ============================================================    Discussed above concern with Dr Tobar-ADD, Dr Renny Lua unavail, Dr Tobar reviewed pt's chart and per Dr Tobar: Stopped Metoprolol and Amlodipine as this could contribute on pt's edema, start pt on Carvedilol 25mg PO BID.     Called pt, discussed above recommendations by Dr Tobar, pt agreed and verbalizes, advise pt also to get her Echo done ASAP ordered by Dr Lua from her last OV, instructed pt that if swelling is not better in 1-2 weeks after stopping meds to give our office a call back, pt understands.     New Rx for Carvedilol 25mg PO BID sent to [facility], called them to DC Amlodipine and Metoprolol, MAR updated.     FYI to Dr Lua

## 2018-05-09 NOTE — TELEPHONE ENCOUNTER
Pt called back, pt reports swelling is better, and she started taking Carvedilol last Monday and her BP today is actually 115/60, she will try to get the Echo done at [place] and requested for Echo order to be mailed to her, pt appreciative of call.     Echo order mailed to pt.

## 2018-05-09 NOTE — TELEPHONE ENCOUNTER
Kate,    Can you follow-up with Mrs. Damico and see how her swelling is.  Please let her know that I primarily suspect that this is due to anemia and low protein levels as well as poor kidney function in the setting of her end-stage ovarian cancer.      I will review the echocardiogram as well when it is done.    ESTEFANÍA NICOLE

## 2018-05-23 NOTE — TELEPHONE ENCOUNTER
DELMAR Berrios/Kate     Patient called to let Dr Lua know that she had her Echo done today at [facility].      ============================================    Echo result pending

## 2018-05-29 NOTE — TELEPHONE ENCOUNTER
Echo result reviewed and discussed w/ Dr Lua, per Dr Lua R sided pressure is elevated, small pericardial effusion, normal heart function, no concerns.     Called pt and notified, pt verbalizes understanding

## 2018-06-20 NOTE — PROGRESS NOTES
Direct Admit from Dr. Mixon at [facility]. Dr. iMl Delcid is accepting for Acute Renal Urethral Stent Obstruction, Hydronephrosis. All orders will need to be released upon patient arrival. Patient arriving via ground transport.

## 2018-06-20 NOTE — PROGRESS NOTES
Direct Admit from Dr. Mixon at [facility]. Dr. Mil Delcid is accepting for Acute Renal Urethral Stent Obstruction, Hydronephrosis. ADT signed held and all orders will need to be released upon patient arrival. Patient arriving via private vehicle.

## 2018-06-21 NOTE — PROGRESS NOTES
"Pt states right IV tenderness at times. Flushes well, however it is on Right AC. RUE placed on pillow for comfort. Offered to access right anterior chest port but refusing right now \"im okay\".   "

## 2018-06-21 NOTE — PROGRESS NOTES
Right AC IV leaking. I did offer to access port again, this time patient agrees. Able to access port with brisk blood during aspiration without difficulty.

## 2018-06-21 NOTE — H&P
DATE OF ADMISSION:  06/20/2018    REASON FOR ADMISSION:  Acute renal failure.    HISTORY OF PRESENT ILLNESS:  The patient is a pleasant 77-year-old female who   is well known to me.  Patient has a history of ovarian cancer initially   diagnosed in 2011.  She has had multiple surgeries followed by multiple cycles   of chemotherapy.  She has recurrent ovarian cancer and also a right ureteral   obstruction secondary to malignant obstruction.  Patient also has history of   kidney failure along with hypertension.  She is currently not on any active   treatment for her ovarian cancer.  The last right ureteral stent was changed   on 03/08/2018.  Patient was in her usual state of health until she presented   to [facility] ER with not feeling well.  The patient was   evaluated there in the emergency room and was found to have an acute renal   failure with a creatinine of 3.56.  A CT scan had been performed and it   appears that she has a hydro on the right.  They suspected that her right   ureteral stent may be occluded.  Thus her care was subsequently transferred to   [facility] for right ureteral stent change.    REVIEW OF SYSTEMS:  A 14-point review of system is unremarkable with the   exception of what has been described on HPI.    ALLERGIES:  No known drug allergies.    PAST MEDICAL HISTORY:  Significant for hypertension, hypothyroidism, ovarian   cancer and also right ureteral obstruction.    PAST SURGICAL HISTORY:  Significant for cytoreductive surgery, hand surgery,   partial thyroidectomy, appendectomy, multiple ureteral stent change, last   changed on 03/08/2018.    FAMILY HISTORY:  Significant for colon cancer.  No history of ovarian cancer   or uterine cancer.    SOCIAL HISTORY:  She denies any history of smoking, drug use or alcohol use.    PHYSICAL EXAMINATION:  VITAL SIGNS:  Stable.  She is afebrile.  GENERAL APPEARANCE:  A well-developed, well-nourished female, in no obvious   acute  distress.  HEENT:  Normocephalic and atraumatic.  Sclerae is anicterus.  NECK:  Supple.  No thyroid masses noted.  No supraclavicular adenopathy   appreciated.  HEART:  Regular rate and rhythm.  Normal S1 and S2.  No murmur.  No S3 or S4.  LUNGS:  Clear to auscultation.  ABDOMEN:  Soft, nondistended, and nontender.  No obvious masses appreciated.  PELVIC:  Deferred.  EXTREMITIES:  Shows no clubbing, cyanosis, or edema.  Nontender.  NEUROLOGIC:  Grossly intact.    LABORATORY TESTS:  On 06/20/2018, white blood cell count 7000, hemoglobin of   9.9 and normal platelet counts.  Electrolytes are unremarkable.  Her potassium   is 5.6, BUN is 51 with a creatinine of 30.    IMPRESSION:  1.  Acute renal insufficiency with a creatinine of 3.0, suspect blockage of a   right ureteral stent.  The patient's last stent was changed in March.  She is   due for a change.  2.  End-stage ovarian cancer, has not received treatment for several months.    PLAN:  1.  Accept a transfer from [facility] to [facility] for a stent change.  2.  Patient has been consented for ureteral stent exchange tomorrow morning.       ____________________________________     MD REYNA WOODARD / NATHAN    DD:  06/21/2018 01:06:25  DT:  06/21/2018 01:49:10    D#:  [id number]  Job#:  [id number]

## 2018-06-21 NOTE — PROGRESS NOTES
Report received, pt care assumed. Pt ambulate to restroom with steady gait. Pt denies any additional needs at this time. Will continue to monitor.

## 2018-06-21 NOTE — PROGRESS NOTES
RN assist. Called admitting re: receiving faxed orders from Dr. Delcid. No orders received per admitting. Dr. Delcid paged via answering service, spoke with Dr. Delcid who stated he will place orders. Primary RN made aware.

## 2018-06-21 NOTE — CARE PLAN
Problem: Pain Management  Goal: Pain level will decrease to patient's comfort goal  Outcome: PROGRESSING AS EXPECTED  Reports adequate pain management. Resting and calm right now.     Problem: Respiratory:  Goal: Respiratory status will improve  Outcome: MET Date Met: 06/21/18  Pt on RA. Respirations equal and unlabored. No sob.

## 2018-06-21 NOTE — OR NURSING
"Pt. Verbalized that pain is\"better and tolerable\"BP is WNL after x2 doses of PRN med.for BPS>160 given.Taking water well without N/V.  "

## 2018-06-22 NOTE — PROGRESS NOTES
Mil Delcid MD at pt bedside.Instructed to give one time dose of hydralazine, medication ordered. Shift report given to ADELA Renae.

## 2018-06-22 NOTE — PROGRESS NOTES
Patient seen. Feeling much better. BP improved. Making good urine.   VSS afebrile   Exam unchanged.   Spoke with Dr. Hillman and he had seen her in the hospital and patient and him agree with outpt followup.   Crt improved.   She is stable will d/c home.

## 2018-06-22 NOTE — DISCHARGE PLANNING
Care Transition Team Assessment    Met with pt at bedside. According to pt she lives with her , prior to this admission pt was independent for showering, dressing and managing medications.  will shop and cook. Pt said that she walks short distances. Medications are filled at [facility] as pt has no insurance for medication coverage at this time although pt said she will add medication coverage should she need too.  to provide transport at discharge.    Information Source  Orientation : Oriented x 4  Information Given By: Patient  Who is responsible for making decisions for patient? : Patient    Readmission Evaluation  Is this a readmission?: No    Elopement Risk  Legal Hold: No  Ambulatory or Self Mobile in Wheelchair: Yes  Disoriented: No  Psychiatric Symptoms: None  History of Wandering: No  Elopement this Admit: No  Vocalizing Wanting to Leave: No  Displays Behaviors, Body Language Wanting to Leave: No-Not at Risk for Elopement  Elopement Risk: Not at Risk for Elopement    Interdisciplinary Discharge Planning  Does Admitting Nurse Feel This Could be a Complex Discharge?: No  Primary Care Physician: Dr Madelin Atwood  Lives with - Patient's Self Care Capacity: Spouse  Patient or legal guardian wants to designate a caregiver (see row info): No  Support Systems: Spouse / Significant Other  Do You Take your Prescribed Medications Regularly: Yes  Able to Return to Previous ADL's:  (Unable to assess at this time.)  Mobility Issues: No  Patient Expects to be Discharged to::  (Home)  Assistance Needed: Yes    Discharge Preparedness  What are your discharge supports?: Spouse  Prior Functional Level: Independent with Activities of Daily Living ( shops and cooks)  Difficulity with ADLs: None  Difficulity with IADLs: Cooking, Driving, Laundry, Shopping    Functional Assesment  Prior Functional Level: Independent with Activities of Daily Living ( shops and cooks)    Finances  Prescription Coverage:  No  Prescription Coverage Comments:  (Has been filling prescriptions at [facility])    Vision / Hearing Impairment  Vision Impairment : Yes (reading only )  Right Eye Vision: Wears Glasses  Left Eye Vision: Wears Glasses  Hearing Impairment : No    Values / Beliefs / Concerns  Values / Beliefs Concerns : No         Domestic Abuse  Have you ever been the victim of abuse or violence?: No  Physical Abuse or Sexual Abuse: No  Verbal Abuse or Emotional Abuse: No  Possible Abuse Reported to:: Not Applicable         Discharge Risks or Barriers  Discharge risks or barriers?: No    Anticipated Discharge Information  Anticipated discharge disposition: Home  Discharge Contact Phone Number: [phone]

## 2018-06-22 NOTE — DISCHARGE SUMMARY
DATE OF ADMISSION:  06/20/2018    DATE OF DISCHARGE:  06/22/2018    ADMITTING DIAGNOSES:  1.  Acute renal insufficiency.  2.  Right ureteral stent obstruction.  3.  Hypertension.  4.  History of renal insufficiency.  5.  History of recurrent ovarian cancer, end-stage.    DISCHARGE DIAGNOSES:  1.  Acute renal insufficiency.  2.  Intravesical ovarian cancer.  3.  Renal insufficiency, resolving.    PROCEDURES PERFORMED:  Cystoscopy with exchange of a right ureteral stent   7x22.    IN-HOUSE CONSULTATION:  Dr. Osmin Hillman for evaluation of intravesical tumor   found at the time of the cystoscopy.    DISCHARGE LABORATORY:  On 06/22/2018, chemistry panel is unremarkable.    Creatinine of 2.70 with potassium 5.5.    HOSPITAL COURSE:  The patient is a pleasant 77-year-old female who is well   known to me.  Patient has history of end-stage ovarian cancer.  She also has a   history of a right ureteral malignant obstruction.  Patient has had a stent   placed to alleviate this obstruction.  Patient was in her usual state of   health until recently she developed some urinary symptoms.  She was seen and   evaluated by her primary care doctor.  She was thought to have urinary tract   infection.  She underwent a blood test and was found to have elevated   creatinine.  She was advised to go to the ER for further evaluation.  She was   seen at [facility], she was noted to have a creatinine of 3.1 with potassium   5.3.  Because of me taking care of her for ovarian cancer, I was contacted by   [facility].  Her care was subsequently transferred to [facility] on 06/20/2018.  Patient was seen by me for consultation.  She was   making good urine.  She had an acute renal insufficiency.  It was thought that   her right ureteral stent was clot.  Her last ureteral stent change was back   in 03/2018.  She was taken back to surgery on 06/21/2018 and underwent a right   ureteral stent change.  Her creatinine improved.  During  her hospitalization,   her blood pressure was elevated and was restarted back on her carvedilol,   blood pressure improved.  At the time of the cystoscopy, she was found to have   tumor growth, which is new.  In addition, the CT scan that was performed   showed progression of disease.  I had a long discussion with the patient along   with her  regarding her progression of disease.  She is fully aware   that she has progression of disease.  In fact she had informed me that she has   had arrange of palliative service [facility].  We discussed about treatment   options.  I consulted Dr. Hillman mainly the possibility of a TURP procedure   as a palliative procedure has had concern that her intravesical tumor may   bleed or obstruct.  She will be following up with Dr. Hillman.  The patient   continued to do well.  On hospital day #2, her creatinine got down to 2.7.  We   will continue to monitor this.  She was well enough that she was discharged   home.    DISCHARGE INSTRUCTIONS:  1.  Patient is to follow up with Dr. Hillman as scheduled.  2.  Patient will follow up with me as scheduled.  3.  Patient will have a repeat BNP and will forward the results of next week.    DIET:  Renal.    ACTIVITY:  As tolerated.    DISCHARGE MEDICATIONS:  No new medication was prescribed.  Patient was advised   to resume all her home medications.       ____________________________________     JOMAR MALLOY MD    PCL / NTS    DD:  06/22/2018 14:25:38  DT:  06/22/2018 15:09:07    D#:  [id number]  Job#:  [id number]    cc: JENNIFER SPEARS MD, JEFF HILLMAN MD

## 2018-06-22 NOTE — OR SURGEON
Immediate Post OP Note    PreOp Diagnosis: right ureteral stent obstruction    PostOp Diagnosis:same, intravesicel tumor     Procedure(s):  CYSTOSCOPY STENT PLACEMENT-EXCHANGE - Wound Class: Clean Contaminated    Surgeon(s):  Mil Delcid M.D.    Anesthesiologist/Type of Anesthesia:  Anesthesiologist: Niraj Bhakta III, M.D./General    Surgical Staff:  Circulator: Lisbet Valdivia R.N.  Scrub Person: Vesna Morales    Specimens removed if any: none    Estimated Blood Loss:minimal    Findings: tumor growing base of the bladder    Complications: none        6/21/2018 7:32 PM Mil Delcid M.D.

## 2018-06-22 NOTE — PROGRESS NOTES
Pt c/o increasing pain. Pt ambulate to restroom with standby assist. Pt states she urinated and now has some pain relief. Pt given frozen meal and water. Pt call light and personal belongings within reach. Pt  Axel at bedside.

## 2018-06-22 NOTE — OP REPORT
DATE OF SERVICE:  06/21/2018    PREOPERATIVE DIAGNOSES:  1.  Right ureteral stent obstruction.  2.  Recurrent ovarian cancer.    POSTOPERATIVE DIAGNOSES:  1.  Right ureteral stent obstruction, recurrent ovarian cancer.  2.  Intravesical recurrent ovarian cancer.    PROCEDURE PERFORMED:  Cystoscopy with removal of 6x22 double-J ureteral stent   and placement of a 7x22 double-J ureteral stent.    SURGEON:  Mil Delcid MD.    ASSISTANT:  None.    ANESTHESIA:  General.    ANESTHESIOLOGIST:  Cristobal Bhakta MD.    ESTIMATED BLOOD LOSS:  Minimal.    FLUIDS:  Per Dr. Bhakta.    URINE OUTPUT:  Incomplete measure secondary to the procedure.    INDICATIONS FOR SURGERY:  The patient is a 77-year-old white female who is   well known to me.  Patient was initially diagnosed with ovarian cancer back   2012.  Unfortunately, patient has had multiple treatments including surgery   followed by chemotherapy.  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  The patient has had a   urethral stent change.  Her last ureteral stent change was in March of 2018.    She is due for stent change in July 2018.  However, patient presented to   primary care in the last few days with complaints of urinary symptoms.  It was   thought to be urinary tract infection.  Upon further workup, she was found to   have a significantly elevated creatinine.  She was subsequently sent to the   ER and her care was subsequently transferred to me mainly because it was   thought that her acute rise in creatinine was secondary to her right ureteral   stent blockage.  Patient was admitted overnight and is now brought back to the   operating room today to undergo removal of ureteral stents.    INTRAOPERATIVE FINDINGS:  Cystoscopic evaluation revealed an old stent.  There   are no calcifications noted.  We were unable to pass the guidewire into the   ureteral stent thus confirming that the ureteral stent was  blocked.  What was   also noted was at the base of the bladder, there is a tumor growth.  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  The intravesical   tumor that was noted though was not actively bleeding.    PROCEDURE NOTE:  Patient was given IV antibiotics prior to procedure.  Patient   was prepped and draped and placed in modified dorsal lithotomy position.  We   initially performed a cystoscopy.  I went ahead and retrieved the right   ureteral stent and the stent was brought out through the urethra.  Under   direct fluoroscopy, I then tried to pass a guidewire with the stent in   position in the ureter.  Under fluoroscopy, we were able to pass a guidewire   through the ureteral stent confirming that the ureteral stent was probably   blocked.  Thus, I then subsequently removed the entire ureteral stent.  I then   used a flexible tip guidewire and passed the right ureteral orifice into the   right renal pelvis under direct fluoroscopy.  This appears to be in good   position.  Once the flexible guidewire was placed while the ureter stent was   in there thus confirming our correct position; after this, a flexible   guidewire was placed and confirmed to be in good position.  The ureteral stent   was then subsequently removed.  I then passed a 7x22 double-J ureteral stent   easily without any resistance.  The flexible guidewire was then removed after   confirming that the right ureteral stent was in good position.  After   completion of this, the bladder was emptied.  Patient tolerated the procedure   well.  I did not do anything with the intravesical tumor that is noted.  I   will be consulting Dr. Hillman to determine if she should have a palliative   TURP procedure.    Patient tolerated the procedure well without any difficulties and was   subsequently transferred to the PACU in stable condition, extubated.       ____________________________________     JOMAR MALLOY MD    PCL /  NATHAN    DD:  06/21/2018 20:36:35  DT:  06/21/2018 22:24:23    D#:  [id number]  Job#:  [id number]

## 2018-06-22 NOTE — DISCHARGE INSTRUCTIONS
Discharge Instructions    Discharged to home by car with relative. Discharged via walking, hospital escort: Yes.  Special equipment needed: Not Applicable    Be sure to schedule a follow-up appointment with your primary care doctor or any specialists as instructed.     Discharge Plan:   Diet Plan: Discussed  Activity Level: Discussed  Confirmed Follow up Appointment: Appointment Scheduled  Confirmed Symptoms Management: Discussed  Medication Reconciliation Updated: Yes  Influenza Vaccine Indication: Patient Refuses    I understand that a diet low in cholesterol, fat, and sodium is recommended for good health. Unless I have been given specific instructions below for another diet, I accept this instruction as my diet prescription.   Other diet: LOW FAT    Special Instructions: None    · Is patient discharged on Warfarin / Coumadin?   No     Followup with blood test BMP in 1 week             Followup with Dr. Hillman             Followup with Dr. Delcid as scheduled.    Depression / Suicide Risk    As you are discharged from this [facility] facility, it is important to learn how to keep safe from harming yourself.    Recognize the warning signs:  · Abrupt changes in personality, positive or negative- including increase in energy   · Giving away possessions  · Change in eating patterns- significant weight changes-  positive or negative  · Change in sleeping patterns- unable to sleep or sleeping all the time   · Unwillingness or inability to communicate  · Depression  · Unusual sadness, discouragement and loneliness  · Talk of wanting to die  · Neglect of personal appearance   · Rebelliousness- reckless behavior  · Withdrawal from people/activities they love  · Confusion- inability to concentrate     If you or a loved one observes any of these behaviors or has concerns about self-harm, here's what you can do:  · Talk about it- your feelings and reasons for harming yourself  · Remove any means that you might use to hurt  yourself (examples: pills, rope, extension cords, firearm)  · Get professional help from the community (Mental Health, Substance Abuse, psychological counseling)  · Do not be alone:Call your Safe Contact- someone whom you trust who will be there for you.  · Call your local CRISIS HOTLINE 782-7039 or 952-843-9552  · Call your local Children's Mobile Crisis Response Team [place] (810) 952-8127 or www.Folloze  · Call the toll free National Suicide Prevention Hotlines   · National Suicide Prevention Lifeline 313-440-RNSZ (7477)  · National Hope Line Network 800-SUICIDE (855-8285)

## 2018-06-22 NOTE — PROGRESS NOTES
Met with  and pt for about 30  Minutes this evening. Discussed my intraop findings. Unfortunately she has progression of disease. The implications is she has terminal illness and prognosis is poor. We discussed about treatment options. In my opinion, further salvage chemotherapy will give her more toxicity then benefit. I am concern about the intravesical tumor that was found today. I am concern that this tumor will bleed and she will result in poor QOL. I have recommended she be evaluated by Dr. Hillman for palliative TURP procedure. She is amenable to the consult. She has also indicated to me that she has established with palliative care at [facility].     We will keep her overnite and monitor her crt.   I will consult Dr. Hillman in am.

## 2018-06-25 NOTE — TELEPHONE ENCOUNTER
----- Message from Natali Smith sent at 6/25/2018  9:15 AM PDT -----  Regarding: issues with blood pressure  IA/Kate      Patient says she's having issues with her blood pressure, and would like to discuss it with you. She can be reached at [phone].    ======================================================    Called pt, pt reports she is just recently admitted in the hospital for Acute Renal Insuff and Renal Obstruction, pt reports during the hospital stay her BP spikes up to SBP 200s, she was told by Dr Delcid that the meds given to her in the hospital could be contributing to that as well as the renal obs., she was not checking her BP prior to this hospital stay so she is unsure but we just recently changed her meds too and was just recently switched to Carvedilol 25mg PO BID, she has an upcoming appt w/ Dr Lua on 7/11/18, advise pt to monitor BP at this time, and bring that BP reading during her appt w/ Dr Lua, informed pt that if her BP consistently >180 before then to call our office back, pt agreed and verbalizes understanding, she also mentioned that she thinks that NS is made her BP up during hospital stay.     DONATOI to Dr Lua

## 2018-07-03 NOTE — TELEPHONE ENCOUNTER
Discussed w/ Dr Lua, Dr Lua agreed w/ above plan, continue monitoring BP, please have pt bring BP log and her BP machine with her on her upcoming FV.     Called pt and notified, pt verbalizes understanding

## 2018-07-11 NOTE — LETTER
Name:          Rosanna Damico   YOB: 1940  Date:     07/11/2018      Madelin Atwood M.D.  [address] [facility] [address]     Renny Lua MD  [address] , [facility] [address]  [place] [address]  Phone: [phone]  Back Line: [phone]  Fax: [phone]  E-mail: [email or web address]   Dear Dr. Atwood,    We had the pleasure of seeing your patient, Rosanna Damico, in Cardiology Clinic at [facility] today.    As you know, she is a 77-year-old woman with a history of ovarian cancer and labile hypertension with former PRES syndrome as well as left ventricular hypertrophy.    She continues to have very labile blood pressure, which appears to be adequately controlled on carvedilol though again extremely variable especially with renal injury and volume expansion.  I have not changed her medical regimen, and instructed her to continue to use clonidine as needed for spikes in blood pressure.  I will continue to monitor her left ventricular hypertrophy with serial echocardiograms in the future.    I did instruct her to be diligent in her bowel regimen with her hyperkalemia.  She is not fluid overloaded on physical examination despite increased fluid intake.  I reviewed again with her potassium containing foods to avoid in the setting of her recent acute kidney injury from ureteral stent closure.    Return in about 6 months (around 1/11/2019) for 3 IBA MDmonths with NP, 6 months with me.    Thank you for the referral and please do not hesitate to contact me at any time. My contact information is listed above.    This note was dictated using Dragon speech recognition software.     A full note including my physical examination and a full list of rectified medications is available in our medical record, and can be faxed as well.    Renny Lua MD  Cardiologist  [facility]    cc: Mil Delcid MD

## 2018-07-11 NOTE — PROGRESS NOTES
Subjective:   Rosanna Damico is a 77 -year-old woman with a history of ovarian cancer and labile hypertension with former PRES syndrome as well as left ventricular hypertrophy.     She tells me today about quite a bit of interval developments in her health.  She about 10 days ago had acute kidney injury related to which she was admitted to [facility].  There they prescribed her normal saline IV, and did a renal ultrasound documenting a ureteral stent occlusion.  She was transferred to at our [facility] the service of her gynecologic oncologist where urology replaced her ureteral stent, and found a bladder tumor apparently on cystoscopy.  Her renal function improved moderately, but has been variable since then, and she continues to have hyperkalemia related to which she has been advised to avoid potassium containing foods and supplement her fluid intake.  She does also have a bit of constipation related to her narcotic use.    She reminds me that she previously had swelling with amlodipine.    She comes in to review the results of her echocardiogram that we had previously called her about.    She brings in a log of her blood pressures from home documenting systolic blood pressures widely variable between 103 and 159 mmHg systolic.  Heart rates are in the high 50s and low 60s beats per minute range.    Past Medical History:   Diagnosis Date   • Cancer (HCC) 1/1/2011    ovarian, currently, last chemo 2/2018   • Hand fracture, right 2009   • Heart murmur     cardiologist Stoney sees every 6 months   • Hiatus hernia syndrome     unaware she has, showed up on CT scan   • Hypertension    • OSTEOPOROSIS    • Other specified disorder of intestines     due chemo   • Other specified symptom associated with female genital organs     ovarian cancer   • PRES (posterior reversible encephalopathy syndrome) 11/2013    caused by cisplatin, one seizure, no current issues   • Recurrent UTI 10/12/2017    none  since 10/17   • Renal disorder     hydronephrosis current 2016, elevated creatinine and BUN with Cisplatin 2012   • Seizure (HCC) 11/2013    due to Posterior Reversable Encephalopathy syndrome, from cisplatin, one time   • Unspecified disorder of thyroid     hypothyroid     Past Surgical History:   Procedure Laterality Date   • CYSTOSCOPY STENT PLACEMENT Right 6/21/2018    Procedure: CYSTOSCOPY STENT PLACEMENT-EXCHANGE;  Surgeon: Mil Delcid M.D.;  Location: SURGERY [facility];  Service: Gynecology   • CYSTOSCOPY STENT PLACEMENT Right 3/8/2018    Procedure: CYSTOSCOPY STENT PLACEMENT/ FOR URETERAL STENT EXCHANGE;  Surgeon: Mil Delcid M.D.;  Location: SURGERY [facility];  Service: Gynecology   • CYSTOSCOPY STENT PLACEMENT Right 4/20/2017    Procedure: CYSTOSCOPY STENT PLACEMENT FOR URETERAL STENT CHANGE;  Surgeon: Mil Delcid M.D.;  Location: [facility];  Service:    • CYSTOSCOPY STENT PLACEMENT Right 10/6/2016    Procedure: CYSTOSCOPY STENT PLACEMENT;  Surgeon: Mil Delcid M.D.;  Location: [facility];  Service:    • CYSTOSCOPY STENT PLACEMENT Right 2/5/2016    Procedure: CYSTOSCOPY STENT PLACEMENT;  Surgeon: Mil Delcid M.D.;  Location: SURGERY [facility];  Service:    • RETROGRADES  2/5/2016    Procedure: RETROGRADES;  Surgeon: Mil Delcid M.D.;  Location: [facility];  Service:    • RECOVERY  7/1/2015    Procedure: CT-CT Guided retroperitoneal lymph node biopsy-;  Surgeon: [facility];  Location: SURGERY PRE-POST PROC UNIT [facility];  Service:    • PELVISCOPY ROBOTIC  1/9/2014    Performed by Mil Delcid M.D. at [facility]   • DEBULKING  1/9/2014    Performed by Mil Delcid M.D. at [facility]   • CATH REMOVAL  1/9/2014    Performed by Mil Delcid M.D. at [facility]   • CATH PLACEMENT  11/8/2012    Performed by Mil Delcid M.D. at [facility]   • GYN SURGERY  9/2012    major pelvic surgery   •  "OTHER ABDOMINAL SURGERY  9/2012    colon resection, omentectomy, lymph node dissection, IP port    • OTHER  2/07/2011    hysterectomy   • ABDOMINAL HYSTERECTOMY TOTAL  02/2011   • OTHER ORTHOPEDIC SURGERY  2010    hand fracture   • OTHER  1987    thyroid lobectomy   • ABDOMINAL EXPLORATION       Family History   Problem Relation Age of Onset   • Heart Disease Mother    • Cancer Father      Rectal and Lung   • Lung Disease Father    • Heart Disease Brother    • Psychiatry Brother      Schizophrenic   • Cancer Paternal Aunt      History   Smoking Status   • Never Smoker   Smokeless Tobacco   • Never Used     Allergies   Allergen Reactions   • Cisplatin Unspecified     \"sever kidney reaction when received IP chemo\"  YTV=1572. Also had seizure   • Codeine Itching and Vomiting     RXN=>10 years ago     Outpatient Encounter Prescriptions as of 7/11/2018   Medication Sig Dispense Refill   • cloNIDine (CATAPRES) 0.1 MG Tab Take 1 Tab by mouth 2 times a day as needed (SBP > 160 mmHg). 30 Tab 6   • oxyCODONE-acetaminophen (PERCOCET) 5-325 MG Tab Take 1-2 Tabs by mouth every four hours as needed.     • therapeutic multivitamin-minerals (THERAGRAN-M) Tab Take 1 Tab by mouth every bedtime.     • B Complex Vitamins (VITAMIN B COMPLEX PO) Take  by mouth.     • CALCIUM/MAGNESIUM/ZINC FORMULA PO Take  by mouth.     • carvedilol (COREG) 25 MG Tab Take 1 Tab by mouth 2 times a day, with meals. 180 Tab 2   • levothyroxine (SYNTHROID) 75 MCG Tab Take  mcg by mouth Every morning on an empty stomach. 150 mcg on Sundays  75 mcg all other days     • Magnesium 500 MG TABS Take 1 Tab by mouth 2 Times a Day.       No facility-administered encounter medications on file as of 7/11/2018.      Review of Systems   Cardiovascular: Negative.         No palpitations since our last visit   All other systems reviewed and are negative.       Objective:   There were no vitals taken for this visit.    Physical Exam   Constitutional: She is oriented " to person, place, and time. She appears well-developed and well-nourished. No distress.   Pleasant, elderly woman in no distress   HENT:   Head: Normocephalic and atraumatic.   Eyes: Conjunctivae and EOM are normal. Pupils are equal, round, and reactive to light. No scleral icterus.   Neck: Neck supple. No JVD present. No tracheal deviation present.   Cardiovascular: Normal rate, regular rhythm, normal heart sounds and intact distal pulses.  Exam reveals no gallop and no friction rub.    No murmur heard.  Pulses:       Dorsalis pedis pulses are 2+ on the right side, and 2+ on the left side.   No carotid bruits   Pulmonary/Chest: Effort normal and breath sounds normal. No stridor. No respiratory distress. She has no wheezes. She has no rales.   Abdominal: Soft. Bowel sounds are normal. She exhibits no distension.   Musculoskeletal: She exhibits no edema (No significant lower extremity edema bilaterally).   Neurological: She is alert and oriented to person, place, and time.   Skin: Skin is warm and dry. No rash noted. She is not diaphoretic. No erythema. No pallor.   Psychiatric: She has a normal mood and affect. Judgment and thought content normal.   Vitals reviewed.    Lab Results   Component Value Date/Time    WBC 4.6 (L) 06/21/2018 04:32 AM    RBC 3.06 (L) 06/21/2018 04:32 AM    HEMOGLOBIN 8.1 (L) 06/21/2018 04:32 AM    HEMATOCRIT 26.3 (L) 06/21/2018 04:32 AM    MCV 85.9 06/21/2018 04:32 AM    MCH 26.5 (L) 06/21/2018 04:32 AM    MCHC 30.8 (L) 06/21/2018 04:32 AM    MPV 8.6 (L) 06/21/2018 04:32 AM        Lab Results   Component Value Date/Time    SODIUM 136 07/09/2018 01:35 PM    POTASSIUM 5.9 (H) 07/09/2018 01:35 PM    CHLORIDE 105 07/09/2018 01:35 PM    CO2 22 07/09/2018 01:35 PM    GLUCOSE 83 07/09/2018 01:35 PM    BUN 50 (H) 07/09/2018 01:35 PM    CREATININE 2.2 (H) 07/09/2018 01:35 PM        Lab Results   Component Value Date/Time    ASTSGOT 22 07/09/2018 01:35 PM    ALTSGPT 16 07/09/2018 01:35 PM        Lab  "Results   Component Value Date/Time    CHOLRLTOT 190 07/01/2016 09:30 AM     (H) 07/01/2016 09:30 AM    HDL 58 07/01/2016 09:30 AM    TRIGLYCERIDE 120 07/01/2016 09:30 AM       Echocardiogram, 6/13/2016:  \"CONCLUSIONS  Normal left ventricular systolic function, EF 65%.   Mild concentric left ventricular hypertrophy.   Trace to mild mitral regurgitation.  Mild aortic insufficiency.   Trace to mild tricuspid regurgitation.   Estimated right ventricular systolic pressure  is 33 mmHg.  No prior study is available for comparison\"    Echocardiogram, 5/23/2018: EF reported at 68%, mild to moderate concentric left ventricular hypertrophy, RVSP 45 mmHg, left atrial volume index 39 mL/meter squared, small posterior pericardial effusion    Assessment:     1. Renovascular hypertension  cloNIDine (CATAPRES) 0.1 MG Tab   2. LVH (left ventricular hypertrophy)     3. Acute kidney injury (HCC)     4. Hyperkalemia         Medical Decision Making:  Today's Assessment / Status / Plan:     She continues to have very labile blood pressure, which appears to be adequately controlled on carvedilol though again extremely variable especially with renal injury and volume expansion.  I have not changed her medical regimen, and instructed her to continue to use clonidine as needed for spikes in blood pressure.  I will continue to monitor her left ventricular hypertrophy with serial echocardiograms in the future.    I did instruct her to be diligent in her bowel regimen with her hyperkalemia.  She is not fluid overloaded on physical examination despite increased fluid intake.  I reviewed again with her potassium containing foods to avoid.    Renny Lua MD  Cardiologist, [facility] [place]     No Follow-up on file.    Physical Exam   Constitutional: She is oriented to person, place, and time. She appears well-developed and well-nourished. No distress.   Pleasant, elderly woman in no distress   HENT:   Head: " Normocephalic and atraumatic.   Eyes: Conjunctivae and EOM are normal. Pupils are equal, round, and reactive to light. No scleral icterus.   Neck: Neck supple. No JVD present. No tracheal deviation present.   Cardiovascular: Normal rate, regular rhythm, normal heart sounds and intact distal pulses.  Exam reveals no gallop and no friction rub.    No murmur heard.  Pulses:       Dorsalis pedis pulses are 2+ on the right side, and 2+ on the left side.   No carotid bruits   Pulmonary/Chest: Effort normal and breath sounds normal. No stridor. No respiratory distress. She has no wheezes. She has no rales.   Abdominal: Soft. Bowel sounds are normal. She exhibits no distension.   Musculoskeletal: She exhibits no edema (No significant lower extremity edema bilaterally).   Neurological: She is alert and oriented to person, place, and time.   Skin: Skin is warm and dry. No rash noted. She is not diaphoretic. No erythema. No pallor.   Psychiatric: She has a normal mood and affect. Judgment and thought content normal.   Vitals reviewed.

## 2018-09-06 PROBLEM — K56.609 BOWEL OBSTRUCTION (HCC): Status: ACTIVE | Noted: 2018-01-01

## 2018-09-08 PROBLEM — I48.92 ATRIAL FLUTTER (HCC): Status: ACTIVE | Noted: 2018-01-01

## 2018-09-09 PROBLEM — N30.01 ACUTE CYSTITIS WITH HEMATURIA: Status: ACTIVE | Noted: 2018-01-01

## 2018-10-11 PROBLEM — G89.3 CANCER RELATED PAIN: Status: ACTIVE | Noted: 2018-01-01

## 2024-04-13 NOTE — PROGRESS NOTES
Chair visit with Rosanna today while in infusion.  States she if feeling frustrated that her chemo has been delayed due to abnormal lab values.  Her chemo is now scheduled for Saturday.  States she does not want to make multiple trips to [place] to see her doctor on one day and then return on another day for chemotherapy.  Discussed situation with Dr. Villa.  Pt to get cycle 4, day 1 on Saturday, November 18 and day 8 on Saturday, November 25.  Pt to have labs done at [place] and possible hydration as well between cycles.  Pt has lab slips.  Cycle 5 day 1 will be on December 13 and day 8 on December 20. Pt will see Dr. Delcid on December 13 at 0830.  Dr. Villa and pt agree to this plan.     7

## 2025-07-04 NOTE — PROGRESS NOTES
"Pharmacy chemotherapy verification:    Patient Name: Rosanna Damico  Dx: recurrent ovarian cancer          Protocol: carboplatin/gemcitabine + bevacizumab     *Dosing Reference*  Carboplatin (Paraplatin) AUC 4 IV once on day 1  Gemcitabine (Gemzar) 1000 mg/m2 IV once per day on days 1 & 8  Bevacizumab (Avastin) 15 mg/kg IV once on day 1, given first  21-day cycle for 6 to 10 cycles, based on response; after completing these cycles of carboplatin, gemcitabine, and bevacizumab, patients continue on bevacizumab maintenance until progression of disease or unacceptable toxicity   Rayne SANTIAGO et al- [facility]-  J Clin Oncol. 2012 Clif 10; 30(17): 8144-6816.     Allergies:  Cisplatin and Codeine     /42   Pulse (!) 51   Temp 36.2 °C (97.1 °F)   Resp 18   Ht 1.626 m (5' 4.02\")   Wt 64.3 kg (141 lb 12.1 oz)   SpO2 100%   BMI 24.32 kg/m²  Body surface area is 1.7 meters squared.    10/3/2017:  ANC: 1968      WBC: 4.6     Plt: 415k   Hgb/Hct: 9.9/31.2     SCr: 1.31mg/dL (OK to give per MD) CrCl: 38mL/min     Na: 132          Glu: 118    LFT's: 37/43/74 TBili = 0.3     Total protein, urine: 30.7  Protein creatinine:45.8     UPCR: 0.670 g/g    9/6/17 Carboplatin skin test: no reaction     Drug Order   (Drug name, dose, route, IV Fluid & volume, frequency, number of doses) Cycle: C2D1  Previous treatment: 9/20/2017  carboplatin skin test 9/6; 6 cycles Taxol, last 4/4/17     Medication = Gemcitabine  Base Dose= 1000 mg/m2   Calc Dose: Base Dose x 1.7 m2 = 1700 mg  Final Dose = 1710 mg  Route = IV  Fluid & Volume =  mL  Admin Duration = Over 30min          <5% difference, ok to treat with final dose     Medication = Carboplatin  Base Dose= AUC 4  Calc Dose: Base Dose x (38 + 25) = 252 mg  Final Dose = 245 mg  Route = IV  Fluid & Volume = NS 250mL  Admin Duration = Over 30 min          <5% difference, ok to treat with final dose     Medication = Bevacizumab  Base Dose= 15mg/kg  Calc Dose:Base Dose x 64.3 kg = " 964.5 mg  Final Dose = 1000 mg  Route = IV  Fluid & Volume =  mL  Admin Duration = Over 90 min          <5% difference, rounded to nearest vial size per protocol, ok to treat with final dose           By my signature below, I confirm this process was performed independently with the BSA and all final chemotherapy dosing calculations congruent. I have reviewed the above chemotherapy order and that my calculation of the final dose and BSA (when applicable) corroborate those calculations of the  pharmacist. Discrepancies of 5% or greater in the written dose have been addressed and documented within the EPIC Progress notes.    JESUS Palencia, PharmD             none unknown

## (undated) DEVICE — NEPTUNE 4 PORT MANIFOLD - (20/PK)

## (undated) DEVICE — SENSOR SPO2 NEO LNCS ADHESIVE (20/BX) SEE USER NOTES

## (undated) DEVICE — LACTATED RINGERS INJ 1000 ML - (14EA/CA 60CA/PF)

## (undated) DEVICE — GOWN WARMING STANDARD FLEX - (30/CA)

## (undated) DEVICE — ELECTRODE 850 FOAM ADHESIVE - HYDROGEL RADIOTRNSPRNT (50/PK)

## (undated) DEVICE — GOWN SURGEONS X-LARGE - DISP. (30/CA)

## (undated) DEVICE — WATER IRRIGATION STERILE 1000ML (12EA/CA)

## (undated) DEVICE — SET IRRIGATION CYSTOSCOPY TUBE L80 IN (20EA/CA)

## (undated) DEVICE — HEAD HOLDER JUNIOR/ADULT

## (undated) DEVICE — SET LEADWIRE 5 LEAD BEDSIDE DISPOSABLE ECG (1SET OF 5/EA)

## (undated) DEVICE — GOWN SURGICAL X-LARGE ULTRA - FILM-REINFORCED (20/CA)

## (undated) DEVICE — CANISTER SUCTION 3000ML MECHANICAL FILTER AUTO SHUTOFF MEDI-VAC NONSTERILE LF DISP  (40EA/CA)

## (undated) DEVICE — JELLY, KY 2 0Z STERILE

## (undated) DEVICE — DRESSING TRANSPARENT FILM TEGADERM 2.375 X 2.75"  (100EA/BX)"

## (undated) DEVICE — SPONGE GAUZESTER 4 X 4 4PLY - (128PK/CA)

## (undated) DEVICE — WATER IRRIG. STER. 1500 ML - (9/CA)

## (undated) DEVICE — MASK ANESTHESIA ADULT  - (100/CA)

## (undated) DEVICE — KIT ROOM DECONTAMINATION

## (undated) DEVICE — TUBING CLEARLINK DUO-VENT - C-FLO (48EA/CA)

## (undated) DEVICE — PACK CYSTOSCOPY - (14/CA)

## (undated) DEVICE — LEAD SET 6 DISP. EKG NIHON KOHDEN

## (undated) DEVICE — CATHETER IV 18 GA X 1-3/4 ---SURG.& SDS ONLY---

## (undated) DEVICE — PAD OR TABLE DA VINCI 2IN X 20IN X 72IN - (12EA/CA)

## (undated) DEVICE — TUBE CONNECT SUCTION CLEAR 120 X 1/4" (50EA/CA)"

## (undated) DEVICE — TOWELS CLOTH SURGICAL - (4/PK 20PK/CA)

## (undated) DEVICE — SET EXTENSION WITH 2 PORTS (48EA/CA) ***PART #2C8610 IS A SUBSTITUTE*****

## (undated) DEVICE — PROTECTOR ULNA NERVE - (36PR/CA)

## (undated) DEVICE — WIRE GUIDE .038 X 150 FLEX - .

## (undated) DEVICE — SYRINGE DISP. 12 CC LL - (100/BX)

## (undated) DEVICE — KIT ANESTHESIA W/CIRCUIT & 3/LT BAG W/FILTER (20EA/CA)

## (undated) DEVICE — SLEEVE, VASO, THIGH, MED

## (undated) DEVICE — TUBE E-T HI-LO CUFF 7.0MM (10EA/PK)

## (undated) DEVICE — MASK AIRWAY SIZE 3 UNIQUE SILICON (10/BX)

## (undated) DEVICE — SUCTION INSTRUMENT YANKAUER BULBOUS TIP W/O VENT (50EA/CA)

## (undated) DEVICE — Device